# Patient Record
Sex: FEMALE | URBAN - METROPOLITAN AREA
[De-identification: names, ages, dates, MRNs, and addresses within clinical notes are randomized per-mention and may not be internally consistent; named-entity substitution may affect disease eponyms.]

---

## 2017-03-22 NOTE — PATIENT DISCUSSION
Surgery Counseling:  I have discussed the option of glasses versus cataract surgery versus following, It was explained that when vision no longer meets the patient's visual needs and a new prescription for glasses is not likely to improve the patient's visual symptoms, the option of cataract surgery is a reasonable next step. It was explained that there is no guarantee that removing the cataract will improve their visual symptoms; however, it is believed that the cataract is contributing to the patient's visual impairment and surgery may noticeably improve both the visual and functional status of the patient. After this discussion, the patient desires to proceed with cataract surgery with implantation of an intraocular lens to improve their vision for driving at night.

## 2017-03-22 NOTE — PATIENT DISCUSSION
CATARACT, OU - VISUALLY SIGNIFICANT. SCHEDULE PHACO WITH IOL OD FIRST THEN OS IF VISUAL SYMPTOMS PERSIST. GLASSES RX GIVEN TO FILL IF DESIRES IN THE EVENT PATIENT DOES NOT PROCEED WITH SURGERY. SURGERIES ONE MONTH APART SO THAT POST-OP/PRE-OP MAY BE DONE IN [facility] OFFICE.

## 2017-04-21 NOTE — PATIENT DISCUSSION
CATARACT, OU - VISUALLY SIGNIFICANT. SCHEDULE PHACO WITH IOL OS FIRST THEN OD IF VISUAL SYMPTOMS PERSIST.

## 2017-04-21 NOTE — PATIENT DISCUSSION
New Prescription: Besivance (besifloxacin): drops,suspension: 0.6% 1 drop three times a day as directed into left eye 04-

## 2017-04-21 NOTE — PATIENT DISCUSSION
New Prescription: Pred Forte (prednisolone acetate): drops,suspension: 1% 1 drop three times a day as directed into left eye 04-

## 2017-04-21 NOTE — PATIENT DISCUSSION
New Prescription: Prolensa (bromfenac): drops: 0.07% 1 drop every morning as directed into left eye 04-

## 2017-04-21 NOTE — PATIENT DISCUSSION
Continue: Opcon-A (naphazoline-pheniramine): drops: 0.01429-7.315% 1 drop every morning into both eyes

## 2017-04-21 NOTE — PATIENT DISCUSSION
Surgery Drop Counseling:  I have prescribed Besivance, Prolensa and Pred Forte for use as directed before and after cataract surgery.

## 2017-04-21 NOTE — PATIENT DISCUSSION
REFRACTIVE ERROR - PRESBYOPIA AND ASTIGMATISM:  PATIENT DESIRES TO HAVE THEIR REFRACTIVE ERROR SURGICALLY CORRECTED WITH A SYMFONY TORIC EXTENDED RANGE OF VISION IOL.

## 2017-04-26 NOTE — PATIENT DISCUSSION
Continue: Pred Forte (prednisolone acetate): drops,suspension: 1% 1 drop three times a day as directed into left eye 04-

## 2017-04-26 NOTE — PATIENT DISCUSSION
S/P PC IOL, OS: GOOD POST OP RESULT. STOP ANTIBIOTIC DROP IN ONE WEEK. CONTINUE OTHER DROPS AS DIRECTED UNTIL FURTHER INSTRUCTION. RETURN FOR FOLLOW-UP AS SCHEDULED.

## 2017-04-26 NOTE — PATIENT DISCUSSION
Continue: Opcon-A (naphazoline-pheniramine): drops: 0.63074-7.315% 1 drop every morning into both eyes

## 2017-04-26 NOTE — PATIENT DISCUSSION
Continue: Besivance (besifloxacin): drops,suspension: 0.6% 1 drop three times a day as directed into left eye 04-

## 2017-05-08 NOTE — PATIENT DISCUSSION
*Pre-Op 2nd Eye Counseling: The patient has noticed an improvement in their visual symptoms in the operative eye. The patient complains of decreased vision in the fellow eye when driving, watching Tv, and using a computer. It was explained to the patient that the decision to proceed with cataract surgery in the fellow eye is entirely a separate decision from the surgical eye. All of the same risks, benefits and alternatives ere reviewed with the patient again. The patient does feel the vision in the non-operative eye is limiting their daily activities and elects to proceed with surgery in the cataract eye.

## 2017-05-08 NOTE — PATIENT DISCUSSION
Continue: Besivance (besifloxacin): drops,suspension: 0.6% 1 drop three times a day as directed into right eye 04-

## 2017-05-08 NOTE — PATIENT DISCUSSION
*S/P PC IOL, OS : DOING WELL. CONTINUE TO TAPER DROPS AS DIRECTED. OK TO PROCEED WITH THE 2ND EYE CATARACT SURGERY AS SCHEDULED.

## 2017-05-08 NOTE — PATIENT DISCUSSION
REFRACTIVE ERROR - PRESBYOPIA:  PATIENT DESIRES TO HAVE THEIR REFRACTIVE ERROR SURGICALLY CORRECTED WITH A SYMFONY TORIC EXTENDED RANGE OF VISION IOL.

## 2017-05-08 NOTE — PATIENT DISCUSSION
Continue: Opcon-A (naphazoline-pheniramine): drops: 0.09896-0.315% 1 drop every morning into both eyes

## 2017-06-07 NOTE — PATIENT DISCUSSION
PCF OU; BECOMING VISUALLY SIGNIFICANT BUT NOT BOTHERSOME TO PATIENT AT THIS TIME. CONTINUE TO FOLLOW FOR NOW. OFFER SPEC RX UPDATE.

## 2017-06-07 NOTE — PATIENT DISCUSSION
Continue: Opcon-A (naphazoline-pheniramine): drops: 0.55530-1.315% 1 drop every morning into both eyes

## 2017-06-07 NOTE — PATIENT DISCUSSION
Post-Op Instructions OD: Pred Forte (Prednisolone) reduce to 1 time per day for 1 week, then discontinue. Prolensa (Bromfenac) 1 time per day for 1 week, then discontinue.

## 2017-06-07 NOTE — PATIENT DISCUSSION
Continue: Pred Forte (prednisolone acetate): drops,suspension: 1% 1 drop three times a day as directed into both eyes 04-

## 2018-03-01 ENCOUNTER — IMPORTED ENCOUNTER (OUTPATIENT)
Dept: URBAN - METROPOLITAN AREA CLINIC 1 | Facility: CLINIC | Age: 43
End: 2018-03-01

## 2018-03-01 PROBLEM — H16.002: Noted: 2018-03-01

## 2018-03-01 PROCEDURE — 92002 INTRM OPH EXAM NEW PATIENT: CPT

## 2018-03-01 NOTE — PATIENT DISCUSSION
1.  Corneal ulcer OS: Due to CTL overwear. Begin Ofloxacin Q2H OS x 4 days then decrease QID OS until seen (erx). Return immediately if ulcer larger or symptoms worsen. No contact lens use. 2.  Corneal Neovascularization OUReturn for an appointment in 1 week 10 with Dr. Nathaniel King.

## 2018-03-08 ENCOUNTER — IMPORTED ENCOUNTER (OUTPATIENT)
Dept: URBAN - METROPOLITAN AREA CLINIC 1 | Facility: CLINIC | Age: 43
End: 2018-03-08

## 2018-03-08 PROBLEM — H16.002: Noted: 2018-03-08

## 2018-03-08 PROBLEM — H16.012: Noted: 2018-03-08

## 2018-03-08 PROCEDURE — 99213 OFFICE O/P EST LOW 20 MIN: CPT

## 2018-03-08 NOTE — PATIENT DISCUSSION
1.  Corneal ulcer OS: Resolved. REC patient cont DC CLs for a few more day then start by wearing a fresh pair. Educated patient on wearing CLS as directed 2 weeks only. REC patient to not sleep in lens will consider dailies PRN. Cont Ofloxacin until through the weekend BID OS then patient can DC. 2. Return for an appointment in as needed with Dr. Kenisha Senior.

## 2018-08-06 ENCOUNTER — IMPORTED ENCOUNTER (OUTPATIENT)
Dept: URBAN - METROPOLITAN AREA CLINIC 1 | Facility: CLINIC | Age: 43
End: 2018-08-06

## 2018-08-06 PROBLEM — H00.11: Noted: 2018-08-06

## 2018-08-06 PROCEDURE — 92012 INTRM OPH EXAM EST PATIENT: CPT

## 2018-08-06 NOTE — PATIENT DISCUSSION
1.  Nasal RUL Chalazion -- Hot compresses TID x 5 minutes for 3 weeks. If without improvement discussed with patient possible Incision and Drainage procedure. Risks and benefits discussed with patient and patient states full understanding. Recommend the frequent use of OTC AT's BID-QID OU. Recommend the use of OTC Gel AT's Gtts QHS OU (sample given). Recommend d/c the use of CTL at this time for a week or two until Chalazion / symptoms resolve. 2.  Corneal Scar OS -- Subsequently from previous K Ulcer. 3. H/o Corneal Ulcer OS -- Resolved. Return for an appointment as needed or if no improvement / worsening in symptoms with Dr. Nathaniel King.

## 2019-09-03 ENCOUNTER — OFFICE VISIT (OUTPATIENT)
Dept: ORTHOPEDIC SURGERY | Age: 44
End: 2019-09-03

## 2019-09-03 VITALS
HEART RATE: 100 BPM | OXYGEN SATURATION: 99 % | DIASTOLIC BLOOD PRESSURE: 75 MMHG | BODY MASS INDEX: 26.4 KG/M2 | HEIGHT: 70 IN | SYSTOLIC BLOOD PRESSURE: 119 MMHG | WEIGHT: 184.4 LBS | RESPIRATION RATE: 17 BRPM | TEMPERATURE: 96.2 F

## 2019-09-03 DIAGNOSIS — M21.619 BUNION OF GREAT TOE: Primary | ICD-10-CM

## 2019-09-03 DIAGNOSIS — M21.611 BUNION, RIGHT: ICD-10-CM

## 2019-09-03 DIAGNOSIS — Z01.818 PRE-OP EXAM: ICD-10-CM

## 2019-09-03 RX ORDER — LORAZEPAM 2 MG/1
TABLET ORAL
COMMUNITY

## 2019-09-03 RX ORDER — LAMOTRIGINE 100 MG/1
200 TABLET ORAL 2 TIMES DAILY
COMMUNITY

## 2019-09-03 RX ORDER — BUPROPION HYDROCHLORIDE 150 MG/1
TABLET, EXTENDED RELEASE ORAL DAILY
COMMUNITY

## 2019-09-03 NOTE — PROGRESS NOTES
AMBULATORY PROGRESS NOTE      Patient: Mono Russo             MRN: [id number]     SSN: [id number] Body mass index is 26.46 kg/m². YOB: 1975     AGE: 45 y.o. SEX: female    PCP: No primary care provider on file. IMPRESSION/DIAGNOSIS AND TREATMENT PLAN     DIAGNOSES    1. Bunion of great toe    2. Bunion, right    3. Pre-op exam        Orders Placed This Encounter    CULTURE, URINE    [00017] Foot Min 3V    XR CHEST PA LAT    CBC WITH AUTOMATED DIFF    METABOLIC PANEL, COMPREHENSIVE    URINALYSIS W/ RFLX MICROSCOPIC    PROTHROMBIN TIME + INR    HCG URINE, QL    NICOTINE AND METABOLITE, QT SERUM, PLASMA, WHOLE BLOOD    EKG, 12 LEAD, INITIAL      Mono Russo understands her diagnoses and the proposed plan. I had a lengthy discussion with this patient, Ms. Mono Russo, is a 73-year-old female who has been having long-standing pain and discomfort to her right foot, for which she has a diagnosis of a bunion deformity. In examining her, she has some increased mobility at the first TMT joint articulation. Because of her continued pain and discomfort and despite shoe wear changes, the recommendation is for surgery. She does wear 2-inch stiletto heels. There is no guarantee made that she could fit in these stiletto heels at a specific date or time frame. She had her left bunion surgery done many years ago, but at that time, she had a sit-down job and did not have to wear stilettos. She did not have to wear high heeled shoes. 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 I had a lengthy discussion with her regarding smoking. She understands smoking is deleterious to bone healing.   In looking at her x-rays and with her examination, with her having increased mobility to her first metatarsal ray, I am recommending a Lapidus type procedure or Lapiplasty type procedure to gain control of the SKYLER angle and improve the SKYLER angle, and then, of course, do a modified Avina distal soft tissue procedure, possible Ismael procedure. She understands she will have at least two or three incisions on her foot. What dictates how soon she fits in a shoe would be the swelling that she has postoperatively. I anticipate her being nonweightbearing for at least the first two or three weeks to get the incision to heal nicely and then gradual weightbearing to her heel and then we will gradually increase her weightbearing thereafter, but she understands this is going to take at least 10-12 weeks to gain an arthrodesis across this TMT perceived joint arthrodesis. So, we will have her meet and talk to Matt Monique, my surgery scheduler, and we will get things scheduled for her at her convenience. Plan:    1) Pre-Op Labs: CBC w/ diff, CMP, PT INR, CXR, EKG, UA w/ reflex, Urine Culture, pregnancy test, nicotine level. 2) Contact Jillian for surgical scheduling. 3) Continue activity modification as directed. 4) Plan for bone stimulator after surgery. RTO - after contacting [address] [place] [address]     HPI AND EXAMINATION     Clay Dumont IS A 45 y.o. female who presents to my outpatient office complaining of right foot pain. Ms. Tank Ovalle reports that she has a bunion on her right great toe that is causing her pain and discomfort. She notes that her pain has been progressively worsening over the past few years. She states that she is at a point now where even flat shoes cause her discomfort. 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 She notes that she has occasional pain in her left great toe bunion, as well, but not as bad or as frequently as her right foot. She has had surgery on her left great toe bunion in the past. Possible surgical interventions have been discussed with the patient. She states that she would like to have surgery soon, so that she is able to be in a flat shoe by the first week of December.  She notes that she has a pair of stilettos that she is aiming to wear by December. The patient denies any kidney, lung, or heart diseases. She reports that she is a current smoker. I very strongly urged Rey Sorto to quit smoking to reduce cardiovascular risk and to promote bone healing. Rey Sorto understands the cardiovascular and orthopaedic risks of continued cigarette smoking. The patient smokes 3 cigarettes a day. The patient works in event management. Visit Vitals  /75 (BP 1 Location: Left arm, BP Patient Position: Sitting)   Pulse 100   Temp 96.2 °F (35.7 °C) (Oral)   Resp 17   Ht 5' 10\" (1.778 m)   Wt 184 lb 6.4 oz (83.6 kg)   SpO2 99%   BMI 26.46 kg/m²     Appearance: Alert, well appearing and pleasant patient who is in no distress, oriented to person, place/time, and who follows commands. Psychiatric: Affect and mood are appropriate. Cardiovascular/Peripheral Vascular: Normal Pulses to each hand and foot  Musculoskeletal:  LOCATION:  Right FOOT/ANKLE  Integumentary: No rashes, skin patches, wounds, or abrasions to the right or left legs       Warm and normal color. No regions of expressible drainage. Gait:  Slight Limp with gait          Alignment: moderate Bunion Alignment is present,with pronation, with adduction, with toe abutment. no Hammertoe(s) present           Tenderness: mild Medial Eminence of Bunion         Mild tenderness to the plantar great toe MTP   NONE to Medial Malleolar, 4/5 Met base midfoot, achilles, tib post, or   NONE to syndesmosis.        NT with distraction or compression of the 1st MTP     Motor/Strength/Tone Exam: Normal Dorsiflexion/Plantar Flexion of a great toe MTP     Sensory Exam: Intact Normal Sensation to ankle/foot      Stability Testing: SPRING back mobility test for 1st metatarsal is positive, mild   1st TMT instability not tested   No anterolateral or varus instability of the Ankle or Subtalar Joints   No peroneal tendon instability noted ROM: Normal ROM noted to ankle      Contractures: No Achilles or Gastrocnemius Contractures      Calf tenderness: Absent for calf or gastrocnemius muscle regions       Soft, supple, non tender, non taut lower extremity compartments  Alignment: Neutral Hindfoot  Wounds/Abrasions:   None present  Extremities:   No embolic phenomena to the toes or hands         No significant edema to the foot and or toes. Lower extremities are warm and appear well perfused    DVT: No evidence of DVT seen on examination at this time    No calf swelling, no tenderness to calf muscles  Lymphatic:  No Evidence of Lymphedema  Vascular: Medial Border of Tibia Region: Edema is not present        Pulses: Dorsalis Pedis &  Posterior Tibial Pulses : Palpable yes        Varicosities Lower Limbs :  None    Neuro: Negative bilateral Straight leg raise (seated position)    See Musculoskeletal section for pertinent individual extremity examination    No abnormal hand/wrist, foot/ankle, or facial/neck tremors. CHART REVIEW     Past Medical History:   Diagnosis Date    ADHD     Anxiety     Sleep disorder      Current Outpatient Medications   Medication Sig    buPROPion SR (WELLBUTRIN SR) 150 mg SR tablet Take  by mouth two (2) times a day.  Lisdexamfetamine (VYVANSE) 70 mg cap Take  by mouth daily.  lamoTRIgine (LAMICTAL) 100 mg tablet Take  by mouth daily.  lurasidone (LATUDA) 20 mg tab tablet Take  by mouth.  ibuprofen 100 mg tablet Take 100 mg by mouth every six (6) hours as needed for Pain.  LORazepam (ATIVAN) 2 mg tablet Take  by mouth every six (6) hours as needed for Anxiety.  aspirin-acetaminophen-caffeine (EXCEDRIN ES) 250-250-65 mg per tablet Take 1 Tab by mouth. No current facility-administered medications for this visit.       No Known Allergies  Past Surgical History:   Procedure Laterality Date    HX  SECTION      HX CHOLECYSTECTOMY       Social History     Occupational History    Not on file   Tobacco Use    Smoking status: Never Smoker    Smokeless tobacco: Never Used   Substance and Sexual Activity    Alcohol use: Never     Frequency: Never    Drug use: Never    Sexual activity: Not on file     Family History   Problem Relation Age of Onset    Hypertension Mother     Hypertension Father         REVIEW OF SYSTEMS : 9/3/2019  ALL BELOW ARE Negative except : SEE HPI     General: Negative for fever and chills. No unexpected change in weight. Denies fatigue. No change in appetite. Dermatologic: Negative for rash or itching, dry skin, hair changes, rash or skin lesion changes  HEENT: Negative for congestion, sore throat, neck pain and neck stiffness. No change in vision or hearing. Hasn't noted any enlarged lymph nodes in the neck. Cardiovascular:  Negative for chest pain and palpitations. Has not noted pedal edema. Peripheral Vascular: No calf pain, vascular vein tenderness to calf pain           No calf throbbing, posterior knee throbbing pain   Respiratory: Negative for cough, colds, sinus, hemoptysis, shortness of breath and wheezing. Gastrointestinal: Negative for nausea and vomiting, rectal bleeding, coffee ground emesis, abdominal pain, diarrhea and constipation. Genitourinary: Negative for dysuria, frequency urgency, or burning on micturition. No flank pain, no foul smelling urine, no difficulty with initiating urination. Hematological: Negative for bleeding or easy bruising. Musculoskeletal: Negative  for arthralgias, back pain or neck pain. Neurological: Negative for dizziness, seizures or syncopal episodes. Denies headaches. Endocrine: Denies excessive thirst.  No heat/cold intolerance. Psychiatric: Negative for depression or insomnia. DIAGNOSTIC IMAGING     FOOT X RAYS 3 VIEWS Right   9/3/2019    WEIGHT BEARING    X RAYS AT [facility] [address] [place] [address]  9/3/2019      Bones: No fractures or dislocations.  No focal osteolytic or osteoblastic process    Bone Spurs: No significant bone spurs   Alignment: Bunion Alignment: WNL    moderate Hallux Valgus Alignment  (15 degrees),    (8.5 degrees) increased IM ANGLE (1st/2nd)   Metatarsus Adductus is absent   Midfoot Alignment is WNL. Joint: Congruent  Soft Tissues: Soft Tissues No abnormal calcific densities to soft tissues     No ankle joint effusion in lateral projection. Mineralization: Suggests Normal Bone     There is a peaked, central portion of the right great toe first MTP at the metatarsal.  (The patient is completely nontender to distraction tests and grind tests in this region.)        I have personally reviewed the results of the above study. The interpretation of this study is my professional opinion    Jessica Dorman MD  9/3/2019  2:38 PM           .        Please see above section of this report. I have personally reviewed the results of the above study. The interpretation of this study is my professional opinion. Written by Vera Solomon, as dictated by Dr. Patrick Piña. I, Dr. Patrick Piña, confirm that all documentation is accurate.

## 2019-09-03 NOTE — PROGRESS NOTES
1. Have you been to the ER, urgent care clinic since your last visit? Hospitalized since your last visit? No    2. Have you seen or consulted any other health care providers outside of the [address] [place] [address] since your last visit? Include any pap smears or colon screening.  No

## 2019-09-03 NOTE — LETTER
[facility] Surgery Request Form for [facility] Fax to [phone]                                        Telephone: [phone] or [phone] To be completed by Physician Office: 
 
Date: 2019    Requested by: Javier Parsons Phone No: [phone]  Fax No:  [phone] Surgery Date: 19   Requested Time: Second Case Surgeon: Sun Holland MD  Assistant/2nd Surgeon: Katrin Dove PA-C 
 
CPT CODE Procedure 64033, 69312 Right Lapidus, modified Brigette Him 16819 Right Akin procedure ICD10 code(s): Bunion of great toe M21.619 Bunion, right M21.611 Patient Information: 
 
Name: Janet Carpenter SSN: [id number]  : 1975   Male/Female: female Home Phone No: [phone] (home) Primary Insurance: [employer] [place] Number:   [id number] Allergies: No Known Allergies Admission:  Outpatient Anesthesia Type General w/Nerve Block for Post-op Pain Control Comments/Special Equipment and/or : Lapiplasty Trace II, Acumed, Orthofix bone stim  LARGE C-Arm   

## 2019-09-03 NOTE — LETTER
Patient: Johnathan Nova PROCEDURE: Right Lapidus, Modified [place], and Ismael Procedure PRE OPERATIVE INSTRUCTIONS: 
Five (5) days prior to surgery STOP taking any hormonal medications, aspirin and/or anti-inflammatory medications. If you are taking blood thinner medication (such as Coumadin, Plavix, Heparin or others) you will need special instructions from the prescribing physician. LAB: Report to [facility] or [facility] 14 days before your surgery date for bloodwork and EKG. (Your orders for these tests are in [address] [place] [address] at the [facility].) 
 
o It doesn't matter if you have eaten before going to the Lab. o If required labs and EKG are not completed Surgery will be canceled. Surgery Date: 9/13/19  Time: 9:30am 
Report to [facility] on the First Floor Admitting at: 7:30am 
 
THE DAY OF SURGERY:  
      1. Do not eat, chew gum or drink anything after Midnight prior to the date of your surgery. 2. Take your regular medications with small sips of water unless otherwise instructed. (This means blood pressure and/or heart medicine) If you are insulin dependent, bring your insulin with you, unless otherwise instructed. 3. Bring a list of your medications and the dosage to the hospital including  vitamins. 4. Do not wear nail polish, make-up, jewelry, perfumes or Skin Creams. 5. Do not bring valuables or money to the hospital. 
      6. Must have a responsible adult to accompany you and stay during your surgery so they may drive you home following your surgery and stay with you 24 hours after surgery. Post op visit  appointment is scheduled with RICK Naik   
   on 9/18/19 @ 10:15am at the [facility] office. Arthur Edmond, Surgery Scheduler  [phone]

## 2019-09-03 NOTE — PROGRESS NOTES
Verbal order given by Dr. Lila Dominique to sign order for lab work for pre op examination entered by [employer].

## 2019-09-03 NOTE — PATIENT INSTRUCTIONS
Bunions: Care Instructions  Your Care Instructions    A bunion is a bump on the outside of the joint at the bottom of your big toe. It can cause pain and swelling in the toe. A bunion forms when bone or tissue around the joint becomes swollen from too much pressure. You also can have a bunionette, or tailor's bunion, which forms on the joint of the little toe. Sometimes, a bunion on the big toe turns the toe in toward the second toe. This is called displacement. It can lead to problems with the other toes. You can get a bunion from having an unusual walking style, having flatfeet, or wearing tight-fitting shoes. You can treat most bunions at home with a few simple steps. If you have a lot of pain, your doctor may inject medicine into the bunion to reduce swelling for a while. If you still have pain, you may need to have surgery. Follow-up care is a key part of your treatment and safety. Be sure to make and go to all appointments, and call your doctor if you are having problems. It's also a good idea to know your test results and keep a list of the medicines you take. How can you care for yourself at home? · Ask your doctor if you can take an over-the-counter pain medicine, such as acetaminophen (Tylenol), ibuprofen (Advil, Motrin), or naproxen (Aleve). Be safe with medicines. Read and follow all instructions on the label. · Wear shoes that have a wide and deep space for the toes. Also, wear shoes that have low or flat heels and good arch supports. Do not wear tight, narrow, or high-heeled shoes. · Try bunion pads, arch supports, toe spacers, or shoe inserts. They can help shift your weight when you walk to take pressure off your big toe. · Put moleskin or another type of cushion on or around the bunion to keep it from rubbing against your shoe. · Put ice or a cold pack on the area for 10 to 20 minutes at a time as needed. Put a thin cloth between the ice and your skin.   · Prop up your foot on a pillow when you ice your toe or anytime you sit or lie down. Try to keep it above the level of your heart. This will help reduce swelling. When should you call for help? Call your doctor now or seek immediate medical care if:    · You have severe pain.     · Your toe is cool or pale or changes color.     · You have tingling, weakness, or numbness in the toe.    Watch closely for changes in your health, and be sure to contact your doctor if:    · Pain and swelling get worse.     · You do not get better as expected. Where can you learn more? Go to http://scott-heather.info/. Enter H210 in the search box to learn more about \"Bunions: Care Instructions. \"  Current as of: September 20, 2018  Content Version: 12.1  © 2856-3893 [employer]. Care instructions adapted under license by [employer] (which disclaims liability or warranty for this information). If you have questions about a medical condition or this instruction, always ask your healthcare professional. [address] any warranty or liability for your use of this information.

## 2019-09-05 ENCOUNTER — HOSPITAL ENCOUNTER (OUTPATIENT)
Dept: GENERAL RADIOLOGY | Age: 44
Discharge: HOME | End: 2019-09-05
Payer: COMMERCIAL

## 2019-09-05 ENCOUNTER — HOSPITAL ENCOUNTER (OUTPATIENT)
Dept: LAB | Age: 44
Discharge: HOME | End: 2019-09-05
Payer: COMMERCIAL

## 2019-09-05 DIAGNOSIS — Z01.818 PRE-OP EXAM: ICD-10-CM

## 2019-09-05 LAB
ALBUMIN SERPL-MCNC: 4.6 G/DL (ref 3.4–5)
ALBUMIN/GLOB SERPL: 1.5 {RATIO} (ref 0.8–1.7)
ALP SERPL-CCNC: 95 U/L (ref 45–117)
ALT SERPL-CCNC: 34 U/L (ref 13–56)
ANION GAP SERPL CALC-SCNC: 8 MMOL/L (ref 3–18)
APPEARANCE UR: CLEAR
AST SERPL-CCNC: 25 U/L (ref 10–38)
ATRIAL RATE: 85 BPM
BASOPHILS # BLD: 0 K/UL (ref 0–0.1)
BASOPHILS NFR BLD: 1 % (ref 0–2)
BILIRUB SERPL-MCNC: 0.4 MG/DL (ref 0.2–1)
BILIRUB UR QL: NEGATIVE
BUN SERPL-MCNC: 13 MG/DL (ref 7–18)
BUN/CREAT SERPL: 14 (ref 12–20)
CALCIUM SERPL-MCNC: 9.6 MG/DL (ref 8.5–10.1)
CALCULATED P AXIS, ECG09: 61 DEGREES
CALCULATED R AXIS, ECG10: 60 DEGREES
CALCULATED T AXIS, ECG11: 71 DEGREES
CHLORIDE SERPL-SCNC: 106 MMOL/L (ref 100–111)
CO2 SERPL-SCNC: 29 MMOL/L (ref 21–32)
COLOR UR: YELLOW
CREAT SERPL-MCNC: 0.93 MG/DL (ref 0.6–1.3)
DIAGNOSIS, 93000: NORMAL
DIFFERENTIAL METHOD BLD: ABNORMAL
EOSINOPHIL # BLD: 0.2 K/UL (ref 0–0.4)
EOSINOPHIL NFR BLD: 4 % (ref 0–5)
ERYTHROCYTE [DISTWIDTH] IN BLOOD BY AUTOMATED COUNT: 12.2 % (ref 11.6–14.5)
GLOBULIN SER CALC-MCNC: 3 G/DL (ref 2–4)
GLUCOSE SERPL-MCNC: 89 MG/DL (ref 74–99)
GLUCOSE UR STRIP.AUTO-MCNC: NEGATIVE MG/DL
HCG UR QL: NEGATIVE
HCT VFR BLD AUTO: 45.7 % (ref 35–45)
HGB BLD-MCNC: 15.8 G/DL (ref 12–16)
HGB UR QL STRIP: NEGATIVE
INR PPP: 0.9 (ref 0.8–1.2)
KETONES UR QL STRIP.AUTO: NEGATIVE MG/DL
LEUKOCYTE ESTERASE UR QL STRIP.AUTO: NEGATIVE
LYMPHOCYTES # BLD: 0.7 K/UL (ref 0.9–3.6)
LYMPHOCYTES NFR BLD: 14 % (ref 21–52)
MCH RBC QN AUTO: 33.4 PG (ref 24–34)
MCHC RBC AUTO-ENTMCNC: 34.6 G/DL (ref 31–37)
MCV RBC AUTO: 96.6 FL (ref 74–97)
MONOCYTES # BLD: 0.4 K/UL (ref 0.05–1.2)
MONOCYTES NFR BLD: 9 % (ref 3–10)
NEUTS SEG # BLD: 3.7 K/UL (ref 1.8–8)
NEUTS SEG NFR BLD: 72 % (ref 40–73)
NITRITE UR QL STRIP.AUTO: NEGATIVE
P-R INTERVAL, ECG05: 146 MS
PH UR STRIP: 6 [PH] (ref 5–8)
PLATELET # BLD AUTO: 267 K/UL (ref 135–420)
PMV BLD AUTO: 9.4 FL (ref 9.2–11.8)
POTASSIUM SERPL-SCNC: 4.4 MMOL/L (ref 3.5–5.5)
PROT SERPL-MCNC: 7.6 G/DL (ref 6.4–8.2)
PROT UR STRIP-MCNC: NEGATIVE MG/DL
PROTHROMBIN TIME: 12.2 SEC (ref 11.5–15.2)
Q-T INTERVAL, ECG07: 378 MS
QRS DURATION, ECG06: 90 MS
QTC CALCULATION (BEZET), ECG08: 449 MS
RBC # BLD AUTO: 4.73 M/UL (ref 4.2–5.3)
SODIUM SERPL-SCNC: 143 MMOL/L (ref 136–145)
SP GR UR REFRACTOMETRY: 1.02 (ref 1–1.03)
UROBILINOGEN UR QL STRIP.AUTO: 0.2 EU/DL (ref 0.2–1)
VENTRICULAR RATE, ECG03: 85 BPM
WBC # BLD AUTO: 5.1 K/UL (ref 4.6–13.2)

## 2019-09-05 PROCEDURE — 71046 X-RAY EXAM CHEST 2 VIEWS: CPT

## 2019-09-05 PROCEDURE — 87086 URINE CULTURE/COLONY COUNT: CPT

## 2019-09-05 PROCEDURE — 81003 URINALYSIS AUTO W/O SCOPE: CPT

## 2019-09-05 PROCEDURE — 80053 COMPREHEN METABOLIC PANEL: CPT

## 2019-09-05 PROCEDURE — 80323 ALKALOIDS NOS: CPT

## 2019-09-05 PROCEDURE — 85610 PROTHROMBIN TIME: CPT

## 2019-09-05 PROCEDURE — 93005 ELECTROCARDIOGRAM TRACING: CPT

## 2019-09-05 PROCEDURE — 81025 URINE PREGNANCY TEST: CPT

## 2019-09-05 PROCEDURE — 85025 COMPLETE CBC W/AUTO DIFF WBC: CPT

## 2019-09-05 PROCEDURE — 36415 COLL VENOUS BLD VENIPUNCTURE: CPT

## 2019-09-07 LAB
BACTERIA SPEC CULT: NORMAL
COTININE SERPL-MCNC: 26.2 NG/ML
NICOTINE SERPL-MCNC: 3.2 NG/ML
SERVICE CMNT-IMP: NORMAL

## 2019-09-09 ENCOUNTER — OFFICE VISIT (OUTPATIENT)
Dept: ORTHOPEDIC SURGERY | Age: 44
End: 2019-09-09

## 2019-09-09 VITALS
BODY MASS INDEX: 26.74 KG/M2 | HEIGHT: 70 IN | WEIGHT: 186.8 LBS | OXYGEN SATURATION: 98 % | SYSTOLIC BLOOD PRESSURE: 133 MMHG | HEART RATE: 95 BPM | TEMPERATURE: 98.3 F | RESPIRATION RATE: 17 BRPM | DIASTOLIC BLOOD PRESSURE: 92 MMHG

## 2019-09-09 DIAGNOSIS — M19.072 OSTEOARTHRITIS OF LEFT FOOT, UNSPECIFIED OSTEOARTHRITIS TYPE: Primary | ICD-10-CM

## 2019-09-09 DIAGNOSIS — Z01.818 PRE-OPERATIVE EXAMINATION: ICD-10-CM

## 2019-09-09 DIAGNOSIS — M20.42 HAMMER TOE OF LEFT FOOT: ICD-10-CM

## 2019-09-09 RX ORDER — PROMETHAZINE HYDROCHLORIDE 25 MG/1
25 TABLET ORAL
Qty: 30 TAB | Refills: 0 | Status: SHIPPED | OUTPATIENT
Start: 2019-09-09 | End: 2019-11-20 | Stop reason: SDUPTHER

## 2019-09-09 RX ORDER — HYDROCODONE BITARTRATE AND ACETAMINOPHEN 7.5; 325 MG/1; MG/1
1-2 TABLET ORAL
Qty: 42 TAB | Refills: 0 | Status: SHIPPED | OUTPATIENT
Start: 2019-09-09 | End: 2019-09-16

## 2019-09-09 RX ORDER — POLYETHYLENE GLYCOL 3350 17 G/17G
17 POWDER, FOR SOLUTION ORAL DAILY
Qty: 10 PACKET | Refills: 1 | Status: SHIPPED | OUTPATIENT
Start: 2019-09-09

## 2019-09-09 NOTE — PROGRESS NOTES
FOOT AND ANKLE HISTORY AND PHYSICAL      Patient: Cindy Marcos                   MRN: [id number]         SSN: [id number]  YOB: 1975                AGE: 45 y.o. SEX: female    Patient scheduled for:  Right Lapidus procedure, modified Avina distal soft tissue procedure, possible Ismael procedure  Date of surgery: 9/12/19   Special Equipment: [address]  Location of Surgery: [facility]   Surgeon: Royce Zambrano MD  ANESTHESIA TYPE:  General, Popliteal block          PRESCRIPTIONS AND/OR ORDERS PROVIDED DURING H&P:    Orders Placed This Encounter    AMB SUPPLY ORDER    Generic Supply Order    HYDROcodone-acetaminophen (NORCO) 7.5-325 mg per tablet    promethazine (PHENERGAN) 25 mg tablet    polyethylene glycol (MIRALAX) 17 gram packet    rivaroxaban (XARELTO) 10 mg tablet                          HISTORY:     The patient was seen in the office today for a preoperative history and physical for an upcoming above listed surgery. The patient is a pleasant 45 y.o. female who has a history of bunion on her right great toe that is causing her pain and discomfort. She notes that her pain has been progressively worsening over the past few years. She states that she is at a point now where even flat shoes cause her discomfort. 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 She notes that she has occasional pain in her left great toe bunion, as well, but not as bad or as frequently as her right foot. She has had surgery on her left great toe bunion in the past. Possible surgical interventions have been discussed with the patient. She states that she would like to have surgery soon, so that she is able to be in a flat shoe by the first week of December. She notes that she has a pair of stilettos that she is aiming to wear by December. The patient denies any kidney, lung, or heart diseases.  She reports that she is a current smoker. Dr. Kemi Swenson had a lengthy discussion with this patient regarding her smoking. She understands smoking is deleterious to bone healing. Because of her continued pain and discomfort and despite shoe wear changes, the recommendation is for surgery. She does wear 2-inch stiletto heels. There is no guarantee made that she could fit in these stiletto heels at a specific date or time frame. She had her left bunion surgery done many years ago, but at that time, she had a sit-down job and did not have to wear stilettos. She did not have to wear high heeled shoes. 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 Due to the current findings, affected activity of daily living and continued pain and discomfort, surgical intervention is indicated. The alternatives, risks, and complications, including but not limited to infection, blood loss, need for blood transfusion, neurovascular damage, ewelina-incisional numbness, subcutaneous hematoma, bone fracture, anesthetic complications, DVT, PE, death, RSD, postoperative stiffness and pain, possible surgical scar, delayed healing and nonhealing, reflexive sympathetic dystrophy, damage to blood vessels and nerves, need for more surgery, MI, and stroke have been discussed. The patient understands and wishes to proceed with surgery. She understands she will have at least two or three incisions on her foot. What dictates how soon she fits in a shoe would be the swelling that she has postoperatively. We anticipate her being nonweightbearing for at least the first two or three weeks to get the incision to heal nicely and then gradual weightbearing to her heel and then we will gradually increase her weightbearing thereafter, but she understands this is going to take at least 10-12 weeks to gain an arthrodesis across this TMT perceived joint arthrodesis.      PAST MEDICAL HISTORY:     Past Medical History:   Diagnosis Date    ADHD     Anxiety     Body piercing     Navel    IBS (irritable bowel syndrome)     IUD (intrauterine device) in place     Sleep disorder        CURRENT MEDICATIONS:     Current Outpatient Medications   Medication Sig Dispense Refill    HYDROcodone-acetaminophen (NORCO) 7.5-325 mg per tablet Take 1-2 Tabs by mouth every six (6) hours as needed for Pain for up to 7 days. Max Daily Amount: 8 Tabs. FOR PAIN **AFTER SURGERY**DO NOT TAKE BEFORE SURGERY  Indications: pain 42 Tab 0    promethazine (PHENERGAN) 25 mg tablet Take 1 Tab by mouth every six (6) hours as needed for Nausea. 30 Tab 0    polyethylene glycol (MIRALAX) 17 gram packet Take 1 Packet by mouth daily. 10 Packet 1    rivaroxaban (XARELTO) 10 mg tablet TAKE ONE TABLET BY MOUTH PER DAY FOR 14 DAYS FOLLOWING SURGERY  Indications: Treatment to Prevent Recurrence of a Clot in a Deep Vein 14 Tab 0    buPROPion SR (WELLBUTRIN SR) 150 mg SR tablet Take  by mouth two (2) times a day.  Lisdexamfetamine (VYVANSE) 70 mg cap Take  by mouth daily.  lamoTRIgine (LAMICTAL) 100 mg tablet Take  by mouth daily.  lurasidone (LATUDA) 20 mg tab tablet Take  by mouth.  LORazepam (ATIVAN) 2 mg tablet Take  by mouth every six (6) hours as needed for Anxiety.          ALLERGIES:     No Known Allergies      SURGICAL HISTORY:     Past Surgical History:   Procedure Laterality Date    HX  SECTION      HX CHOLECYSTECTOMY      HX CYST REMOVAL         SOCIAL HISTORY:     Social History     Socioeconomic History    Marital status: UNKNOWN     Spouse name: Not on file    Number of children: Not on file    Years of education: Not on file    Highest education level: Not on file   Tobacco Use    Smoking status: Never Smoker    Smokeless tobacco: Never Used    Tobacco comment: social   Substance and Sexual Activity    Alcohol use: Never     Frequency: Never     Comment: social    Drug use: Never       FAMILY HISTORY:     Family History   Problem Relation Age of Onset    Hypertension Mother     Hypertension Father        REVIEW OF SYSTEMS:     Negative for fevers, chills, chest pain, shortness of breath, weight loss, recent illness     General: Negative for fever and chills. No unexpected change in weight. Denies fatigue. No change in appetite. Skin: Negative for rash or itching. HEENT: Negative for congestion, sore throat, neck pain and neck stiffness. No change in vision or hearing. Hasn't noted any enlarged lymph nodes in the neck. Cardiovascular:  Negative for chest pain and palpitations. Has not noted pedal edema. Respiratory: Negative for cough, colds, sinus, hemoptysis, shortness of breath and wheezing. Gastrointestinal: Negative for nausea and vomiting, rectal bleeding, coffee ground emesis, abdominal pain, diarrhea and constipation. Genitourinary: Negative for dysuria, frequency urgency, or burning on micturition. No flank pain, no foul smelling urine, no difficulty with initiating urination. Hematological: Negative for bleeding or easy bruising. Musculoskeletal: Negative  for arthralgias, back pain or neck pain. Neurological: Negative for dizziness, seizures or syncopal episodes. Denies headaches. Endocrine: Denies excessive thirst.  No heat/cold intolerance. Psychiatric: Negative for depression or insomnia. PHYSICAL EXAMINATION:     VITALS:   Visit Vitals  BP (!) 133/92   Pulse 95   Temp 98.3 °F (36.8 °C) (Oral)   Resp 17   Ht 5' 10\" (1.778 m)   Wt 186 lb 12.8 oz (84.7 kg)   SpO2 98%   BMI 26.80 kg/m²       Pain Assessment  9/9/2019   Location of Pain Foot   Pain Location Comment -   Location Modifiers Right   Severity of Pain 8   Quality of Pain Aching   Duration of Pain Persistent   Frequency of Pain Constant   Aggravating Factors Standing;Walking   Aggravating Factors Comment -   Relieving Factors Rest   Result of Injury No        Pain Location: Foot      GEN:  Well developed, well nourished 45 y.o. female in no acute distress. PSYCH: Alert an oriented to person, place and time. Mood, memory, affect, behavior and judgment normal  HEENT: Normocephalic and atraumatic. Eyes: Conjunctivae and EOM are normal.Pupils are equal, round, and reactive to light. External ear normal appearance, external nose normal appearing. Mouth/Throat: Oropharynx is clear and moist, able to handle oral secretions w/out difficulty, airway patent. NECK: Supple. Normal ROM, No lymphadenopathy. Trachea is midline. No bruising, swelling or deformity  RESP: Clear to auscultation bilaterally. No wheezes, rales, rhonchi. Normal effort and breath sounds. No respiratory distress  CARDIO: Normal rate, regular rhythm and normal heart sounds. No MGR. ABDOMEN: Soft, non-tender, non-distended, normoactive bowel sounds in all four quadrants. There is no tenderness. There is no rebound and no guarding. BACK: No CVA or spinal tenderness  BREAST:  Deferred  PELVIC:    Deferred   RECTAL:  Deferred   :           Deferred  EXTREMITIES: EXAMINATION OF:   Musculoskeletal:  LOCATION:  Right FOOT/ANKLE  Integumentary: No rashes, skin patches, wounds, or abrasions to the right or left legs                             Warm and normal color. No regions of expressible drainage. Gait:  Slight Limp with gait          Alignment: moderate Bunion Alignment is present,with pronation, with adduction, with toe abutment. no Hammertoe(s) present           Tenderness: mild Medial Eminence of Bunion                               Mild tenderness to the plantar great toe MTP              NONE to Medial Malleolar, 4/5 Met base midfoot, achilles, tib post, or              NONE to syndesmosis.                   NT with distraction or compression of the 1st MTP     Motor/Strength/Tone Exam: Normal Dorsiflexion/Plantar Flexion of a great toe MTP     Sensory Exam: Intact Normal Sensation to ankle/foot      Stability Testing: SPRING back mobility test for 1st metatarsal is positive, mild              1st TMT instability not tested              No anterolateral or varus instability of the Ankle or Subtalar Joints              No peroneal tendon instability noted      ROM: Normal ROM noted to ankle      Contractures: No Achilles or Gastrocnemius Contractures      Calf tenderness: Absent for calf or gastrocnemius muscle regions       Soft, supple, non tender, non taut lower extremity compartments  Alignment: Neutral Hindfoot  Wounds/Abrasions:   None present  Extremities:   No embolic phenomena to the toes or hands                          No significant edema to the foot and or toes.                           Lower extremities are warm and appear well perfused                          DVT: No evidence of DVT seen on examination at this time                          No calf swelling, no tenderness to calf muscles  Lymphatic:  No Evidence of Lymphedema  Vascular: Medial Border of Tibia Region: Edema is not present                   Pulses: Dorsalis Pedis &  Posterior Tibial Pulses : Palpable yes                   Varicosities Lower Limbs :  None    Neuro: Negative bilateral Straight leg raise (seated position)               See Musculoskeletal section for pertinent individual extremity examination               No abnormal hand/wrist, foot/ankle, or facial/neck tremors.       RADIOGRAPHS & DIAGNOSTIC STUDIES:     No notes on file    LABS:     @  CBC:   Lab Results   Component Value Date/Time    WBC 5.1 09/05/2019 01:38 PM    RBC 4.73 09/05/2019 01:38 PM    HGB 15.8 09/05/2019 01:38 PM    HCT 45.7 (H) 09/05/2019 01:38 PM    PLATELET 749 63/94/9739 01:38 PM   , CMP:   Lab Results   Component Value Date/Time    Glucose 89 09/05/2019 01:38 PM    Sodium 143 09/05/2019 01:38 PM    Potassium 4.4 09/05/2019 01:38 PM    Chloride 106 09/05/2019 01:38 PM    CO2 29 09/05/2019 01:38 PM    BUN 13 09/05/2019 01:38 PM    Creatinine 0.93 09/05/2019 01:38 PM    Calcium 9.6 09/05/2019 01:38 PM    Anion gap 8 09/05/2019 01:38 PM    BUN/Creatinine ratio 14 09/05/2019 01:38 PM    Alk. phosphatase 95 09/05/2019 01:38 PM    Protein, total 7.6 09/05/2019 01:38 PM    Albumin 4.6 09/05/2019 01:38 PM    Globulin 3.0 09/05/2019 01:38 PM    A-G Ratio 1.5 09/05/2019 01:38 PM    and Coagulation:   Lab Results   Component Value Date/Time    Prothrombin time 12.2 09/05/2019 01:38 PM    INR 0.9 09/05/2019 01:38 PM       Preoperative labs were reviewed and are substantially within normal limits   Chest X-ray revealed no acute cardiopulmonary process   EKG: normal EKG, normal sinus rhythm. ASSESSMENT:       Encounter Diagnoses   Name Primary?  Osteoarthritis of left foot, unspecified osteoarthritis type Yes    Hammer toe of left foot     Pre-operative examination        PLAN:     Again, the alternatives, risks, and complications, as well as expected outcome were discussed. The patient understands and agrees to proceed with the above listed surgery. Patient has been given Hibiclens wash with instructions and/or prescriptions or orders listed above.     Loc Turner PA-C  9/9/2019  2:36 PM

## 2019-09-09 NOTE — H&P
FOOT AND ANKLE HISTORY AND PHYSICAL      Patient: Helena Copeland                   MRN: [id number]         SSN: [id number]  YOB: 1975                AGE: 45 y.o. SEX: female    Patient scheduled for:  Right Lapidus procedure, modified Avina distal soft tissue procedure, possible Ismael procedure  Date of surgery: 9/12/19   Special Equipment: [address]  [facility]   Surgeon: Loli Martinez. MD Juan Maneul  ANESTHESIA TYPE:  General, Popliteal block          PRESCRIPTIONS AND/OR ORDERS PROVIDED DURING H&P:    Orders Placed This Encounter    AMB SUPPLY ORDER    Generic Supply Order    HYDROcodone-acetaminophen (NORCO) 7.5-325 mg per tablet    promethazine (PHENERGAN) 25 mg tablet    polyethylene glycol (MIRALAX) 17 gram packet    rivaroxaban (XARELTO) 10 mg tablet                          HISTORY:     The patient was seen in the office today for a preoperative history and physical for an upcoming above listed surgery. The patient is a pleasant 45 y.o. female who has a history of bunion on her right great toe that is causing her pain and discomfort. She notes that her pain has been progressively worsening over the past few years. She states that she is at a point now where even flat shoes cause her discomfort. 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 She notes that she has occasional pain in her left great toe bunion, as well, but not as bad or as frequently as her right foot. She has had surgery on her left great toe bunion in the past. Possible surgical interventions have been discussed with the patient. She states that she would like to have surgery soon, so that she is able to be in a flat shoe by the first week of December. She notes that she has a pair of stilettos that she is aiming to wear by December. The patient denies any kidney, lung, or heart diseases.  She reports that she is a current smoker. Dr. Gaurang Orozco had a lengthy discussion with this patient regarding her smoking. She understands smoking is deleterious to bone healing. Because of her continued pain and discomfort and despite shoe wear changes, the recommendation is for surgery. She does wear 2-inch stiletto heels. There is no guarantee made that she could fit in these stiletto heels at a specific date or time frame. She had her left bunion surgery done many years ago, but at that time, she had a sit-down job and did not have to wear stilettos. She did not have to wear high heeled shoes. 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 Due to the current findings, affected activity of daily living and continued pain and discomfort, surgical intervention is indicated. The alternatives, risks, and complications, including but not limited to infection, blood loss, need for blood transfusion, neurovascular damage, ewelina-incisional numbness, subcutaneous hematoma, bone fracture, anesthetic complications, DVT, PE, death, RSD, postoperative stiffness and pain, possible surgical scar, delayed healing and nonhealing, reflexive sympathetic dystrophy, damage to blood vessels and nerves, need for more surgery, MI, and stroke have been discussed. The patient understands and wishes to proceed with surgery. She understands she will have at least two or three incisions on her foot. What dictates how soon she fits in a shoe would be the swelling that she has postoperatively. We anticipate her being nonweightbearing for at least the first two or three weeks to get the incision to heal nicely and then gradual weightbearing to her heel and then we will gradually increase her weightbearing thereafter, but she understands this is going to take at least 10-12 weeks to gain an arthrodesis across this TMT perceived joint arthrodesis.      PAST MEDICAL HISTORY:     Past Medical History:   Diagnosis Date    ADHD     Anxiety     Body piercing     Navel    IBS (irritable bowel syndrome)     IUD (intrauterine device) in place     Sleep disorder        CURRENT MEDICATIONS:     Current Outpatient Medications   Medication Sig Dispense Refill    HYDROcodone-acetaminophen (NORCO) 7.5-325 mg per tablet Take 1-2 Tabs by mouth every six (6) hours as needed for Pain for up to 7 days. Max Daily Amount: 8 Tabs. FOR PAIN **AFTER SURGERY**DO NOT TAKE BEFORE SURGERY  Indications: pain 42 Tab 0    promethazine (PHENERGAN) 25 mg tablet Take 1 Tab by mouth every six (6) hours as needed for Nausea. 30 Tab 0    polyethylene glycol (MIRALAX) 17 gram packet Take 1 Packet by mouth daily. 10 Packet 1    rivaroxaban (XARELTO) 10 mg tablet TAKE ONE TABLET BY MOUTH PER DAY FOR 14 DAYS FOLLOWING SURGERY  Indications: Treatment to Prevent Recurrence of a Clot in a Deep Vein 14 Tab 0    buPROPion SR (WELLBUTRIN SR) 150 mg SR tablet Take  by mouth two (2) times a day.  Lisdexamfetamine (VYVANSE) 70 mg cap Take  by mouth daily.  lamoTRIgine (LAMICTAL) 100 mg tablet Take  by mouth daily.  lurasidone (LATUDA) 20 mg tab tablet Take  by mouth.  LORazepam (ATIVAN) 2 mg tablet Take  by mouth every six (6) hours as needed for Anxiety.          ALLERGIES:     No Known Allergies      SURGICAL HISTORY:     Past Surgical History:   Procedure Laterality Date    HX  SECTION      HX CHOLECYSTECTOMY      HX CYST REMOVAL         SOCIAL HISTORY:     Social History     Socioeconomic History    Marital status: UNKNOWN     Spouse name: Not on file    Number of children: Not on file    Years of education: Not on file    Highest education level: Not on file   Tobacco Use    Smoking status: Never Smoker    Smokeless tobacco: Never Used    Tobacco comment: social   Substance and Sexual Activity    Alcohol use: Never     Frequency: Never     Comment: social    Drug use: Never       FAMILY HISTORY:     Family History   Problem Relation Age of Onset    Hypertension Mother     Hypertension Father        REVIEW OF SYSTEMS:     Negative for fevers, chills, chest pain, shortness of breath, weight loss, recent illness     General: Negative for fever and chills. No unexpected change in weight. Denies fatigue. No change in appetite. Skin: Negative for rash or itching. HEENT: Negative for congestion, sore throat, neck pain and neck stiffness. No change in vision or hearing. Hasn't noted any enlarged lymph nodes in the neck. Cardiovascular:  Negative for chest pain and palpitations. Has not noted pedal edema. Respiratory: Negative for cough, colds, sinus, hemoptysis, shortness of breath and wheezing. Gastrointestinal: Negative for nausea and vomiting, rectal bleeding, coffee ground emesis, abdominal pain, diarrhea and constipation. Genitourinary: Negative for dysuria, frequency urgency, or burning on micturition. No flank pain, no foul smelling urine, no difficulty with initiating urination. Hematological: Negative for bleeding or easy bruising. Musculoskeletal: Negative  for arthralgias, back pain or neck pain. Neurological: Negative for dizziness, seizures or syncopal episodes. Denies headaches. Endocrine: Denies excessive thirst.  No heat/cold intolerance. Psychiatric: Negative for depression or insomnia. PHYSICAL EXAMINATION:     VITALS:   Visit Vitals  BP (!) 133/92   Pulse 95   Temp 98.3 °F (36.8 °C) (Oral)   Resp 17   Ht 5' 10\" (1.778 m)   Wt 186 lb 12.8 oz (84.7 kg)   SpO2 98%   BMI 26.80 kg/m²       Pain Assessment  9/9/2019   Location of Pain Foot   Pain Location Comment -   Location Modifiers Right   Severity of Pain 8   Quality of Pain Aching   Duration of Pain Persistent   Frequency of Pain Constant   Aggravating Factors Standing;Walking   Aggravating Factors Comment -   Relieving Factors Rest   Result of Injury No        Pain Location: Foot      GEN:  Well developed, well nourished 45 y.o. female in no acute distress. PSYCH: Alert an oriented to person, place and time. Mood, memory, affect, behavior and judgment normal  HEENT: Normocephalic and atraumatic. Eyes: Conjunctivae and EOM are normal.Pupils are equal, round, and reactive to light. External ear normal appearance, external nose normal appearing. Mouth/Throat: Oropharynx is clear and moist, able to handle oral secretions w/out difficulty, airway patent. NECK: Supple. Normal ROM, No lymphadenopathy. Trachea is midline. No bruising, swelling or deformity  RESP: Clear to auscultation bilaterally. No wheezes, rales, rhonchi. Normal effort and breath sounds. No respiratory distress  CARDIO: Normal rate, regular rhythm and normal heart sounds. No MGR. ABDOMEN: Soft, non-tender, non-distended, normoactive bowel sounds in all four quadrants. There is no tenderness. There is no rebound and no guarding. BACK: No CVA or spinal tenderness  BREAST:  Deferred  PELVIC:    Deferred   RECTAL:  Deferred   :           Deferred  EXTREMITIES: EXAMINATION OF:   Musculoskeletal:  LOCATION:  Right FOOT/ANKLE  Integumentary: No rashes, skin patches, wounds, or abrasions to the right or left legs                             Warm and normal color. No regions of expressible drainage. Gait:  Slight Limp with gait          Alignment: moderate Bunion Alignment is present,with pronation, with adduction, with toe abutment. no Hammertoe(s) present           Tenderness: mild Medial Eminence of Bunion                               Mild tenderness to the plantar great toe MTP              NONE to Medial Malleolar, 4/5 Met base midfoot, achilles, tib post, or              NONE to syndesmosis.                   NT with distraction or compression of the 1st MTP     Motor/Strength/Tone Exam: Normal Dorsiflexion/Plantar Flexion of a great toe MTP     Sensory Exam: Intact Normal Sensation to ankle/foot      Stability Testing: SPRING back mobility test for 1st metatarsal is positive, mild              1st TMT instability not tested              No anterolateral or varus instability of the Ankle or Subtalar Joints              No peroneal tendon instability noted      ROM: Normal ROM noted to ankle      Contractures: No Achilles or Gastrocnemius Contractures      Calf tenderness: Absent for calf or gastrocnemius muscle regions       Soft, supple, non tender, non taut lower extremity compartments  Alignment: Neutral Hindfoot  Wounds/Abrasions:   None present  Extremities:   No embolic phenomena to the toes or hands                          No significant edema to the foot and or toes.                           Lower extremities are warm and appear well perfused                          DVT: No evidence of DVT seen on examination at this time                          No calf swelling, no tenderness to calf muscles  Lymphatic:  No Evidence of Lymphedema  Vascular: Medial Border of Tibia Region: Edema is not present                   Pulses: Dorsalis Pedis &  Posterior Tibial Pulses : Palpable yes                   Varicosities Lower Limbs :  None    Neuro: Negative bilateral Straight leg raise (seated position)               See Musculoskeletal section for pertinent individual extremity examination               No abnormal hand/wrist, foot/ankle, or facial/neck tremors.       RADIOGRAPHS & DIAGNOSTIC STUDIES:     No notes on file    LABS:     @  CBC:   Lab Results   Component Value Date/Time    WBC 5.1 09/05/2019 01:38 PM    RBC 4.73 09/05/2019 01:38 PM    HGB 15.8 09/05/2019 01:38 PM    HCT 45.7 (H) 09/05/2019 01:38 PM    PLATELET 717 84/73/2294 01:38 PM   , CMP:   Lab Results   Component Value Date/Time    Glucose 89 09/05/2019 01:38 PM    Sodium 143 09/05/2019 01:38 PM    Potassium 4.4 09/05/2019 01:38 PM    Chloride 106 09/05/2019 01:38 PM    CO2 29 09/05/2019 01:38 PM    BUN 13 09/05/2019 01:38 PM    Creatinine 0.93 09/05/2019 01:38 PM    Calcium 9.6 09/05/2019 01:38 PM    Anion gap 8 09/05/2019 01:38 PM    BUN/Creatinine ratio 14 09/05/2019 01:38 PM    Alk. phosphatase 95 09/05/2019 01:38 PM    Protein, total 7.6 09/05/2019 01:38 PM    Albumin 4.6 09/05/2019 01:38 PM    Globulin 3.0 09/05/2019 01:38 PM    A-G Ratio 1.5 09/05/2019 01:38 PM    and Coagulation:   Lab Results   Component Value Date/Time    Prothrombin time 12.2 09/05/2019 01:38 PM    INR 0.9 09/05/2019 01:38 PM       Preoperative labs were reviewed and are substantially within normal limits   Chest X-ray revealed no acute cardiopulmonary process   EKG: normal EKG, normal sinus rhythm. ASSESSMENT:       Encounter Diagnoses   Name Primary?  Osteoarthritis of left foot, unspecified osteoarthritis type Yes    Hammer toe of left foot     Pre-operative examination        PLAN:     Again, the alternatives, risks, and complications, as well as expected outcome were discussed. The patient understands and agrees to proceed with the above listed surgery. Patient has been given Hibiclens wash with instructions and/or prescriptions or orders listed above.     Arnulfo Meehan PA-C  9/9/2019  2:36 PM

## 2019-09-09 NOTE — PROGRESS NOTES
1. Have you been to the ER, urgent care clinic since your last visit? Hospitalized since your last visit? No    2. Have you seen or consulted any other health care providers outside of the [address] [place] [address] since your last visit? Include any pap smears or colon screening.  No

## 2019-09-09 NOTE — H&P (VIEW-ONLY)
FOOT AND ANKLE HISTORY AND PHYSICAL      Patient: Cindy Marcos                   MRN: [id number]         SSN: [id number]  YOB: 1975                AGE: 45 y.o. SEX: female    Patient scheduled for:  Right Lapidus procedure, modified Avina distal soft tissue procedure, possible Ismael procedure  Date of surgery: 9/12/19   Special Equipment: [address]  [facility]   Surgeon: Royce Zambrano MD  ANESTHESIA TYPE:  General, Popliteal block          PRESCRIPTIONS AND/OR ORDERS PROVIDED DURING H&P:    Orders Placed This Encounter    AMB SUPPLY ORDER    Generic Supply Order    HYDROcodone-acetaminophen (NORCO) 7.5-325 mg per tablet    promethazine (PHENERGAN) 25 mg tablet    polyethylene glycol (MIRALAX) 17 gram packet    rivaroxaban (XARELTO) 10 mg tablet                          HISTORY:     The patient was seen in the office today for a preoperative history and physical for an upcoming above listed surgery. The patient is a pleasant 45 y.o. female who has a history of bunion on her right great toe that is causing her pain and discomfort. She notes that her pain has been progressively worsening over the past few years. She states that she is at a point now where even flat shoes cause her discomfort. 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 She notes that she has occasional pain in her left great toe bunion, as well, but not as bad or as frequently as her right foot. She has had surgery on her left great toe bunion in the past. Possible surgical interventions have been discussed with the patient. She states that she would like to have surgery soon, so that she is able to be in a flat shoe by the first week of December. She notes that she has a pair of stilettos that she is aiming to wear by December. The patient denies any kidney, lung, or heart diseases.  She reports that she is a current smoker. Dr. Tramaine Hastings had a lengthy discussion with this patient regarding her smoking. She understands smoking is deleterious to bone healing. Because of her continued pain and discomfort and despite shoe wear changes, the recommendation is for surgery. She does wear 2-inch stiletto heels. There is no guarantee made that she could fit in these stiletto heels at a specific date or time frame. She had her left bunion surgery done many years ago, but at that time, she had a sit-down job and did not have to wear stilettos. She did not have to wear high heeled shoes. 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 Due to the current findings, affected activity of daily living and continued pain and discomfort, surgical intervention is indicated. The alternatives, risks, and complications, including but not limited to infection, blood loss, need for blood transfusion, neurovascular damage, ewelina-incisional numbness, subcutaneous hematoma, bone fracture, anesthetic complications, DVT, PE, death, RSD, postoperative stiffness and pain, possible surgical scar, delayed healing and nonhealing, reflexive sympathetic dystrophy, damage to blood vessels and nerves, need for more surgery, MI, and stroke have been discussed. The patient understands and wishes to proceed with surgery. She understands she will have at least two or three incisions on her foot. What dictates how soon she fits in a shoe would be the swelling that she has postoperatively. We anticipate her being nonweightbearing for at least the first two or three weeks to get the incision to heal nicely and then gradual weightbearing to her heel and then we will gradually increase her weightbearing thereafter, but she understands this is going to take at least 10-12 weeks to gain an arthrodesis across this TMT perceived joint arthrodesis.      PAST MEDICAL HISTORY:     Past Medical History:   Diagnosis Date    ADHD     Anxiety     Body piercing     Navel    IBS (irritable bowel syndrome)     IUD (intrauterine device) in place     Sleep disorder        CURRENT MEDICATIONS:     Current Outpatient Medications   Medication Sig Dispense Refill    HYDROcodone-acetaminophen (NORCO) 7.5-325 mg per tablet Take 1-2 Tabs by mouth every six (6) hours as needed for Pain for up to 7 days. Max Daily Amount: 8 Tabs. FOR PAIN **AFTER SURGERY**DO NOT TAKE BEFORE SURGERY  Indications: pain 42 Tab 0    promethazine (PHENERGAN) 25 mg tablet Take 1 Tab by mouth every six (6) hours as needed for Nausea. 30 Tab 0    polyethylene glycol (MIRALAX) 17 gram packet Take 1 Packet by mouth daily. 10 Packet 1    rivaroxaban (XARELTO) 10 mg tablet TAKE ONE TABLET BY MOUTH PER DAY FOR 14 DAYS FOLLOWING SURGERY  Indications: Treatment to Prevent Recurrence of a Clot in a Deep Vein 14 Tab 0    buPROPion SR (WELLBUTRIN SR) 150 mg SR tablet Take  by mouth two (2) times a day.  Lisdexamfetamine (VYVANSE) 70 mg cap Take  by mouth daily.  lamoTRIgine (LAMICTAL) 100 mg tablet Take  by mouth daily.  lurasidone (LATUDA) 20 mg tab tablet Take  by mouth.  LORazepam (ATIVAN) 2 mg tablet Take  by mouth every six (6) hours as needed for Anxiety.          ALLERGIES:     No Known Allergies      SURGICAL HISTORY:     Past Surgical History:   Procedure Laterality Date    HX  SECTION      HX CHOLECYSTECTOMY      HX CYST REMOVAL         SOCIAL HISTORY:     Social History     Socioeconomic History    Marital status: UNKNOWN     Spouse name: Not on file    Number of children: Not on file    Years of education: Not on file    Highest education level: Not on file   Tobacco Use    Smoking status: Never Smoker    Smokeless tobacco: Never Used    Tobacco comment: social   Substance and Sexual Activity    Alcohol use: Never     Frequency: Never     Comment: social    Drug use: Never       FAMILY HISTORY:     Family History   Problem Relation Age of Onset    Hypertension Mother     Hypertension Father        REVIEW OF SYSTEMS:     Negative for fevers, chills, chest pain, shortness of breath, weight loss, recent illness     General: Negative for fever and chills. No unexpected change in weight. Denies fatigue. No change in appetite. Skin: Negative for rash or itching. HEENT: Negative for congestion, sore throat, neck pain and neck stiffness. No change in vision or hearing. Hasn't noted any enlarged lymph nodes in the neck. Cardiovascular:  Negative for chest pain and palpitations. Has not noted pedal edema. Respiratory: Negative for cough, colds, sinus, hemoptysis, shortness of breath and wheezing. Gastrointestinal: Negative for nausea and vomiting, rectal bleeding, coffee ground emesis, abdominal pain, diarrhea and constipation. Genitourinary: Negative for dysuria, frequency urgency, or burning on micturition. No flank pain, no foul smelling urine, no difficulty with initiating urination. Hematological: Negative for bleeding or easy bruising. Musculoskeletal: Negative  for arthralgias, back pain or neck pain. Neurological: Negative for dizziness, seizures or syncopal episodes. Denies headaches. Endocrine: Denies excessive thirst.  No heat/cold intolerance. Psychiatric: Negative for depression or insomnia. PHYSICAL EXAMINATION:     VITALS:   Visit Vitals  BP (!) 133/92   Pulse 95   Temp 98.3 °F (36.8 °C) (Oral)   Resp 17   Ht 5' 10\" (1.778 m)   Wt 186 lb 12.8 oz (84.7 kg)   SpO2 98%   BMI 26.80 kg/m²       Pain Assessment  9/9/2019   Location of Pain Foot   Pain Location Comment -   Location Modifiers Right   Severity of Pain 8   Quality of Pain Aching   Duration of Pain Persistent   Frequency of Pain Constant   Aggravating Factors Standing;Walking   Aggravating Factors Comment -   Relieving Factors Rest   Result of Injury No        Pain Location: Foot      GEN:  Well developed, well nourished 45 y.o. female in no acute distress. PSYCH: Alert an oriented to person, place and time. Mood, memory, affect, behavior and judgment normal  HEENT: Normocephalic and atraumatic. Eyes: Conjunctivae and EOM are normal.Pupils are equal, round, and reactive to light. External ear normal appearance, external nose normal appearing. Mouth/Throat: Oropharynx is clear and moist, able to handle oral secretions w/out difficulty, airway patent. NECK: Supple. Normal ROM, No lymphadenopathy. Trachea is midline. No bruising, swelling or deformity  RESP: Clear to auscultation bilaterally. No wheezes, rales, rhonchi. Normal effort and breath sounds. No respiratory distress  CARDIO: Normal rate, regular rhythm and normal heart sounds. No MGR. ABDOMEN: Soft, non-tender, non-distended, normoactive bowel sounds in all four quadrants. There is no tenderness. There is no rebound and no guarding. BACK: No CVA or spinal tenderness  BREAST:  Deferred  PELVIC:    Deferred   RECTAL:  Deferred   :           Deferred  EXTREMITIES: EXAMINATION OF:   Musculoskeletal:  LOCATION:  Right FOOT/ANKLE  Integumentary: No rashes, skin patches, wounds, or abrasions to the right or left legs                             Warm and normal color. No regions of expressible drainage. Gait:  Slight Limp with gait          Alignment: moderate Bunion Alignment is present,with pronation, with adduction, with toe abutment. no Hammertoe(s) present           Tenderness: mild Medial Eminence of Bunion                               Mild tenderness to the plantar great toe MTP              NONE to Medial Malleolar, 4/5 Met base midfoot, achilles, tib post, or              NONE to syndesmosis.                   NT with distraction or compression of the 1st MTP     Motor/Strength/Tone Exam: Normal Dorsiflexion/Plantar Flexion of a great toe MTP     Sensory Exam: Intact Normal Sensation to ankle/foot      Stability Testing: SPRING back mobility test for 1st metatarsal is positive, mild              1st TMT instability not tested              No anterolateral or varus instability of the Ankle or Subtalar Joints              No peroneal tendon instability noted      ROM: Normal ROM noted to ankle      Contractures: No Achilles or Gastrocnemius Contractures      Calf tenderness: Absent for calf or gastrocnemius muscle regions       Soft, supple, non tender, non taut lower extremity compartments  Alignment: Neutral Hindfoot  Wounds/Abrasions:   None present  Extremities:   No embolic phenomena to the toes or hands                          No significant edema to the foot and or toes.                           Lower extremities are warm and appear well perfused                          DVT: No evidence of DVT seen on examination at this time                          No calf swelling, no tenderness to calf muscles  Lymphatic:  No Evidence of Lymphedema  Vascular: Medial Border of Tibia Region: Edema is not present                   Pulses: Dorsalis Pedis &  Posterior Tibial Pulses : Palpable yes                   Varicosities Lower Limbs :  None    Neuro: Negative bilateral Straight leg raise (seated position)               See Musculoskeletal section for pertinent individual extremity examination               No abnormal hand/wrist, foot/ankle, or facial/neck tremors.       RADIOGRAPHS & DIAGNOSTIC STUDIES:     No notes on file    LABS:     @  CBC:   Lab Results   Component Value Date/Time    WBC 5.1 09/05/2019 01:38 PM    RBC 4.73 09/05/2019 01:38 PM    HGB 15.8 09/05/2019 01:38 PM    HCT 45.7 (H) 09/05/2019 01:38 PM    PLATELET 818 75/01/7970 01:38 PM   , CMP:   Lab Results   Component Value Date/Time    Glucose 89 09/05/2019 01:38 PM    Sodium 143 09/05/2019 01:38 PM    Potassium 4.4 09/05/2019 01:38 PM    Chloride 106 09/05/2019 01:38 PM    CO2 29 09/05/2019 01:38 PM    BUN 13 09/05/2019 01:38 PM    Creatinine 0.93 09/05/2019 01:38 PM    Calcium 9.6 09/05/2019 01:38 PM    Anion gap 8 09/05/2019 01:38 PM    BUN/Creatinine ratio 14 09/05/2019 01:38 PM    Alk. phosphatase 95 09/05/2019 01:38 PM    Protein, total 7.6 09/05/2019 01:38 PM    Albumin 4.6 09/05/2019 01:38 PM    Globulin 3.0 09/05/2019 01:38 PM    A-G Ratio 1.5 09/05/2019 01:38 PM    and Coagulation:   Lab Results   Component Value Date/Time    Prothrombin time 12.2 09/05/2019 01:38 PM    INR 0.9 09/05/2019 01:38 PM       Preoperative labs were reviewed and are substantially within normal limits   Chest X-ray revealed no acute cardiopulmonary process   EKG: normal EKG, normal sinus rhythm. ASSESSMENT:       Encounter Diagnoses   Name Primary?  Osteoarthritis of left foot, unspecified osteoarthritis type Yes    Hammer toe of left foot     Pre-operative examination        PLAN:     Again, the alternatives, risks, and complications, as well as expected outcome were discussed. The patient understands and agrees to proceed with the above listed surgery. Patient has been given Hibiclens wash with instructions and/or prescriptions or orders listed above.     Omega Meredith PA-C  9/9/2019  2:36 PM

## 2019-09-09 NOTE — PATIENT INSTRUCTIONS
Dr. Jodee Cornell Pre-operative Instructions:      Patient: Alessandra Barba   :  1975     I understand I am to stop taking oral birth control pills, hormonal replacement therapy and all Aspirin, Aspirin containing medications, Non-Steroidal Anti-Inflammatory medications (such as Advil, Aleve, Motrin, Ibuprofen) and or Blood thinner medication such as Coumadin, Plavix, Heparin or others 5 days prior to surgery. I understand I am to STOP taking these Medications 5 days prior to surgery:  I am to get instructions from my prescribing physician. 1. __As listed above_______________________  2. _____________________________________  3. _____________________________________  4. _____________________________________    I understand that if I am taking daily medications for high blood pressure, I can take them the morning of surgery with a small sip of water. I will consult my prescribing physician or call [facility] with specific questions. I also understand that:     I am to report important observations or changes that may occur prior to surgery. If I have any changes in my physical condition, such as a rash, a fever, sore throat, abscess, ulcers, nausea, vomiting, or diarrhea. I am to call the office and I am to consult my primary care physician to assess and treat the problem.  I am not to eat or drink anything after midnight the night before my surgery.  I am not to drink alcoholic beverages 24 hours prior to surgery.  I am not to do any illegal drugs prior to surgery.  I am not to smoke at least 24 hours prior to surgery.  I am able and will shower or bathe before surgery. I will use the Hibiclens solution on my surgical site only. The hibiclens directions are one packet a day starting two days before surgery.  I will remove any nail polish, make-up or jewelry prior to arriving for my surgery.       If I wear glasses, contact lenses or dentures they must be removed prior to going to the operating room.  All body piercing and artifical eye-lashes must be removed prior to surgery     I will not wear any aerosol sprays, perfumes or skin creams.  I am to make arrangements for a family member or friend to accompany me to surgery and take me home after my surgery as I will not be allowed to leave the hospital alone. A cab or bus will not be acceptable. Please make arrange for someone to stay with you for 24 hours after surgery.  Patient has expressed understanding of the diagnosis, treatment and planned surgery        Surgery: What to Expect at [address] [place] [address]  This care sheet gives you a general idea about how long it will take for you to recover from your surgery. But each person recovers at a different pace. How can you care for yourself at home? Activity  · Allow your body to heal. Don't move quickly or lift anything heavy until you are feeling better. · Rest when you feel tired. · Your doctor may give you specific instructions on when you can do your normal activities again, such as driving and going back to work. · Be active. Walking is a good choice. Diet  · You can eat your normal diet when you feel well. If your stomach is upset, try bland, low-fat foods like plain rice, broiled chicken, toast, and yogurt. · If your bowel movements are not regular right after surgery, try to avoid constipation and straining. Drink plenty of water. Your doctor may suggest fiber, a stool softener, or a mild laxative. Medicines  · Your doctor will tell you if and when you can restart your medicines. He or she will also give you instructions about taking any new medicines. · If you take blood thinners, such as warfarin (Coumadin), clopidogrel (Plavix), or aspirin, be sure to talk to your doctor. He or she will tell you if and when to start taking those medicines again. Make sure that you understand exactly what your doctor wants you to do. · Be safe with medicines.  Read and follow all instructions on the label. ¨ If the doctor gave you a prescription medicine for pain, take it as prescribed. ¨ If you are not taking a prescription pain medicine, ask your doctor if you can take an over-the-counter medicine. Incision care  · You will have a dressing over the cut (incision). A dressing helps the incision heal and protects it. Your doctor will tell you how to take care of this. · If you have strips of tape on the cut the doctor made, leave the tape on for a week or until it falls off. · If you had stitches, your doctor will tell you when to come back to have them removed. · If you have skin adhesive on the cut, leave it on until it falls off. Skin adhesive is also called liquid stitches. · Change the bandage every day. · Wash the area daily with warm, soapy water, and pat it dry. Don't use hydrogen peroxide or alcohol. They can slow healing. · You may cover the area with a gauze bandage if it oozes fluid or rubs against clothing. · You may shower 24 to 48 hours after surgery. Pat the incision dry. Don't swim or take a bath for the first 2 weeks, or until your doctor tells you it is okay. Follow-up care is a key part of your treatment and safety. Be sure to make and go to all appointments, and call your doctor if you are having problems. It's also a good idea to know your test results and keep a list of the medicines you take. When should you call for help? Call 911 anytime you think you may need emergency care. For example, call if:  · You passed out (lost consciousness). · You have severe trouble breathing. · You have sudden chest pain and shortness of breath, or you cough up blood. Call your doctor now or seek immediate medical care if:  · You have pain that does not get better after you take pain medicine. · You have loose stitches, or your incision comes open. · You are bleeding through your dressing.  A small amount of blood is normal.  · You have signs of infection, such as:  ¨ Increased pain, swelling, warmth, or redness. ¨ Red streaks leading from the incision. ¨ Pus draining from the incision. ¨ A fever. · You have symptoms of a blood clot in your arm or leg (called a deep vein thrombosis). These may include:  ¨ Pain in your calf, back of the knee, thigh, or groin. ¨ Redness and swelling in the arm, leg, or groin. Watch closely for any changes in your health, and be sure to contact your doctor if:  · You do not have a bowel movement after taking a laxative. Where can you learn more? Go to http://scott-heather.info/  Enter W829 in the search box to learn more about \"Surgery: What to Expect at Home. \"  © 4992-7374 [employer]. Care instructions adapted under license by [employer] (which disclaims liability or warranty for this information). This care instruction is for use with your licensed healthcare professional. If you have questions about a medical condition or this instruction, always ask your healthcare professional. [place] [address] any warranty or liability for your use of this information. Content Version: 25.7.275186; Current as of: November 20, 2015          Acute Pain After Surgery: Care Instructions  Your Care Instructions      It's common to have some pain after surgery. Pain doesn't mean that something is wrong or that the surgery didn't go well. But when the pain is severe, it's important to work with your doctor to manage it. It's also important to be aware of a few facts about pain and pain medicine. · You are the only person who knows what your pain feels like. So be sure to tell your doctor when you are in pain or when the pain changes. Then he or she will know how to adjust your medicines. · Pain is often easier to control right after it starts. So it may be better to take regular doses of pain medicine and not wait until the pain gets bad. · Medicine can help control pain.  But this doesn't mean you'll have no pain. Medicine works to keep the pain at a level you can live with. With time, you will feel better. Follow-up care is a key part of your treatment and safety. Be sure to make and go to all appointments, and call your doctor if you are having problems. It's also a good idea to know your test results and keep a list of the medicines you take. How can you care for yourself at home? · Be safe with medicines. Read and follow all instructions on the label. ¨ If the doctor gave you a prescription medicine for pain, take it as prescribed. ¨ If you are not taking a prescription pain medicine, ask your doctor if you can take an over-the-counter medicine. · If you take an over-the-counter pain medicine, such as acetaminophen (Tylenol), ibuprofen (Advil, Motrin), or naproxen (Aleve), read and follow all instructions on the label. · Do not take two or more pain medicines at the same time unless the doctor told you to. · Do not drink alcohol while you are taking pain medicines. · Try to walk each day if your doctor recommends it. Start by walking a little more than you did the day before. Bit by bit, increase the amount you walk. Walking increases blood flow. It also helps prevent pneumonia and constipation. · To prevent constipation from opioid pain medicines:  ¨ Talk to your doctor about a laxative. ¨ Include fruits, vegetables, beans, and whole grains in your diet each day. These foods are high in fiber. ¨ Drink plenty of fluids, enough so that your urine is light yellow or clear like water. Drink water, fruit juice, or other drinks that do not contain caffeine or alcohol. If you have kidney, heart, or liver disease and have to limit fluids, talk with your doctor before you increase the amount of fluids you drink. ¨ Take a fiber supplement, such as Citrucel or Metamucil, every day if needed. Read and follow all instructions on the label.  If you take pain medicine for more than a few days, talk to your doctor before you take fiber. When should you call for help? Call your doctor now or seek immediate medical care if:  ? · Your pain gets worse. ? · Your pain is not controlled by medicine. ? Watch closely for changes in your health, and be sure to contact your doctor if you have any problems. Where can you learn more? Go to http://scott-heather.info/. Enter (48) 332-613 in the search box to learn more about \"Acute Pain After Surgery: Care Instructions. \"  Current as of: March 20, 2017  Content Version: 11.4  © 6442-3943 [employer]. Care instructions adapted under license by [employer] (which disclaims liability or warranty for this information). If you have questions about a medical condition or this instruction, always ask your healthcare professional. [address] any warranty or liability for your use of this information.

## 2019-09-10 RX ORDER — VALACYCLOVIR HYDROCHLORIDE 500 MG/1
500 TABLET, FILM COATED ORAL DAILY
COMMUNITY

## 2019-09-12 ENCOUNTER — ANESTHESIA EVENT (OUTPATIENT)
Dept: SURGERY | Age: 44
End: 2019-09-12
Payer: COMMERCIAL

## 2019-09-13 ENCOUNTER — HOSPITAL ENCOUNTER (OUTPATIENT)
Age: 44
Setting detail: OUTPATIENT SURGERY
Discharge: HOME | End: 2019-09-13
Attending: ORTHOPAEDIC SURGERY | Admitting: ORTHOPAEDIC SURGERY
Payer: COMMERCIAL

## 2019-09-13 ENCOUNTER — APPOINTMENT (OUTPATIENT)
Dept: GENERAL RADIOLOGY | Age: 44
End: 2019-09-13
Attending: ORTHOPAEDIC SURGERY
Payer: COMMERCIAL

## 2019-09-13 ENCOUNTER — ANESTHESIA (OUTPATIENT)
Dept: SURGERY | Age: 44
End: 2019-09-13
Payer: COMMERCIAL

## 2019-09-13 VITALS
BODY MASS INDEX: 26.31 KG/M2 | HEART RATE: 101 BPM | WEIGHT: 183.8 LBS | RESPIRATION RATE: 14 BRPM | SYSTOLIC BLOOD PRESSURE: 118 MMHG | OXYGEN SATURATION: 93 % | DIASTOLIC BLOOD PRESSURE: 73 MMHG | HEIGHT: 70 IN | TEMPERATURE: 98.2 F

## 2019-09-13 LAB — HCG UR QL: NEGATIVE

## 2019-09-13 PROCEDURE — 77030020782 HC GWN BAIR PAWS FLX 3M -B: Performed by: ORTHOPAEDIC SURGERY

## 2019-09-13 PROCEDURE — 76060000037 HC ANESTHESIA 3 TO 3.5 HR: Performed by: ORTHOPAEDIC SURGERY

## 2019-09-13 PROCEDURE — 74011250636 HC RX REV CODE- 250/636

## 2019-09-13 PROCEDURE — C1769 GUIDE WIRE: HCPCS | Performed by: ORTHOPAEDIC SURGERY

## 2019-09-13 PROCEDURE — 74011000250 HC RX REV CODE- 250: Performed by: ANESTHESIOLOGY

## 2019-09-13 PROCEDURE — 77030019908 HC STETH ESOPH SIMS -A: Performed by: ANESTHESIOLOGY

## 2019-09-13 PROCEDURE — 77030002916 HC SUT ETHLN J&J -A: Performed by: ORTHOPAEDIC SURGERY

## 2019-09-13 PROCEDURE — 64450 NJX AA&/STRD OTHER PN/BRANCH: CPT | Performed by: ANESTHESIOLOGY

## 2019-09-13 PROCEDURE — C1713 ANCHOR/SCREW BN/BN,TIS/BN: HCPCS | Performed by: ORTHOPAEDIC SURGERY

## 2019-09-13 PROCEDURE — 76010000133 HC OR TIME 3 TO 3.5 HR: Performed by: ORTHOPAEDIC SURGERY

## 2019-09-13 PROCEDURE — 76210000063 HC OR PH I REC FIRST 0.5 HR: Performed by: ORTHOPAEDIC SURGERY

## 2019-09-13 PROCEDURE — 77030006773 HC BLD SAW OSC BRSM -A: Performed by: ORTHOPAEDIC SURGERY

## 2019-09-13 PROCEDURE — 74011250637 HC RX REV CODE- 250/637: Performed by: NURSE ANESTHETIST, CERTIFIED REGISTERED

## 2019-09-13 PROCEDURE — 77030019605: Performed by: ORTHOPAEDIC SURGERY

## 2019-09-13 PROCEDURE — 74011250636 HC RX REV CODE- 250/636: Performed by: ANESTHESIOLOGY

## 2019-09-13 PROCEDURE — 77030000032 HC CUF TRNQT ZIMM -B: Performed by: ORTHOPAEDIC SURGERY

## 2019-09-13 PROCEDURE — 76942 ECHO GUIDE FOR BIOPSY: CPT | Performed by: ANESTHESIOLOGY

## 2019-09-13 PROCEDURE — 77030037938 HC BLD OSC SAW TREA -B: Performed by: ORTHOPAEDIC SURGERY

## 2019-09-13 PROCEDURE — C1887 CATHETER, GUIDING: HCPCS | Performed by: ORTHOPAEDIC SURGERY

## 2019-09-13 PROCEDURE — 77030040361 HC SLV COMPR DVT MDII -B: Performed by: ORTHOPAEDIC SURGERY

## 2019-09-13 PROCEDURE — 74011250636 HC RX REV CODE- 250/636: Performed by: NURSE ANESTHETIST, CERTIFIED REGISTERED

## 2019-09-13 PROCEDURE — 76210000021 HC REC RM PH II 0.5 TO 1 HR: Performed by: ORTHOPAEDIC SURGERY

## 2019-09-13 PROCEDURE — 74011000250 HC RX REV CODE- 250

## 2019-09-13 PROCEDURE — 77030018836 HC SOL IRR NACL ICUM -A: Performed by: ORTHOPAEDIC SURGERY

## 2019-09-13 PROCEDURE — 77030008683 HC TU ET CUF COVD -A: Performed by: ANESTHESIOLOGY

## 2019-09-13 PROCEDURE — 77030003666 HC NDL SPINAL BD -A: Performed by: ORTHOPAEDIC SURGERY

## 2019-09-13 PROCEDURE — 74011250636 HC RX REV CODE- 250/636: Performed by: PHYSICIAN ASSISTANT

## 2019-09-13 PROCEDURE — 73620 X-RAY EXAM OF FOOT: CPT

## 2019-09-13 PROCEDURE — 77030002933 HC SUT MCRYL J&J -A: Performed by: ORTHOPAEDIC SURGERY

## 2019-09-13 PROCEDURE — 81025 URINE PREGNANCY TEST: CPT

## 2019-09-13 PROCEDURE — 77030014685 HC STIM BN GRWTH PHYSIO EXTERNAL ORTH -I1: Performed by: ORTHOPAEDIC SURGERY

## 2019-09-13 PROCEDURE — 77030031139 HC SUT VCRL2 J&J -A: Performed by: ORTHOPAEDIC SURGERY

## 2019-09-13 PROCEDURE — 77030027670 HC BIT DRL EXAC -C: Performed by: ORTHOPAEDIC SURGERY

## 2019-09-13 PROCEDURE — 77030008684 HC TU ET CUF COVD -B: Performed by: ANESTHESIOLOGY

## 2019-09-13 DEVICE — ANATOMIC BIPLANAR IMPLANTS
Type: IMPLANTABLE DEVICE | Site: FOOT | Status: FUNCTIONAL
Brand: LAPIPLASTY SYSTEM 2

## 2019-09-13 DEVICE — IMPLANTABLE DEVICE: Type: IMPLANTABLE DEVICE | Site: FOOT | Status: FUNCTIONAL

## 2019-09-13 DEVICE — LONG SCREW PACK
Type: IMPLANTABLE DEVICE | Site: FOOT | Status: FUNCTIONAL
Brand: LAPIPLASTY SYSTEM 2

## 2019-09-13 RX ORDER — SODIUM CHLORIDE, SODIUM LACTATE, POTASSIUM CHLORIDE, CALCIUM CHLORIDE 600; 310; 30; 20 MG/100ML; MG/100ML; MG/100ML; MG/100ML
INJECTION, SOLUTION INTRAVENOUS
Status: DISCONTINUED | OUTPATIENT
Start: 2019-09-13 | End: 2019-09-13 | Stop reason: HOSPADM

## 2019-09-13 RX ORDER — SODIUM CHLORIDE, SODIUM LACTATE, POTASSIUM CHLORIDE, CALCIUM CHLORIDE 600; 310; 30; 20 MG/100ML; MG/100ML; MG/100ML; MG/100ML
100 INJECTION, SOLUTION INTRAVENOUS CONTINUOUS
Status: DISCONTINUED | OUTPATIENT
Start: 2019-09-13 | End: 2019-09-13 | Stop reason: HOSPADM

## 2019-09-13 RX ORDER — ONDANSETRON 2 MG/ML
INJECTION INTRAMUSCULAR; INTRAVENOUS AS NEEDED
Status: DISCONTINUED | OUTPATIENT
Start: 2019-09-13 | End: 2019-09-13 | Stop reason: HOSPADM

## 2019-09-13 RX ORDER — NALOXONE HYDROCHLORIDE 0.4 MG/ML
0.04 INJECTION, SOLUTION INTRAMUSCULAR; INTRAVENOUS; SUBCUTANEOUS AS NEEDED
Status: DISCONTINUED | OUTPATIENT
Start: 2019-09-13 | End: 2019-09-13 | Stop reason: HOSPADM

## 2019-09-13 RX ORDER — ONDANSETRON 2 MG/ML
4 INJECTION INTRAMUSCULAR; INTRAVENOUS ONCE
Status: DISCONTINUED | OUTPATIENT
Start: 2019-09-13 | End: 2019-09-13 | Stop reason: HOSPADM

## 2019-09-13 RX ORDER — HYDROMORPHONE HYDROCHLORIDE 2 MG/ML
0.5 INJECTION, SOLUTION INTRAMUSCULAR; INTRAVENOUS; SUBCUTANEOUS
Status: DISCONTINUED | OUTPATIENT
Start: 2019-09-13 | End: 2019-09-13 | Stop reason: HOSPADM

## 2019-09-13 RX ORDER — KETOROLAC TROMETHAMINE 30 MG/ML
INJECTION, SOLUTION INTRAMUSCULAR; INTRAVENOUS AS NEEDED
Status: DISCONTINUED | OUTPATIENT
Start: 2019-09-13 | End: 2019-09-13 | Stop reason: HOSPADM

## 2019-09-13 RX ORDER — LIDOCAINE HYDROCHLORIDE 10 MG/ML
3 INJECTION, SOLUTION EPIDURAL; INFILTRATION; INTRACAUDAL; PERINEURAL ONCE
Status: COMPLETED | OUTPATIENT
Start: 2019-09-13 | End: 2019-09-13

## 2019-09-13 RX ORDER — FAMOTIDINE 20 MG/1
20 TABLET, FILM COATED ORAL ONCE
Status: COMPLETED | OUTPATIENT
Start: 2019-09-13 | End: 2019-09-13

## 2019-09-13 RX ORDER — ALBUTEROL SULFATE 0.83 MG/ML
2.5 SOLUTION RESPIRATORY (INHALATION) AS NEEDED
Status: DISCONTINUED | OUTPATIENT
Start: 2019-09-13 | End: 2019-09-13 | Stop reason: HOSPADM

## 2019-09-13 RX ORDER — NEOSTIGMINE METHYLSULFATE 1 MG/ML
INJECTION INTRAVENOUS AS NEEDED
Status: DISCONTINUED | OUTPATIENT
Start: 2019-09-13 | End: 2019-09-13 | Stop reason: HOSPADM

## 2019-09-13 RX ORDER — SODIUM CHLORIDE 0.9 % (FLUSH) 0.9 %
5-40 SYRINGE (ML) INJECTION EVERY 8 HOURS
Status: DISCONTINUED | OUTPATIENT
Start: 2019-09-13 | End: 2019-09-13 | Stop reason: HOSPADM

## 2019-09-13 RX ORDER — SODIUM CHLORIDE 0.9 % (FLUSH) 0.9 %
5-40 SYRINGE (ML) INJECTION AS NEEDED
Status: DISCONTINUED | OUTPATIENT
Start: 2019-09-13 | End: 2019-09-13 | Stop reason: HOSPADM

## 2019-09-13 RX ORDER — PROPOFOL 10 MG/ML
INJECTION, EMULSION INTRAVENOUS AS NEEDED
Status: DISCONTINUED | OUTPATIENT
Start: 2019-09-13 | End: 2019-09-13 | Stop reason: HOSPADM

## 2019-09-13 RX ORDER — FENTANYL CITRATE 50 UG/ML
100 INJECTION, SOLUTION INTRAMUSCULAR; INTRAVENOUS ONCE
Status: COMPLETED | OUTPATIENT
Start: 2019-09-13 | End: 2019-09-13

## 2019-09-13 RX ORDER — SODIUM CHLORIDE, SODIUM LACTATE, POTASSIUM CHLORIDE, CALCIUM CHLORIDE 600; 310; 30; 20 MG/100ML; MG/100ML; MG/100ML; MG/100ML
50 INJECTION, SOLUTION INTRAVENOUS CONTINUOUS
Status: DISCONTINUED | OUTPATIENT
Start: 2019-09-13 | End: 2019-09-13 | Stop reason: HOSPADM

## 2019-09-13 RX ORDER — FENTANYL CITRATE 50 UG/ML
INJECTION, SOLUTION INTRAMUSCULAR; INTRAVENOUS AS NEEDED
Status: DISCONTINUED | OUTPATIENT
Start: 2019-09-13 | End: 2019-09-13 | Stop reason: HOSPADM

## 2019-09-13 RX ORDER — CEPHALEXIN 250 MG/1
500 CAPSULE ORAL 4 TIMES DAILY
Qty: 20 CAP | Refills: 0 | Status: SHIPPED | OUTPATIENT
Start: 2019-09-13 | End: 2019-09-18

## 2019-09-13 RX ORDER — ROCURONIUM BROMIDE 10 MG/ML
INJECTION, SOLUTION INTRAVENOUS AS NEEDED
Status: DISCONTINUED | OUTPATIENT
Start: 2019-09-13 | End: 2019-09-13 | Stop reason: HOSPADM

## 2019-09-13 RX ORDER — DIPHENHYDRAMINE HYDROCHLORIDE 50 MG/ML
12.5 INJECTION, SOLUTION INTRAMUSCULAR; INTRAVENOUS
Status: DISCONTINUED | OUTPATIENT
Start: 2019-09-13 | End: 2019-09-13 | Stop reason: HOSPADM

## 2019-09-13 RX ORDER — MIDAZOLAM HYDROCHLORIDE 1 MG/ML
INJECTION, SOLUTION INTRAMUSCULAR; INTRAVENOUS AS NEEDED
Status: DISCONTINUED | OUTPATIENT
Start: 2019-09-13 | End: 2019-09-13 | Stop reason: HOSPADM

## 2019-09-13 RX ORDER — INSULIN LISPRO 100 [IU]/ML
INJECTION, SOLUTION INTRAVENOUS; SUBCUTANEOUS ONCE
Status: DISCONTINUED | OUTPATIENT
Start: 2019-09-13 | End: 2019-09-13 | Stop reason: HOSPADM

## 2019-09-13 RX ORDER — SUCCINYLCHOLINE CHLORIDE 20 MG/ML
INJECTION INTRAMUSCULAR; INTRAVENOUS AS NEEDED
Status: DISCONTINUED | OUTPATIENT
Start: 2019-09-13 | End: 2019-09-13 | Stop reason: HOSPADM

## 2019-09-13 RX ORDER — SODIUM CHLORIDE, SODIUM LACTATE, POTASSIUM CHLORIDE, CALCIUM CHLORIDE 600; 310; 30; 20 MG/100ML; MG/100ML; MG/100ML; MG/100ML
75 INJECTION, SOLUTION INTRAVENOUS CONTINUOUS
Status: DISCONTINUED | OUTPATIENT
Start: 2019-09-13 | End: 2019-09-13 | Stop reason: HOSPADM

## 2019-09-13 RX ORDER — MIDAZOLAM HYDROCHLORIDE 1 MG/ML
2 INJECTION, SOLUTION INTRAMUSCULAR; INTRAVENOUS ONCE
Status: COMPLETED | OUTPATIENT
Start: 2019-09-13 | End: 2019-09-13

## 2019-09-13 RX ORDER — LIDOCAINE HYDROCHLORIDE 20 MG/ML
INJECTION, SOLUTION EPIDURAL; INFILTRATION; INTRACAUDAL; PERINEURAL AS NEEDED
Status: DISCONTINUED | OUTPATIENT
Start: 2019-09-13 | End: 2019-09-13 | Stop reason: HOSPADM

## 2019-09-13 RX ORDER — CEFAZOLIN SODIUM 2 G/50ML
2 SOLUTION INTRAVENOUS ONCE
Status: COMPLETED | OUTPATIENT
Start: 2019-09-13 | End: 2019-09-13

## 2019-09-13 RX ORDER — GLYCOPYRROLATE 0.2 MG/ML
INJECTION INTRAMUSCULAR; INTRAVENOUS AS NEEDED
Status: DISCONTINUED | OUTPATIENT
Start: 2019-09-13 | End: 2019-09-13 | Stop reason: HOSPADM

## 2019-09-13 RX ORDER — ROPIVACAINE HYDROCHLORIDE 5 MG/ML
30 INJECTION, SOLUTION EPIDURAL; INFILTRATION; PERINEURAL
Status: COMPLETED | OUTPATIENT
Start: 2019-09-13 | End: 2019-09-13

## 2019-09-13 RX ORDER — DEXAMETHASONE SODIUM PHOSPHATE 4 MG/ML
INJECTION, SOLUTION INTRA-ARTICULAR; INTRALESIONAL; INTRAMUSCULAR; INTRAVENOUS; SOFT TISSUE AS NEEDED
Status: DISCONTINUED | OUTPATIENT
Start: 2019-09-13 | End: 2019-09-13 | Stop reason: HOSPADM

## 2019-09-13 RX ORDER — LIDOCAINE HYDROCHLORIDE 10 MG/ML
0.1 INJECTION, SOLUTION EPIDURAL; INFILTRATION; INTRACAUDAL; PERINEURAL AS NEEDED
Status: DISCONTINUED | OUTPATIENT
Start: 2019-09-13 | End: 2019-09-13 | Stop reason: HOSPADM

## 2019-09-13 RX ADMIN — LIDOCAINE HYDROCHLORIDE 100 MG: 20 INJECTION, SOLUTION EPIDURAL; INFILTRATION; INTRACAUDAL; PERINEURAL at 11:12

## 2019-09-13 RX ADMIN — GLYCOPYRROLATE 0.6 MG: 0.2 INJECTION INTRAMUSCULAR; INTRAVENOUS at 13:56

## 2019-09-13 RX ADMIN — ONDANSETRON 4 MG: 2 INJECTION INTRAMUSCULAR; INTRAVENOUS at 11:44

## 2019-09-13 RX ADMIN — SODIUM CHLORIDE, SODIUM LACTATE, POTASSIUM CHLORIDE, CALCIUM CHLORIDE: 600; 310; 30; 20 INJECTION, SOLUTION INTRAVENOUS at 12:00

## 2019-09-13 RX ADMIN — SODIUM CHLORIDE, SODIUM LACTATE, POTASSIUM CHLORIDE, CALCIUM CHLORIDE: 600; 310; 30; 20 INJECTION, SOLUTION INTRAVENOUS at 11:50

## 2019-09-13 RX ADMIN — MIDAZOLAM HYDROCHLORIDE 2 MG: 2 INJECTION, SOLUTION INTRAMUSCULAR; INTRAVENOUS at 09:10

## 2019-09-13 RX ADMIN — DEXAMETHASONE SODIUM PHOSPHATE 4 MG: 4 INJECTION, SOLUTION INTRA-ARTICULAR; INTRALESIONAL; INTRAMUSCULAR; INTRAVENOUS; SOFT TISSUE at 11:44

## 2019-09-13 RX ADMIN — SODIUM CHLORIDE, SODIUM LACTATE, POTASSIUM CHLORIDE, CALCIUM CHLORIDE: 600; 310; 30; 20 INJECTION, SOLUTION INTRAVENOUS at 11:03

## 2019-09-13 RX ADMIN — FAMOTIDINE 20 MG: 20 TABLET ORAL at 08:31

## 2019-09-13 RX ADMIN — PROPOFOL 150 MG: 10 INJECTION, EMULSION INTRAVENOUS at 11:12

## 2019-09-13 RX ADMIN — NEOSTIGMINE METHYLSULFATE 5 MG: 1 INJECTION INTRAVENOUS at 13:56

## 2019-09-13 RX ADMIN — FENTANYL CITRATE 100 MCG: 50 INJECTION, SOLUTION INTRAMUSCULAR; INTRAVENOUS at 11:58

## 2019-09-13 RX ADMIN — LIDOCAINE HYDROCHLORIDE 3 ML: 10 INJECTION, SOLUTION EPIDURAL; INFILTRATION; INTRACAUDAL; PERINEURAL at 09:11

## 2019-09-13 RX ADMIN — KETOROLAC TROMETHAMINE 15 MG: 30 INJECTION, SOLUTION INTRAMUSCULAR; INTRAVENOUS at 13:57

## 2019-09-13 RX ADMIN — SODIUM CHLORIDE, SODIUM LACTATE, POTASSIUM CHLORIDE, AND CALCIUM CHLORIDE 75 ML/HR: 600; 310; 30; 20 INJECTION, SOLUTION INTRAVENOUS at 08:30

## 2019-09-13 RX ADMIN — ROCURONIUM BROMIDE 5 MG: 10 INJECTION, SOLUTION INTRAVENOUS at 11:12

## 2019-09-13 RX ADMIN — DEXAMETHASONE SODIUM PHOSPHATE 4 MG: 4 INJECTION, SOLUTION INTRA-ARTICULAR; INTRALESIONAL; INTRAMUSCULAR; INTRAVENOUS; SOFT TISSUE at 11:22

## 2019-09-13 RX ADMIN — FENTANYL CITRATE 100 MCG: 50 INJECTION INTRAMUSCULAR; INTRAVENOUS at 09:10

## 2019-09-13 RX ADMIN — FENTANYL CITRATE 50 MCG: 50 INJECTION, SOLUTION INTRAMUSCULAR; INTRAVENOUS at 11:52

## 2019-09-13 RX ADMIN — FENTANYL CITRATE 100 MCG: 50 INJECTION, SOLUTION INTRAMUSCULAR; INTRAVENOUS at 11:03

## 2019-09-13 RX ADMIN — CEFAZOLIN 2 G: 10 INJECTION, POWDER, FOR SOLUTION INTRAVENOUS at 11:17

## 2019-09-13 RX ADMIN — ROPIVACAINE HYDROCHLORIDE 150 MG: 5 INJECTION, SOLUTION EPIDURAL; INFILTRATION; PERINEURAL at 09:12

## 2019-09-13 RX ADMIN — FENTANYL CITRATE 100 MCG: 50 INJECTION, SOLUTION INTRAMUSCULAR; INTRAVENOUS at 13:41

## 2019-09-13 RX ADMIN — SUCCINYLCHOLINE CHLORIDE 140 MG: 20 INJECTION INTRAMUSCULAR; INTRAVENOUS at 11:12

## 2019-09-13 RX ADMIN — FENTANYL CITRATE 50 MCG: 50 INJECTION, SOLUTION INTRAMUSCULAR; INTRAVENOUS at 13:53

## 2019-09-13 RX ADMIN — MIDAZOLAM HYDROCHLORIDE 2 MG: 1 INJECTION, SOLUTION INTRAMUSCULAR; INTRAVENOUS at 11:03

## 2019-09-13 RX ADMIN — ROCURONIUM BROMIDE 25 MG: 10 INJECTION, SOLUTION INTRAVENOUS at 11:35

## 2019-09-13 NOTE — ANESTHESIA PROCEDURE NOTES
Peripheral Block    Start time: 9/13/2019 9:09 AM  End time: 9/13/2019 9:25 AM  Performed by: Lucita Murcia MD  Authorized by: Lucita Murcia MD       Pre-procedure: Indications: at surgeon's request and post-op pain management    Preanesthetic Checklist: patient identified, risks and benefits discussed, site marked, timeout performed, anesthesia consent given and patient being monitored    Timeout Time: 09:09          Block Type:   Block Type:  Popliteal  Laterality:  Right    Assessment:    Injection Assessment:     Single Shot Nerve Block Procedure Note    Patient: Dexter Dutton MRN: [id number]  [id number]: [id number]   YOB: 1975  Age: 45 y.o. Sex: female      Cardiovascular Function/Vital Signs  There were no vitals taken for this visit. Blocks Sciatic  Referring physician:   : Trevon Rincon MD    Indication: Post-operative analgesia per surgeon's request  Location: Preoperative Holding area. Sedation: Midazolam 2 mg, fentanyl 100 mcg  Time out performed, correct patient, side, site, and procedure verified. Patient placed in supine position. Monitors/oxygen applied; right above knee marked and prepped with chloraprep. Target nerves identified by ultrasound and nerve stimulator, 20 ml's 0.5% Ropivicaine injected in divided doses with negative aspiration through 21 gauge 10 cm Stimuplex insulated needle. Nerve stimulator set up 1.5 mA, 0.1 mS, 2 HZ. Toe/calf twitch of sciatic nerve. Depth of needle at time of injection 4-5 cm. Block Notes:   Incremental injection    Blood aspirated:  NO    Persistent Pain with injection:  NO    Resistance to injection:  NO    Events:  None - Easy and well-tolerated:  YES       Difficult:  NO   Ultrasound guidance used for needle placement  Nerves and surrounding structures ID'd  Perineural injection   No IV injection  Patient tolerated procedure well, vital signs stable throughout, with no apparent complications.   9/13/2019  9:48 AM  Trevon Rincon MD

## 2019-09-13 NOTE — INTERVAL H&P NOTE
H&P Update:  Eloy Lr was seen and examined. History and physical has been reviewed. The patient has been examined. There have been no significant clinical changes since the completion of the originally dated History and Physical.  Patient identified by surgeon; surgical site was confirmed by patient and surgeon.

## 2019-09-13 NOTE — BRIEF OP NOTE
BRIEF OPERATIVE NOTE    Date of Procedure: 9/13/2019   Preoperative Diagnosis: M21.619 BUNION OF GREAT TOE  M21.611 BUNION, RIGHT  Postoperative Diagnosis:  AS ABOVE, INCREASED LAXITY 1ST TMT RIGHT  Procedure(s):  RIGHT LAPIDUS, MODIFIED PEREZ/AKIN PROCEDURE/C-ARM/LAPIPLASTY TRACE II/ORTHOFIX 30 MM HEADLESS COMPRESSION SCREW/ORTHOFIX BONE STIMULATOR/NERVE BLOCK  Surgeon(s) and Role:     * Star Zambrano MD - Primary         Surgical Assistant:  Davin CABRERA    Surgical Staff:  Circ-1: Marielle Sarabia RN  Physician Assistant: (Unknown)  Scrub Tech-1: Esa   Surg Asst-1: Steve Franco PA:  ASSISTANT   Davin Reyes PA:  ASSISTANT    Shelley Cotto MPA, MA, PA-C was medically necessary for the procedure. She assisted in patient positioning, retraction, placement of hardware, wound closure, placement of DME, and transfer of the patient to the PACU. No case tracking events are documented in the log. Anesthesia: General and Regional  Estimated Blood Loss: 40 ML  IV FLUIDS:   1700 ml IVF  Tourniquet Time:  103 minutes at  325  Mm HG   Specimens: * No specimens in log *   Findings:  BUNION DEFORMITY/INCREASED SPRING BACK 1ST TMT   Complications: NONE  Implants:   Implant Name Type Inv.  Item Serial No.  Lot No. LRB No. Used Action   SCR BNE LNG PK 2.7MM STRL -- LAPIPLASTY SYSTEM 2 - [id number]  SCR BNE LNG PK 2.7MM STRL -- LAPIPLASTY SYSTEM 2  [id number] Right 2 Implanted   IMPL BIPLANAR IRMA STRL -- LAPIPLASTY SYSTEM 2 - [id number]  IMPL BIPLANAR IRMA STRL -- LAPIPLASTY SYSTEM 2  [id number] Right 1 Implanted   SCR BNE ALVA HDLESS 3X36MM -- EPIC CANNULATED SCREW SYSTEM - [id number]  SCR BNE ALVA HDLESS 3X36MM -- EPIC CANNULATED SCREW SYSTEM  [employer] N/A Right 1 Implanted

## 2019-09-13 NOTE — PROGRESS NOTES
Discharge instructions signed by patient on signature pad; computer locked up; unsure if signature was saved. Patient voices understanding of discharge instructions and questions answered.

## 2019-09-13 NOTE — DISCHARGE INSTRUCTIONS
Learning About the Safe Use of Antibiotics  Introduction    Antibiotics are drugs used to kill bacteria. Bacteria can cause infections. These include strep throat, ear infections, and pneumonia. These medicines can't cure everything. They don't kill viruses or help with allergies. They don't help illnesses such as the common cold, the flu, or a runny nose. And they can cause side effects. There are many types of antibiotics. Your doctor will decide which one will work best for your infection. Examples include:  · Amoxicillin. · Cephalexin (Keflex). · Ciprofloxacin (Cipro). What are the possible side effects? Side effects can include:  · Nausea. · Diarrhea. · Skin rash. · Yeast infection. · A severe allergic reaction. It may cause itching, swelling, and breathing problems. This is rare. You may have other side effects or reactions not listed here. Check the information that comes with your medicine. Should you take antibiotics just in case? Don't take antibiotics when you don't need them. If you do that, they may not work when you do need them. Each time you take antibiotics, you are more likely to have some bacteria that survive and aren't killed by the medicine. Bacteria that don't die can change and become even harder to kill. These are called antibiotic-resistant bacteria. They can cause longer and more serious infections. To treat them, you may need different, stronger antibiotics that have more side effects and may cost more. So always ask your doctor if antibiotics are the best treatment. Explain that you do not want antibiotics unless you need them. Help protect the community  Using antibiotics when they're not needed leads to the development of antibiotic-resistant bacteria. These tougher bacteria can spread to family members, children, and coworkers. People in your community will have a risk of getting an infection that is harder to cure and that costs more to treat.   How can you take antibiotics safely? Be safe with medicine. Take your antibiotics as directed. Do not stop taking them just because you feel better. You need to take the full course of medicine. This will help make sure your infection is cured. It will also help prevent the growth of antibiotic-resistant bacteria. Always take the exact amount that the label says to take. If the label says to take the medicine at a certain time, follow those directions. You might feel better after you take an antibiotic for a few days. But it is important to keep taking it for as long as prescribed. That will help you get rid of those bacteria that are a bit stronger and that survive the first few days of treatment. Where can you learn more? Go to http://scott-heather.info/. Enter F695 in the search box to learn more about \"Learning About the Safe Use of Antibiotics. \"  Current as of: July 30, 2018  Content Version: 12.1  © 4362-8392 [employer]. Care instructions adapted under license by [employer] (which disclaims liability or warranty for this information). If you have questions about a medical condition or this instruction, always ask your healthcare professional. [place] [address] any warranty or liability for your use of this information. DISCHARGE SUMMARY from Nurse    PATIENT INSTRUCTIONS:    After general anesthesia or intravenous sedation, for 24 hours or while taking prescription Narcotics:  · Limit your activities  · Do not drive and operate hazardous machinery  · Do not make important personal or business decisions  · Do  not drink alcoholic beverages  · If you have not urinated within 8 hours after discharge, please contact your surgeon on call.     Report the following to your surgeon:  · Excessive pain, swelling, redness or odor of or around the surgical area  · Temperature over 100.5  · Nausea and vomiting lasting longer than 4 hours or if unable to take medications  · Any signs of decreased circulation or nerve impairment to extremity: change in color, persistent  numbness, tingling, coldness or increase pain  · Any questions    What to do at Home:  Recommended activity: Activity as tolerated and no driving for today. *  Please give a list of your current medications to your Primary Care Provider. *  Please update this list whenever your medications are discontinued, doses are      changed, or new medications (including over-the-counter products) are added. *  Please carry medication information at all times in case of emergency situations. These are general instructions for a healthy lifestyle:    No smoking/ No tobacco products/ Avoid exposure to second hand smoke  Surgeon General's Warning:  Quitting smoking now greatly reduces serious risk to your health. Obesity, smoking, and sedentary lifestyle greatly increases your risk for illness    A healthy diet, regular physical exercise & weight monitoring are important for maintaining a healthy lifestyle    You may be retaining fluid if you have a history of heart failure or if you experience any of the following symptoms:  Weight gain of 3 pounds or more overnight or 5 pounds in a week, increased swelling in our hands or feet or shortness of breath while lying flat in bed. Please call your doctor as soon as you notice any of these symptoms; do not wait until your next office visit. The discharge information has been reviewed with the patient and friend. The patient and friend verbalized understanding. Discharge medications reviewed with the patient and friend and appropriate educational materials and side effects teaching were provided.   ___________________________________________________________________________________________________________________________________     Bunionectomy: What to Expect at [address] [place] [address] had bunion surgery to remove a lump of bone (bunion) from the joint where your big toe joins your foot, and to straighten your big toe. You will have pain and swelling that slowly improves in the 6 weeks after surgery. You may have some minor pain and swelling that lasts as long as 6 months to a year. After surgery, you will need to wear a cast or a special type of shoe to protect your toe and to keep it in the right position for at least 3 to 6 weeks. After some types of surgeries, a cast or special shoe is used for a few months. Your doctor will remove your stitches or sutures about 2 weeks after the surgery. If you have removable pins holding your toe in place, they are usually removed in about 4 to 6 weeks. This care sheet gives you a general idea about how long it will take for you to recover. But each person recovers at a different pace. Follow the steps below to get better as quickly as possible. How can you care for yourself at home? Activity    · Rest when you feel tired. Getting enough sleep will help you recover.     · Ask your doctor when you can drive again.     · You may shower, unless your doctor tells you not to. Keep the bandage dry. If the bandage has been removed, you can wash the area with warm water and soap. Pat the area dry.     · You will probably need to take several weeks off from work. How much time you need to take off depends on the type of work you do and the extent of your surgery.     · You may need to avoid heavy lifting for 3 to 8 weeks or longer, depending on the type of surgery you had.     · You may need to do regular rehabilitation (rehab) exercises to strengthen your foot and improve movement. Start out slowly, and follow your doctor's instructions. Diet    · You can eat your normal diet. If your stomach is upset, try bland, low-fat foods like plain rice, broiled chicken, toast, and yogurt.     · You may notice that your bowel movements are not regular right after your surgery. This is common.  Try to avoid constipation and straining with bowel movements. You may want to take a fiber supplement every day. If you have not had a bowel movement after a couple of days, ask your doctor about taking a mild laxative. Medicines    · Your doctor will tell you if and when you can restart your medicines. He or she will also give you instructions about taking any new medicines.     · If you take blood thinners, such as warfarin (Coumadin), clopidogrel (Plavix), or aspirin, be sure to talk to your doctor. He or she will tell you if and when to start taking those medicines again. Make sure that you understand exactly what your doctor wants you to do.     · Be safe with medicines. Take pain medicines exactly as directed. ? If the doctor gave you a prescription medicine for pain, take it as prescribed. ? If you are not taking a prescription pain medicine, ask your doctor if you can take an over-the-counter medicine.     · If your doctor prescribed antibiotics, take them as directed. Do not stop taking them just because you feel better. You need to take the full course of antibiotics.     · If you think your pain medicine is making you sick to your stomach:  ? Take your medicine after meals (unless your doctor has told you not to). ? Ask your doctor for a different pain medicine. Incision care    · You will leave the hospital with bandages holding your toe in the correct position. Your doctor will probably remove the bandages after several days. Or your doctor may have you remove your bandages at home. Do not touch the surgery area. Keep it dry.     · Do not soak your foot until your doctor says it is okay. Ice and elevation    · For pain and swelling, put ice or a cold pack on your foot for 10 to 20 minutes each hour. Put a thin cloth between the ice and your skin.     · Prop up your foot and leg on a pillow when you ice it or anytime you sit or lie down during the next 3 days. Try to keep it above the level of your heart.  This will help reduce swelling. Follow-up care is a key part of your treatment and safety. Be sure to make and go to all appointments, and call your doctor if you are having problems. It's also a good idea to know your test results and keep a list of the medicines you take. When should you call for help? Call 911 anytime you think you may need emergency care. For example, call if:    · You passed out (lost consciousness).     · You have sudden chest pain and shortness of breath, or you cough up blood.     · You have severe trouble breathing.    Call your doctor now or seek immediate medical care if:    · Your foot or toe is cool or pale or changes color.     · You have numbness, tingling, or less feeling in your foot or toes.     · You have pain that does not get better after you take pain medicine.     · You have loose stitches, or your incision comes open.     · Bright red blood has soaked through the bandage over your incision.     · You have signs of infection, such as:  ? Increased pain, swelling, warmth, or redness. ? Red streaks leading from the incision. ? Pus draining from the incision. ? Swollen lymph nodes in your neck, armpits, or groin. ? A fever.    Watch closely for any changes in your health, and be sure to contact your doctor if:    · You do not have a bowel movement after taking a laxative. Where can you learn more? Go to http://scott-heather.info/. Enter 0699 472 39 89 in the search box to learn more about \"Bunionectomy: What to Expect at Home. \"  Current as of: September 20, 2018  Content Version: 12.1  © 7357-6004 [employer]. Care instructions adapted under license by [employer] (which disclaims liability or warranty for this information). If you have questions about a medical condition or this instruction, always ask your healthcare professional. [address] any warranty or liability for your use of this information.          ORTHOPAEDIC DISCHARGE PLAN     1. DISCHARGE DISPOSITION:  Home  2. FOLLOW UP RETURN TO OFFICE: 5 days    Call [phone] to confirm your appointment to see  [facility]. Monserrat Mckinley MD.   Follow up with Primary Care Provider in 7 to 10 days. 3. WEIGHT BEARING STATUS/ROM:    NO WEIGHT BEARING TO   the Right LOWER EXTREMITY  4. ELEVATE the Right lower extremity on 1 pillow. Place the pillow horizontal so that no pressure is on the back of your heel    Please leave your current dressings and in place. Keep your dressings clean and dry to the: Right lower extremity      Please call [phone] ([address]) if any: fever, shakes, chills, intractable pain, or for any questions you have regarding your care/medical condition   1. If you experience any calf pain or swelling, or are having any shortness of  breath, chest pain, or extremity swelling, or bleeding thru any surgical dressings,  or Bleeding at any body location while you are taking on any blood thinners. ie (mouth,nose, skin sites:)   2. Please go to closest ER  [facility] for assessment to rule out a leg clot and to  assess any bleeding. 3. After general anesthesia or intravenous sedation, for 24 hours or while taking  prescription Narcotics:      [x]  Limit your activities     [x]   Do not drive and operate hazardous machinery    [x]   Do not make important personal or business decisions    [x]   Do  not drink alcoholic beverages    [x]  If you have not urinated within 8 hours after discharge, please contact    your surgeon on call.      Report the following to your surgeon: If any below is true         [x]   Excessive pain, swelling, redness or odor of or around the surgical area       [x]   Temperature over 100.5       [x]  Nausea and vomiting lasting longer than 4 hours or if unable to take medications       [x]    Any signs of decreased circulation or nerve impairment to extremity:    Change in color, persistent  numbness, tingling, coldness or increase pain [x]  No smoking/ No tobacco products/ Avoid exposure to second hand smoke    5. DIET:  Regular Diet  OTC (Nutritional supplements/multivitamins/calcium w/ Vitamin  D   6. ACTIVITY:   // No lifting, twisting, squatting, deep bending. 7. INCISION CARE/DRESSINGS: Keep wound clean and dry ,ACE Wraps    Keep all pets away from  any wound present in order to prevent infection. 8. PAIN CONTROL: Prescriptions written: Norco 7.5 mg: RX WRITTEN ALREADY     Start taking your pain medications when you get home  9. VTE prophylaxis : Hold off on Xeralto for now . 10. ANTIBIOTICS: Keflex 500 m PO QID; 5 days, 0 refills.         Maverick Camacho MD  2019  2:03 PM

## 2019-09-13 NOTE — ANESTHESIA PREPROCEDURE EVALUATION
Relevant Problems   No relevant active problems       Anesthetic History   No history of anesthetic complications            Review of Systems / Medical History  Patient summary reviewed and pertinent labs reviewed    Pulmonary          Smoker         Neuro/Psych              Cardiovascular                       GI/Hepatic/Renal                Endo/Other             Other Findings              Physical Exam    Airway  Mallampati: II  TM Distance: 4 - 6 cm  Neck ROM: normal range of motion   Mouth opening: Normal     Cardiovascular    Rhythm: regular  Rate: normal         Dental    Dentition: Poor dentition     Pulmonary  Breath sounds clear to auscultation               Abdominal  GI exam deferred       Other Findings            Anesthetic Plan    ASA: 2  Anesthesia type: general      Post-op pain plan if not by surgeon: peripheral nerve block single    Induction: Intravenous  Anesthetic plan and risks discussed with: Patient

## 2019-09-13 NOTE — ANESTHESIA POSTPROCEDURE EVALUATION
Procedure(s):  RIGHT LAPIDUS, MODIFIED PEREZ/AKIN PROCEDURE/C-ARM/LAPIPLASTY TRACE II/ACUMED/ORTHOFIX BONE STIMULATOR/NERVE BLOCK.    general    Anesthesia Post Evaluation      Multimodal analgesia: multimodal analgesia used between 6 hours prior to anesthesia start to PACU discharge  Patient location during evaluation: bedside  Patient participation: complete - patient participated  Level of consciousness: awake  Pain management: adequate  Airway patency: patent  Anesthetic complications: no  Cardiovascular status: stable  Respiratory status: acceptable  Hydration status: acceptable  Post anesthesia nausea and vomiting:  controlled      Vitals Value Taken Time   /64 9/13/2019  2:57 PM   Temp 37.2 °C (98.9 °F) 9/13/2019  2:07 PM   Pulse 101 9/13/2019  3:00 PM   Resp 20 9/13/2019  3:00 PM   SpO2 92 % 9/13/2019  3:00 PM   Vitals shown include unvalidated device data.

## 2019-09-13 NOTE — ANESTHESIA PROCEDURE NOTES
Peripheral Block    Start time: 9/13/2019 9:25 AM  End time: 9/13/2019 9:31 AM  Performed by: Alycia Rivero MD  Authorized by: Alycia Rivero MD       Pre-procedure: Indications: at surgeon's request and post-op pain management    Preanesthetic Checklist: patient identified, risks and benefits discussed, site marked, timeout performed, anesthesia consent given and patient being monitored    Timeout Time: 09:09          Block Type:   Block Type:  Saphenous  Laterality:  Right    Assessment:    Injection Assessment:     Single Shot Nerve Block Procedure Note    Patient: Johnathan Nova MRN: [id number]  SSN: [id number]   YOB: 1975  Age: 45 y.o. Sex: female      Cardiovascular Function/Vital Signs  There were no vitals taken for this visit. Blocks Saphenous  Referring physician:   : Abran Carl MD    Indication: Post-operative analgesia per surgeon's request  Location: Preoperative Holding area. Sedation: Midazolam 0mg, fentanyl 0mcg  Time out performed, correct patient, side, site, and procedure verified. Patient placed in supine position. Monitors/oxygen applied; right mid thigh  marked and prepped with chloraprep. Target nerves identified by ultrasound, 10 ml's 0.5% Ropivicaine injected in divided doses with negative aspiration through 21 gauge 10 cm Stimuplex insulated needle. Depth of needle at time of injection 5 cm. Block Notes:   Incremental injection    Blood aspirated:  NO    Persistent Pain with injection:  NO    Resistance to injection:  NO    Events:  None - Easy and well-tolerated:  YES       Difficult:  NO   Ultrasound guidance used for needle placement  Nerves and surrounding structures ID'd  Perineural injection   No IV injection  Patient tolerated procedure well, vital signs stable throughout, with no apparent complications.   9/13/2019  9:51 AM  Abran Carl MD

## 2019-09-14 NOTE — OP NOTES
[address] [place] [address]   OPERATIVE REPORT    Name:  Abigail Gaona  MR#:   [id number]  :  1975  ACCOUNT #:  [de-identified]  DATE OF SERVICE:  2019    PREOPERATIVE DIAGNOSIS:  RIGHT  bunion of right lower extremity with increased laxity of right tarsometatarsal joint. POSTOPERATIVE DIAGNOSIS:  RIGHT buion of right lower extremity with increased laxity of right tarsometatarsal joint. PROCEDURES PERFORMED:  1. Right Lapidus first tarsometatarsal joint arthrodesis. 2.  Modified Avina distal soft tissue. SURGEON:  Christian Thomas MD    ASSISTANT:  THONY Duenas [place], MPA, PA-C, was medically necessary for the procedure. She assisted in patient positioning, surgical incision, retraction, placement of hardware, wound closure, incision closure, placement of DME, placement of well-padded posterior splint, and transfer of the patient to the PACU. FLUIDS:  1700 mL Crystalloid. ANESTHESIA:  General with regional block anesthesia. COMPLICATIONS:  None. TOURNIQUET TIME:  89 min at  300 mm hg    SPECIMENS REMOVED:  None. IMPLANTS:  TREACE SYSTEM 2 PLATES AND SCREWS l    ESTIMATED BLOOD LOSS:  40 mL. FINDINGS:  Right first tarsometatarsal joint   bunion deformity,  . CLINICAL HISTORY:  The patient is a 43-year-old female who has a bunion deformity. I  had a lengthy discussion with her regarding her bunion deformity. I explained to her that there is no way that I can promise her that she can fit in a specific shoe. that she experienced any specific complication postoperatively. She understands this  . The risks of surgery include but are not limited to bleeding, infection,  DVT, PE, death, RSD, paresthesias, numbness, subcutaneous hematoma, postsurgical scar, postoperative delayed healing, nonhealing, and possible wound complications. She voiced understanding of the risks of surgery and an informed decision was made to proceed.  No promises were made to her that she could fit into a specific shoe style/type. OPERATIVE NOTE:                Osvaldo Quiñonez is  taken to the operating room today on 9/13/2019 . Regional block anesthesia was already performed to the right lower extremity prior to her entering the operating room. Osvaldo Quiñonez  entered the operating room and was supine. General anesthesia was conducted. The entire right lower extremity from the tip of toes going all the way up to the knee was fully prepped with chlorhexidine gluconate scrub, chlorhexidine gluconate prep stick (3).    A formal timeout was then conducted, identifying correct patient, correct limb, correct location for surgery, confirmation the patient did receive antibiotics, and that I can see my written signature . All members of the surgical team present agreed to sign timeout, as such I elected to proceed.     I exsanguinated the right lower extremity using a tourniquet and insufflated the tourniquet to  300 mm HG. Then, I removed the Esmarch. I used a marking pen to delineate my part anatomy. The medial portion of the great toe was demarcated. The first interdigital web space demarcation was demarcated as well as a dorsal longitudinal demarcation was centered over the first tarsometatarsal region. I first made a 4-5 cm incision over the first TMT region. Incision made through skin and subcutaneous tissue directly on the first TMT region. The first tarsometatarsal joint was identified and dissected out and the joint capsule was released. I then followed the [address] technique. I first again identified the first TMT region. I made sure that it was nice and mobile, placed a derotation pin at the proximal portion of the first metatarsal.  I then made an incision in the first webspace. Incision made through skin and subcutaneous tissues directly down to the recurrent tight structures laterally.   I released the capsule laterally, a small portion of the capsule, as well as the superior suspensory ligament, above the lateral sesamoid. This allowed me to hold the toe in about 10 degrees of varus tension free without MTP joint subluxation. I then placed the fulcrum at the proximal lateral portion of the 1st metatarsal and then made a small incision in the second metatarsal such that I could place the (LAPIPLASTY Positioner) positioning instrumentation that would allow me to decrease the SKYLER angle. I then secured this in place, made sure that my first metatarsal was properly derotated (checking toe/metatarsal and sesamoid position. I was very happy with the LAPIPLASTY positioner and the confirmed improved sesamoid alignment distally as we all as correction of the SKYLER. I made sure this was secured in place. I then checked under fluoroscopy, to make sure I was happy with the overall alignment. I then placed the LAPIPLASTY cutting guide in place (after placing the joint seeker), securing the cutting guide in place, making sure that this is positioned directly down to bone using the 3 pins. I checked under biplanar C arm to make sure that I was able to remove/resect the appropriate amount of bone. I then performed my LAPIPLASTY cuts, removed the cutting guide, removed the bone, at this proposed first tarsometatarsal joint arthrodesis using a rongeur and a pituitary rongeur. I then prepared this after a little bit of irrigation. I then prepared the first tarsometatarsal region for arthrodesis. I made sure that again the metatarsal was properly derotated. I then used a LAPIPLASTY Treace 2 compression guide, compressed quite nicely. The bone was in excellent opposition. I then secured and definitively fixed the 1st TMT with the following hardware:  26   mm compression screw across the first TMT region using standard technique, guidewire, measuring, drilling and placing the appropriate length 3.6 x 26 mm  compression screw.   Excellent compression was obtained and maintained. I then used a Vignesh Han #2 dorsal and medial plates, secured them in place provisionally, then screwed them in place with appropriate length screws. Excellent stability was obtained and maintained of this construct.     Bi planar Fluoroscopic films identified excellent compression and positioning of the first tarsometatarsal joint proposed arthrodesis.     I then made a medial incision, incision made through skin and subcutaneous tissue directly down to the very thickened capsule. I removed 5 mm of redundant capsule, making a parallel incision. An inverted L capsulotomy was  Conducted and I did have to actually shave the bone medially. I then tightened my capsule, after thorough irrigation, using 2-0 and 3-0 Vicryl. I was happy with the alignment. I did not have to perform an Akin osteotomy, as the toe alignment looks good and I did not want any varus malalignment. Excellent alignment was obtained and maintained. After thorough irrigation, the tourniquet down, I was very pleased with the overall alignment. All incisions were closed with 3-0 Vicryl and then 3-0 nylon. Prior to closure, all needle counts, tape counts and instruments were noted and documented in chart. Sterile dressing was placed, consisting of Xeroform, 4 x 4's, sterile 4-inch Kerlix and then a well-padded posterior splint was applied. Standard splinting material:  Stockinette 4 x 4's, a cast padding and a posterior splint.     Selective electrocautery was used for hemostasis, prior to closure. Prior to closure, correct needle count, tape counts, and instruments were noted and documented in the chart.  Clay Dumont is sent to recovery room PACU in stable condition.                         Patient: Clay Dumont                                              MRN:@                                                                          SSN: [id number]   YOB: 1975      AGE: 45 y.o.                SEX:  female         Radha CABRERA function was my first assistant in surgery.          She assisted me in this surgery by performing the followin. Assist in [address] @ on the OR table. 2. Providing a constant clear visualization of my operative field by providing timely                      suction, operative light optimization of the surgical field  3. Providing appropriate electrocautery, and assisting in incision closure, placement of               surgical dressings/splinting.  Radha CABRERA's role facilitated my conducting                        the surgery more efficiently.        Lefty Abreu MD  .          CARLI/MARY [id number]  D:  2019 23:04  T:  2019 5:51  JOB #:  [id number]

## 2019-09-18 ENCOUNTER — OFFICE VISIT (OUTPATIENT)
Dept: ORTHOPEDIC SURGERY | Age: 44
End: 2019-09-18

## 2019-09-18 VITALS
DIASTOLIC BLOOD PRESSURE: 87 MMHG | HEIGHT: 70 IN | OXYGEN SATURATION: 98 % | HEART RATE: 108 BPM | RESPIRATION RATE: 16 BRPM | WEIGHT: 182 LBS | SYSTOLIC BLOOD PRESSURE: 116 MMHG | BODY MASS INDEX: 26.05 KG/M2 | TEMPERATURE: 97.8 F

## 2019-09-18 DIAGNOSIS — M19.072 OSTEOARTHRITIS OF LEFT FOOT, UNSPECIFIED OSTEOARTHRITIS TYPE: ICD-10-CM

## 2019-09-18 DIAGNOSIS — M21.611 BUNION, RIGHT: ICD-10-CM

## 2019-09-18 DIAGNOSIS — G89.18 POST-OPERATIVE PAIN: Primary | ICD-10-CM

## 2019-09-18 DIAGNOSIS — Z98.890 POST-OPERATIVE STATE: ICD-10-CM

## 2019-09-18 RX ORDER — HYDROCODONE BITARTRATE AND ACETAMINOPHEN 5; 325 MG/1; MG/1
1 TABLET ORAL
Qty: 42 TAB | Refills: 0 | Status: SHIPPED | OUTPATIENT
Start: 2019-09-18 | End: 2019-10-02 | Stop reason: SDUPTHER

## 2019-09-18 RX ORDER — ASPIRIN 325 MG
325 TABLET ORAL DAILY
Qty: 30 TAB | Refills: 0 | Status: SHIPPED | OUTPATIENT
Start: 2019-09-18 | End: 2021-04-09

## 2019-09-18 NOTE — PROGRESS NOTES
Patient: Claudene Saber                MRN: [id number]       SSN: [id number]  YOB: 1975        AGE: 45 y.o. SEX: female    PCP:Unknown, Provider      Chief Complaint:   Chief Complaint   Patient presents with    Ankle Pain     right       HPI:     The patient is a 45 y.o. female who presents today for follow up 5 days s/p:     Right Lapidus, Modified Florence/akin Procedure/c-arm/lapiplasty Trace Ii/acumed/orthofix Bone Stimulator/nerve Block - Right. DOS: 9/13/19    Patient has been NWB to the right lower extremity. Patient reports doing well other than post op pain and constipation. Patient denies any fever, chills, chest pain, shortness of breath or calf pain. She states she slipped of her crutches and had to apply weight on her right foot. She had some increased pain that resolve. Otherwise, there are no new illness or injuries to report since last seen in the office. Patient is on no medication for DVT prophylaxis. She has been using the bone stimulator. She is working from home and would like a less strong pain medication. PHYSICAL EXAM:     Visit Vitals  /87 (BP 1 Location: Left arm, BP Patient Position: Sitting)   Pulse (!) 108   Temp 97.8 °F (36.6 °C) (Oral)   Resp 16   Ht 5' 10\" (1.778 m)   Wt 182 lb (82.6 kg)   SpO2 98%   BMI 26.11 kg/m²         Pain Scale: 6/10    Pain Assessment  9/18/2019   Location of Pain Foot   Pain Location Comment -   Location Modifiers Right   Severity of Pain 6   Quality of Pain Throbbing; Karan Bodily; Aching;Burning   Duration of Pain Persistent   Frequency of Pain Constant   Aggravating Factors Other (Comment)   Aggravating Factors Comment when up and leg is hanging   Limiting Behavior Some   Relieving Factors Rest;Elevation   Result of Injury -        Pain Location: Foot      GEN:  Alert, well developed, well nourished, well appearing 45 y.o. female seated comfortably in no acute distress. Speech normal in context and clarity.     Psychiatric: Affect, mood and conduct are appropriate. Alert, oriented x 3 alert, oriented x 3, memory grossly intact, no dementia  Patient arrives to office via: with assistive device: crutches   Patient accompanied in the examination room by: spouse    M/S EXAMINATION OF: left foot  DRESSINGS: CDI other than mild soilage on dressing   DRAINAGE: none  INCISION: Incision looks good, skin well approximated, no dehiscence, nylon sutures in place without disruption. SKIN: mild edema , no erythema, mild  ecchymosis, no warmth    TENDERNESS:  mild tenderness to palpation   NEUROVASCULAR:  grossly intact. Positive distal pulses and capillary refill. DVT ASSESSMENT:  The calf is not tender to palpation. No evidence of DVT seen on physical exam.  ROM: not tested                  RADIOGRAPHS & DIAGNOSTIC STUDIES       X-rays, 3 views of the left foot reveals post op changes s/p Right Lapidus, Modified Florence/akin. Overall alignment is acceptable. Hardware is in good position with no indication of movement or failure. Soft tissue swelling mildly present. Degenerative changes are noted. Calcified vessels are not present. IMPRESSION:     Encounter Diagnoses     ICD-10-CM ICD-9-CM   1. Post-operative pain G89.18 338.18   2. Osteoarthritis of left foot, unspecified osteoarthritis type M19.072 715.97   3. Bunion, right M21.611 727.1   4. Post-operative state Z98.890 V45.89       PLAN:         Orders Placed This Encounter    [06609] Foot Min 3V    HYDROcodone-acetaminophen (NORCO) 5-325 mg per tablet    aspirin (ASPIRIN) 325 mg tablet                    · Continue activity modification    · Mix Miralax in 8 oz of prune juice for constipation    · Weight bearing status:  no weight bearing to the surgical extremity    · No lifting, twisting, squatting, deep bending, driving or strenuous activity. · Please follow up in 2 weeks     · Start taking Aspirin as directed      · Prescription for Brad Mack has been provided.  Please take medication as directed    · Follow RICE protocol (protecting skin)    · Maintain proper Nutrition    · Take a multivitamin, calcium + Vitamin D supplement daily (if tolerated)    · If you are a current smoker, quit or limit smoking    · Keep the current dressings on and in place. You need to keep this incision clean and dry. If you have a splint or cast on please keep this clean and dry as well. · You should expect swelling and bruising in the area over the next several days. You may elevate the surgical extremity on 1 pillow. Place the pillow horizontal so that no pressure is on the back of your heel. You may apply an icepack on top of the dressing to help minimize the swelling. PLEASE SEEK IMMEDIATE ASSESSMENT BY ER PHYSICIAN IF ANY OF THE FOLLOWING EXIST:      Excessive pain, swelling, redness or odor of or around the surgical area    Temperature over 100.5    Nausea and vomiting lasting longer than 4 hours or if unable to take medications    Any signs of decreased circulation or nerve impairment to extremity: change in color, persistent numbness, tingling, coldness or increase pain    If any calf pain, calf tightness, shortness of breath, chest pain    Any difficulty breathing at rest or with ambulation, any chest tightness/soreness   Severe intractable pain, persistent swelling or drainage, development of a wound, incisional redness, finger/toe swelling or color changes, or CALF PAIN    · Perform ankle pumps with non-surgical/non-injured extremity to help reduce the risk of blood clots    · Keep all pets away from  any wound present in order to prevent infection. Jones Rivera presents today for post operative follow up and pain that is secondary to post operative state. Since surgery, the patient's pain has not changed. Patient describes the pain as aching, pulsating, throbbing. Since surgery, the patient is not able to perform normal daily activities.   Patient reports the following adverse side effects from treatment: constipation. Today - Pain Scale: 6/10    Prior records: N/A - post op  Personal or family history of psychiatric, addiction, or substance abuse: no    Aberrant behaviors: None.  reviewed: n/a. Post Op       Patient has been discussed with Dr. Keila Zaman during this visit and he agrees with the assessment and plan    Patient expresses understanding of the plan. Patient education provided on post surgical care. REVIEW OF SYSTEMS:     Review of Systems: Chest pain: no  Shortness of breath: no  Fever: no  Night sweats: no  Weight loss: no  Constitutional signs: no    Review of all other systems is negative    Otherwise as noted in HPI      PAST MEDICAL HISTORY:     Past Medical History:   Diagnosis Date    ADHD     Anxiety     Body piercing     Navel    IBS (irritable bowel syndrome)     IUD (intrauterine device) in place     Sleep disorder        MEDICATIONS:     Current Outpatient Medications   Medication Sig    HYDROcodone-acetaminophen (NORCO) 5-325 mg per tablet Take 1 Tab by mouth every four (4) hours as needed for Pain for up to 7 days. Max Daily Amount: 6 Tabs. FOR **POST OPERATIVE PAIN    aspirin (ASPIRIN) 325 mg tablet Take 1 Tab by mouth daily.  cephALEXin (KEFLEX) 250 mg capsule Take 2 Caps by mouth four (4) times daily for 5 days.  valACYclovir (VALTREX) 500 mg tablet Take 500 mg by mouth daily.  promethazine (PHENERGAN) 25 mg tablet Take 1 Tab by mouth every six (6) hours as needed for Nausea.  polyethylene glycol (MIRALAX) 17 gram packet Take 1 Packet by mouth daily.  buPROPion SR (WELLBUTRIN SR) 150 mg SR tablet Take  by mouth daily.  Lisdexamfetamine (VYVANSE) 70 mg cap Take  by mouth daily.  lamoTRIgine (LAMICTAL) 100 mg tablet Take 200 mg by mouth two (2) times a day.  lurasidone (LATUDA) 20 mg tab tablet Take 40 mg by mouth daily (with dinner).     LORazepam (ATIVAN) 2 mg tablet Take  by mouth every six (6) hours as needed for Anxiety. No current facility-administered medications for this visit. ALLERGIES:     No Known Allergies      PAST SURGICAL HISTORY:     Past Surgical History:   Procedure Laterality Date    HX  SECTION      HX CHOLECYSTECTOMY      HX CYST REMOVAL         SOCIAL HISTORY:     Social History     Socioeconomic History    Marital status: UNKNOWN     Spouse name: Not on file    Number of children: Not on file    Years of education: Not on file    Highest education level: Not on file   Occupational History    Not on file   Social Needs    Financial resource strain: Not on file    Food insecurity:     Worry: Not on file     Inability: Not on file    Transportation needs:     Medical: Not on file     Non-medical: Not on file   Tobacco Use    Smoking status: Current Some Day Smoker    Smokeless tobacco: Never Used    Tobacco comment: 3 cigs per day   Substance and Sexual Activity    Alcohol use:  Yes     Alcohol/week: 2.0 standard drinks     Types: 2 Glasses of wine per week     Frequency: Never     Comment: weekends    Drug use: Never    Sexual activity: Not on file   Lifestyle    Physical activity:     Days per week: Not on file     Minutes per session: Not on file    Stress: Not on file   Relationships    Social connections:     Talks on phone: Not on file     Gets together: Not on file     Attends [employer] service: Not on file     Active member of club or organization: Not on file     Attends meetings of clubs or organizations: Not on file     Relationship status: Not on file    Intimate partner violence:     Fear of current or ex partner: Not on file     Emotionally abused: Not on file     Physically abused: Not on file     Forced sexual activity: Not on file   Other Topics Concern    Not on file   Social History Narrative    Not on file       FAMILY HISTORY:     Family History   Problem Relation Age of Onset    Hypertension Mother     Hypertension Father            Lenore Tomlinson PA-C  9/18/2019

## 2019-09-18 NOTE — PATIENT INSTRUCTIONS
· Continue activity modification    · Mix Miralax in 8 oz of prune juice for constipation    · Weight bearing status:  no weight bearing to the surgical extremity    · No lifting, twisting, squatting, deep bending, driving or strenuous activity. · Please follow up in 2 weeks     · Start taking Aspirin as directed      · Prescription for Terri Ortiz has been provided. Please take medication as directed    · Follow RICE protocol (protecting skin)    · Maintain proper Nutrition    · Take a multivitamin, calcium + Vitamin D supplement daily (if tolerated)    · If you are a current smoker, quit or limit smoking    · Keep the current dressings on and in place. You need to keep this incision clean and dry. If you have a splint or cast on please keep this clean and dry as well. · You should expect swelling and bruising in the area over the next several days. You may elevate the surgical extremity on 1 pillow. Place the pillow horizontal so that no pressure is on the back of your heel. You may apply an icepack on top of the dressing to help minimize the swelling.      PLEASE SEEK IMMEDIATE ASSESSMENT BY ER PHYSICIAN IF ANY OF THE FOLLOWING EXIST:      Excessive pain, swelling, redness or odor of or around the surgical area    Temperature over 100.5    Nausea and vomiting lasting longer than 4 hours or if unable to take medications    Any signs of decreased circulation or nerve impairment to extremity: change in color, persistent numbness, tingling, coldness or increase pain    If any calf pain, calf tightness, shortness of breath, chest pain    Any difficulty breathing at rest or with ambulation, any chest tightness/soreness   Severe intractable pain, persistent swelling or drainage, development of a wound, incisional redness, finger/toe swelling or color changes, or CALF PAIN    · Perform ankle pumps with non-surgical/non-injured extremity to help reduce the risk of blood clots    · Keep all pets away from  any wound present in order to prevent infection. Acute Pain After Surgery: Care Instructions  Your Care Instructions    It's common to have some pain after surgery. Pain doesn't mean that something is wrong or that the surgery didn't go well. But when the pain is severe, it's important to work with your doctor to manage it. It's also important to be aware of a few facts about pain and pain medicine. · You are the only person who knows what your pain feels like. So be sure to tell your doctor when you are in pain or when the pain changes. Then he or she will know how to adjust your medicines. · Pain is often easier to control right after it starts. So it may be better to take regular doses of pain medicine and not wait until the pain gets bad. · Medicine can help control pain. But this doesn't mean you'll have no pain. Medicine works to keep the pain at a level you can live with. With time, you will feel better. Follow-up care is a key part of your treatment and safety. Be sure to make and go to all appointments, and call your doctor if you are having problems. It's also a good idea to know your test results and keep a list of the medicines you take. How can you care for yourself at home? · Be safe with medicines. Read and follow all instructions on the label. ? If the doctor gave you a prescription medicine for pain, take it as prescribed. ? If you are not taking a prescription pain medicine, ask your doctor if you can take an over-the-counter medicine. · If you take an over-the-counter pain medicine, such as acetaminophen (Tylenol), ibuprofen (Advil, Motrin), or naproxen (Aleve), read and follow all instructions on the label. · Do not take two or more pain medicines at the same time unless the doctor told you to. · Do not drink alcohol while you are taking pain medicines. · Try to walk each day if your doctor recommends it. Start by walking a little more than you did the day before.  Bit by bit, increase the amount you walk. Walking increases blood flow. It also helps prevent pneumonia and constipation. · To prevent constipation from opioid pain medicines:  ? Talk to your doctor about a laxative. ? Include fruits, vegetables, beans, and whole grains in your diet each day. These foods are high in fiber. ? Drink plenty of fluids, enough so that your urine is light yellow or clear like water. Drink water, fruit juice, or other drinks that do not contain caffeine or alcohol. If you have kidney, heart, or liver disease and have to limit fluids, talk with your doctor before you increase the amount of fluids you drink. ? Take a fiber supplement, such as Citrucel or Metamucil, every day if needed. Read and follow all instructions on the label. If you take pain medicine for more than a few days, talk to your doctor before you take fiber. When should you call for help? Call your doctor now or seek immediate medical care if:    · Your pain gets worse. · Your pain is not controlled by medicine. Watch closely for changes in your health, and be sure to contact your doctor if you have any problems. Where can you learn more? Go to http://scott-heather.info/. Enter ([phone] 310-684 in the search box to learn more about \"Acute Pain After Surgery: Care Instructions. \"  Current as of: September 23, 2018  Content Version: 11.9  © 6464-5051 [employer]. Care instructions adapted under license by [employer] (which disclaims liability or warranty for this information). If you have questions about a medical condition or this instruction, always ask your healthcare professional. [place] [address] any warranty or liability for your use of this information.

## 2019-10-02 ENCOUNTER — OFFICE VISIT (OUTPATIENT)
Dept: ORTHOPEDIC SURGERY | Age: 44
End: 2019-10-02

## 2019-10-02 VITALS
WEIGHT: 182 LBS | DIASTOLIC BLOOD PRESSURE: 85 MMHG | BODY MASS INDEX: 26.05 KG/M2 | HEIGHT: 70 IN | HEART RATE: 107 BPM | TEMPERATURE: 98.4 F | RESPIRATION RATE: 16 BRPM | OXYGEN SATURATION: 100 % | SYSTOLIC BLOOD PRESSURE: 127 MMHG

## 2019-10-02 DIAGNOSIS — M21.611 BUNION, RIGHT: ICD-10-CM

## 2019-10-02 DIAGNOSIS — Z98.890 POST-OPERATIVE STATE: ICD-10-CM

## 2019-10-02 DIAGNOSIS — M19.072 OSTEOARTHRITIS OF LEFT FOOT, UNSPECIFIED OSTEOARTHRITIS TYPE: Primary | ICD-10-CM

## 2019-10-02 RX ORDER — HYDROCODONE BITARTRATE AND ACETAMINOPHEN 5; 325 MG/1; MG/1
1 TABLET ORAL
Qty: 42 TAB | Refills: 0 | Status: SHIPPED | OUTPATIENT
Start: 2019-10-02 | End: 2019-10-09 | Stop reason: SDUPTHER

## 2019-10-02 NOTE — PROGRESS NOTES
Patient: Stevan Piña                MRN: [id number]       SSN: [id number]  YOB: 1975              AGE: 45 y.o. SEX: female    PCP:Unknown, Provider      Chief Complaint:   Chief Complaint   Patient presents with    Foot Pain     right foot pain       HPI:     The patient is a 45 y.o. female who presents today for follow up 19 days s/p:     Right [place]us, Modified [place]/akin Procedure/c-arm/lapiplasty Trace Ii/acumed/orthofix Bone Stimulator/nerve Block - Right. DOS: 9/13/19    Patient has been NWB to the right lower extremity. Patient reports doing well other than post op pain that is better controlled with Norco 5/325. She states that her constipation resolved with the Miralax mixed in prune juice. Patient denies any fever, chills, chest pain, shortness of breath or calf pain. There are no new illness or injuries to report since last seen in the office. Patient is on ASA medication for DVT prophylaxis. She has been using the bone stimulator. She continues working from home. PHYSICAL EXAM:     Visit Vitals  /85   Pulse (!) 107   Temp 98.4 °F (36.9 °C) (Oral)   Resp 16   Ht 5' 10\" (1.778 m)   Wt 182 lb (82.6 kg)   SpO2 100%   BMI 26.11 kg/m²         Pain Scale: /10    Pain Assessment  10/2/2019   Location of Pain Foot   Pain Location Comment -   Location Modifiers Right   Severity of Pain 3   Quality of Pain Throbbing   Duration of Pain A few hours   Frequency of Pain Intermittent   Aggravating Factors Standing;Walking   Aggravating Factors Comment -   Limiting Behavior -   Relieving Factors Elevation   Result of Injury No        Pain Location:       GEN:  Alert, well developed, well nourished, well appearing 45 y.o. female seated comfortably in no acute distress. Speech normal in context and clarity. Psychiatric: Affect, mood and conduct are appropriate.  Alert, oriented x 3 alert, oriented x 3, memory grossly intact, no dementia  Patient arrives to office via: with assistive device: crutches   Patient accompanied in the examination room by: spouse    M/S EXAMINATION OF: left foot  DRESSINGS: CDI other than mild soilage on dressing   DRAINAGE: none  INCISION: Incision looks good, skin well approximated, no dehiscence, nylon sutures in place without disruption. SKIN: mild edema , no erythema, mild  ecchymosis, no warmth    TENDERNESS:  mild tenderness to palpation   NEUROVASCULAR:  grossly intact. Positive distal pulses and capillary refill. DVT ASSESSMENT:  The calf is not tender to palpation. No evidence of DVT seen on physical exam.  ROM: not tested                  RADIOGRAPHS & DIAGNOSTIC STUDIES       None    IMPRESSION:     Encounter Diagnoses     ICD-10-CM ICD-9-CM   1. Osteoarthritis of left foot, unspecified osteoarthritis type M19.072 715.97   2. Bunion, right M21.611 727.1   3. Post-operative state Z98.890 V45.89       PLAN:         Orders Placed This Encounter    HYDROcodone-acetaminophen (NORCO) 5-325 mg per tablet                    · Continue activity modification    · Mix Miralax in 8 oz of prune juice for constipation    · Weight bearing status:  no weight bearing to the surgical extremity    · No lifting, twisting, squatting, deep bending, driving or strenuous activity. · Please follow up in 1 week for suture removal    · Start taking Aspirin as directed      · Follow RICE protocol (protecting skin)    · Maintain proper Nutrition    · Take a multivitamin, calcium + Vitamin D supplement daily (if tolerated)    · If you are a current smoker, quit or limit smoking    · Keep the current dressings on and in place. You need to keep this incision clean and dry. If you have a splint or cast on please keep this clean and dry as well. · You should expect swelling and bruising in the area over the next several days. You may elevate the surgical extremity on 1 pillow. Place the pillow horizontal so that no pressure is on the back of your heel.  You may apply an icepack on top of the dressing to help minimize the swelling. PLEASE SEEK IMMEDIATE ASSESSMENT BY ER PHYSICIAN IF ANY OF THE FOLLOWING EXIST:      Excessive pain, swelling, redness or odor of or around the surgical area    Temperature over 100.5    Nausea and vomiting lasting longer than 4 hours or if unable to take medications    Any signs of decreased circulation or nerve impairment to extremity: change in color, persistent numbness, tingling, coldness or increase pain    If any calf pain, calf tightness, shortness of breath, chest pain    Any difficulty breathing at rest or with ambulation, any chest tightness/soreness   Severe intractable pain, persistent swelling or drainage, development of a wound, incisional redness, finger/toe swelling or color changes, or CALF PAIN    · Perform ankle pumps with non-surgical/non-injured extremity to help reduce the risk of blood clots    · Keep all pets away from  any wound present in order to prevent infection. Jessica Rubin presents today for post operative follow up and pain that is secondary to post operative state. Since surgery, the patient's pain has not changed. Patient describes the pain as aching, pulsating, throbbing. Since surgery, the patient is not able to perform normal daily activities. Patient reports the following adverse side effects from treatment: constipation. Today - Pain Scale: /10    Prior records: N/A - post op  Personal or family history of psychiatric, addiction, or substance abuse: no    Aberrant behaviors: None.  reviewed: n/a. Post Op       Patient has been discussed with Dr. Wilfrido Varner during this visit and he agrees with the assessment and plan    Patient expresses understanding of the plan. Patient education provided on post surgical care.       REVIEW OF SYSTEMS:     Review of Systems: Chest pain: no  Shortness of breath: no  Fever: no  Night sweats: no  Weight loss: no  Constitutional signs: no    Review of all other systems is negative    Otherwise as noted in HPI      PAST MEDICAL HISTORY:     Past Medical History:   Diagnosis Date    ADHD     Anxiety     Body piercing     Navel    IBS (irritable bowel syndrome)     IUD (intrauterine device) in place     Sleep disorder        MEDICATIONS:     Current Outpatient Medications   Medication Sig    HYDROcodone-acetaminophen (NORCO) 5-325 mg per tablet Take 1 Tab by mouth every four (4) hours as needed for Pain for up to 7 days. Max Daily Amount: 6 Tabs. FOR **POST OPERATIVE PAIN    aspirin (ASPIRIN) 325 mg tablet Take 1 Tab by mouth daily.  valACYclovir (VALTREX) 500 mg tablet Take 500 mg by mouth daily.  promethazine (PHENERGAN) 25 mg tablet Take 1 Tab by mouth every six (6) hours as needed for Nausea.  polyethylene glycol (MIRALAX) 17 gram packet Take 1 Packet by mouth daily.  buPROPion SR (WELLBUTRIN SR) 150 mg SR tablet Take  by mouth daily.  Lisdexamfetamine (VYVANSE) 70 mg cap Take  by mouth daily.  lamoTRIgine (LAMICTAL) 100 mg tablet Take 200 mg by mouth two (2) times a day.  lurasidone (LATUDA) 20 mg tab tablet Take 40 mg by mouth daily (with dinner).  LORazepam (ATIVAN) 2 mg tablet Take  by mouth every six (6) hours as needed for Anxiety. No current facility-administered medications for this visit.         ALLERGIES:     No Known Allergies      PAST SURGICAL HISTORY:     Past Surgical History:   Procedure Laterality Date    HX  SECTION      HX CHOLECYSTECTOMY      HX CYST REMOVAL         SOCIAL HISTORY:     Social History     Socioeconomic History    Marital status: UNKNOWN     Spouse name: Not on file    Number of children: Not on file    Years of education: Not on file    Highest education level: Not on file   Occupational History    Not on file   Social Needs    Financial resource strain: Not on file    Food insecurity:     Worry: Not on file     Inability: Not on file    Transportation needs:     Medical: Not on file     Non-medical: Not on file   Tobacco Use    Smoking status: Current Some Day Smoker    Smokeless tobacco: Never Used    Tobacco comment: 3 cigs per day   Substance and Sexual Activity    Alcohol use:  Yes     Alcohol/week: 2.0 standard drinks     Types: 2 Glasses of wine per week     Frequency: Never     Comment: weekends    Drug use: Never    Sexual activity: Not on file   Lifestyle    Physical activity:     Days per week: Not on file     Minutes per session: Not on file    Stress: Not on file   Relationships    Social connections:     Talks on phone: Not on file     Gets together: Not on file     Attends Mandaeism service: Not on file     Active member of club or organization: Not on file     Attends meetings of clubs or organizations: Not on file     Relationship status: Not on file    Intimate partner violence:     Fear of current or ex partner: Not on file     Emotionally abused: Not on file     Physically abused: Not on file     Forced sexual activity: Not on file   Other Topics Concern    Not on file   Social History Narrative    Not on file       FAMILY HISTORY:     Family History   Problem Relation Age of Onset    Hypertension Mother     Hypertension Father            Tory West PA-C  10/2/2019

## 2019-10-02 NOTE — PATIENT INSTRUCTIONS
· Continue activity modification    · Mix Miralax in 8 oz of prune juice for constipation    · Weight bearing status:  no weight bearing to the surgical extremity    · No lifting, twisting, squatting, deep bending, driving or strenuous activity. · Please follow up in 1 week    · Start taking Aspirin as directed      · Prescription for Miguelina Masters has been provided. Please take medication as directed    · Follow RICE protocol (protecting skin)    · Maintain proper Nutrition    · Take a multivitamin, calcium + Vitamin D supplement daily (if tolerated)    · If you are a current smoker, quit or limit smoking    · Keep the current dressings on and in place. You need to keep this incision clean and dry. If you have a splint or cast on please keep this clean and dry as well. · You should expect swelling and bruising in the area over the next several days. You may elevate the surgical extremity on 1 pillow. Place the pillow horizontal so that no pressure is on the back of your heel. You may apply an icepack on top of the dressing to help minimize the swelling.      PLEASE SEEK IMMEDIATE ASSESSMENT BY ER PHYSICIAN IF ANY OF THE FOLLOWING EXIST:      Excessive pain, swelling, redness or odor of or around the surgical area    Temperature over 100.5    Nausea and vomiting lasting longer than 4 hours or if unable to take medications    Any signs of decreased circulation or nerve impairment to extremity: change in color, persistent numbness, tingling, coldness or increase pain    If any calf pain, calf tightness, shortness of breath, chest pain    Any difficulty breathing at rest or with ambulation, any chest tightness/soreness   Severe intractable pain, persistent swelling or drainage, development of a wound, incisional redness, finger/toe swelling or color changes, or CALF PAIN    · Perform ankle pumps with non-surgical/non-injured extremity to help reduce the risk of blood clots    · Keep all pets away from  any wound present in order to prevent infection. Acute Pain After Surgery: Care Instructions  Your Care Instructions    It's common to have some pain after surgery. Pain doesn't mean that something is wrong or that the surgery didn't go well. But when the pain is severe, it's important to work with your doctor to manage it. It's also important to be aware of a few facts about pain and pain medicine. · You are the only person who knows what your pain feels like. So be sure to tell your doctor when you are in pain or when the pain changes. Then he or she will know how to adjust your medicines. · Pain is often easier to control right after it starts. So it may be better to take regular doses of pain medicine and not wait until the pain gets bad. · Medicine can help control pain. But this doesn't mean you'll have no pain. Medicine works to keep the pain at a level you can live with. With time, you will feel better. Follow-up care is a key part of your treatment and safety. Be sure to make and go to all appointments, and call your doctor if you are having problems. It's also a good idea to know your test results and keep a list of the medicines you take. How can you care for yourself at home? · Be safe with medicines. Read and follow all instructions on the label. ? If the doctor gave you a prescription medicine for pain, take it as prescribed. ? If you are not taking a prescription pain medicine, ask your doctor if you can take an over-the-counter medicine. · If you take an over-the-counter pain medicine, such as acetaminophen (Tylenol), ibuprofen (Advil, Motrin), or naproxen (Aleve), read and follow all instructions on the label. · Do not take two or more pain medicines at the same time unless the doctor told you to. · Do not drink alcohol while you are taking pain medicines. · Try to walk each day if your doctor recommends it. Start by walking a little more than you did the day before.  Bit by bit, increase the amount you walk. Walking increases blood flow. It also helps prevent pneumonia and constipation. · To prevent constipation from opioid pain medicines:  ? Talk to your doctor about a laxative. ? Include fruits, vegetables, beans, and whole grains in your diet each day. These foods are high in fiber. ? Drink plenty of fluids, enough so that your urine is light yellow or clear like water. Drink water, fruit juice, or other drinks that do not contain caffeine or alcohol. If you have kidney, heart, or liver disease and have to limit fluids, talk with your doctor before you increase the amount of fluids you drink. ? Take a fiber supplement, such as Citrucel or Metamucil, every day if needed. Read and follow all instructions on the label. If you take pain medicine for more than a few days, talk to your doctor before you take fiber. When should you call for help? Call your doctor now or seek immediate medical care if:    · Your pain gets worse. · Your pain is not controlled by medicine. Watch closely for changes in your health, and be sure to contact your doctor if you have any problems. Where can you learn more? Go to http://scott-heather.info/. Enter ([phone] [phone] in the search box to learn more about \"Acute Pain After Surgery: Care Instructions. \"  Current as of: September 23, 2018  Content Version: 11.9  © 2648-4003 [employer]. Care instructions adapted under license by [employer] (which disclaims liability or warranty for this information). If you have questions about a medical condition or this instruction, always ask your healthcare professional. [place] [address] any warranty or liability for your use of this information.

## 2019-10-02 NOTE — PROGRESS NOTES
1. Have you been to the ER, urgent care clinic since your last visit? Hospitalized since your last visit? No    2. Have you seen or consulted any other health care providers outside of the [address] [place] [address] since your last visit? Include any pap smears or colon screening.  No

## 2019-10-09 ENCOUNTER — OFFICE VISIT (OUTPATIENT)
Dept: ORTHOPEDIC SURGERY | Age: 44
End: 2019-10-09

## 2019-10-09 VITALS
HEART RATE: 106 BPM | BODY MASS INDEX: 26.05 KG/M2 | DIASTOLIC BLOOD PRESSURE: 80 MMHG | RESPIRATION RATE: 14 BRPM | SYSTOLIC BLOOD PRESSURE: 125 MMHG | HEIGHT: 70 IN | OXYGEN SATURATION: 91 % | TEMPERATURE: 99 F | WEIGHT: 182 LBS

## 2019-10-09 DIAGNOSIS — M21.611 BUNION, RIGHT: Primary | ICD-10-CM

## 2019-10-09 DIAGNOSIS — M19.071 OSTEOARTHRITIS OF RIGHT FOOT, UNSPECIFIED OSTEOARTHRITIS TYPE: ICD-10-CM

## 2019-10-09 DIAGNOSIS — Z98.890 POST-OPERATIVE STATE: ICD-10-CM

## 2019-10-09 RX ORDER — HYDROCODONE BITARTRATE AND ACETAMINOPHEN 5; 325 MG/1; MG/1
1 TABLET ORAL
Qty: 42 TAB | Refills: 0 | Status: SHIPPED | OUTPATIENT
Start: 2019-10-09 | End: 2019-10-30 | Stop reason: SDUPTHER

## 2019-10-09 NOTE — PATIENT INSTRUCTIONS
· Continue activity modification    · Weight bearing status:  no weight bearing to the surgical extremity    · No lifting, twisting, squatting, deep bending, driving or strenuous activity. · Please follow up in 2 weeks    · Start taking Aspirin as directed      · Prescription for Julia Lizama has been provided. Please take medication as directed    · Follow RICE protocol (protecting skin)    · Maintain proper Nutrition    · Take a multivitamin, calcium + Vitamin D supplement daily (if tolerated)    · If you are a current smoker, quit or limit smoking    · Keep the current dressings on and in place. You need to keep this incision clean and dry. If you have a splint or cast on please keep this clean and dry as well. · You should expect swelling and bruising in the area over the next several days. You may elevate the surgical extremity on 1 pillow. Place the pillow horizontal so that no pressure is on the back of your heel. You may apply an icepack on top of the dressing to help minimize the swelling. PLEASE SEEK IMMEDIATE ASSESSMENT BY ER PHYSICIAN IF ANY OF THE FOLLOWING EXIST:      Excessive pain, swelling, redness or odor of or around the surgical area    Temperature over 100.5    Nausea and vomiting lasting longer than 4 hours or if unable to take medications    Any signs of decreased circulation or nerve impairment to extremity: change in color, persistent numbness, tingling, coldness or increase pain    If any calf pain, calf tightness, shortness of breath, chest pain    Any difficulty breathing at rest or with ambulation, any chest tightness/soreness   Severe intractable pain, persistent swelling or drainage, development of a wound, incisional redness, finger/toe swelling or color changes, or CALF PAIN    · Perform ankle pumps with non-surgical/non-injured extremity to help reduce the risk of blood clots    · Keep all pets away from  any wound present in order to prevent infection.

## 2019-10-09 NOTE — PROGRESS NOTES
1. Have you been to the ER, urgent care clinic since your last visit? Hospitalized since your last visit? No    2. Have you seen or consulted any other health care providers outside of the [address] [place] [address] since your last visit? Include any pap smears or colon screening.  No

## 2019-10-09 NOTE — PROGRESS NOTES
Patient: Eliz Gordillo                MRN: [id number]       SSN: [id number]  YOB: 1975              AGE: 45 y.o. SEX: female    PCP:Unknown, Provider      Chief Complaint:   Chief Complaint   Patient presents with    Foot Pain     right foot pain       HPI:     The patient is a 45 y.o. female who presents today for follow up 26 days s/p:     Right Lapidus, Modified Florence/akin Procedure/c-arm/lapiplasty Trace Ii/acumed/orthofix Bone Stimulator/nerve Block - Right. DOS: 9/13/19    Patient has been NWB to the right lower extremity. Patient reports doing well other than post op pain that is better controlled with Norco 5/325. She states that her constipation resolved with the Miralax mixed in prune juice. Patient denies any fever, chills, chest pain, shortness of breath or calf pain. There are no new illness or injuries to report since last seen in the office. Patient is on ASA for DVT prophylaxis. She has been using the bone stimulator. She continues working from home. PHYSICAL EXAM:     Visit Vitals  /80   Pulse (!) 106   Temp 99 °F (37.2 °C) (Oral)   Resp 14   Ht 5' 10\" (1.778 m)   Wt 182 lb (82.6 kg)   SpO2 91%   BMI 26.11 kg/m²         Pain Scale: /10    Pain Assessment  10/9/2019   Location of Pain Foot   Pain Location Comment -   Location Modifiers Right   Severity of Pain 1   Quality of Pain Aching   Duration of Pain A few hours   Frequency of Pain Intermittent   Aggravating Factors Walking;Standing   Aggravating Factors Comment -   Limiting Behavior -   Relieving Factors Elevation   Result of Injury No        Pain Location:       GEN:  Alert, well developed, well nourished, well appearing 45 y.o. female seated comfortably in no acute distress. Speech normal in context and clarity. Psychiatric: Affect, mood and conduct are appropriate.  Alert, oriented x 3 alert, oriented x 3, memory grossly intact, no dementia  Patient arrives to office via: with assistive device: crutches   Patient accompanied in the examination room by: spouse    M/S EXAMINATION OF: left foot  DRESSINGS: CDI other than mild soilage on dressing   DRAINAGE: none  INCISION: Incision looks good, skin well approximated, no dehiscence, nylon sutures in place without disruption. SKIN: mild edema , no erythema, mild  ecchymosis, no warmth    TENDERNESS:  mild tenderness to palpation   NEUROVASCULAR:  grossly intact. Positive distal pulses and capillary refill. DVT ASSESSMENT:  The calf is not tender to palpation. No evidence of DVT seen on physical exam.  ROM: not tested                  RADIOGRAPHS & DIAGNOSTIC STUDIES       None    IMPRESSION:     Encounter Diagnoses     ICD-10-CM ICD-9-CM   1. Bunion, right M21.611 727.1   2. Post-operative state Z98.890 V45.89   3. Osteoarthritis of right foot, unspecified osteoarthritis type M19.071 715.97       PLAN:         Orders Placed This Encounter    Generic Supply Order    HYDROcodone-acetaminophen (NORCO) 5-325 mg per tablet                · Sutures were removed in the office today. Patient placed in short CAM boot    · Continue activity modification    · Weight bearing status:  no weight bearing to the surgical extremity. Weight on heel only    · No lifting, twisting, squatting, deep bending, driving or strenuous activity. · Please follow up in 2 weeks - X-rays of the right foot at [address]    · Start taking Aspirin as directed      · Prescription for Nolibano Cotton has been provided. Please take medication as directed    · Follow RICE protocol (protecting skin)    · Maintain proper Nutrition    · Take a multivitamin, calcium + Vitamin D supplement daily (if tolerated)    · If you are a current smoker, quit or limit smoking    · Keep the current dressings on and in place. You need to keep this incision clean and dry. If you have a splint or cast on please keep this clean and dry as well.     · You should expect swelling and bruising in the area over the next several days. You may elevate the surgical extremity on 1 pillow. Place the pillow horizontal so that no pressure is on the back of your heel. You may apply an icepack on top of the dressing to help minimize the swelling. PLEASE SEEK IMMEDIATE ASSESSMENT BY ER PHYSICIAN IF ANY OF THE FOLLOWING EXIST:      Excessive pain, swelling, redness or odor of or around the surgical area    Temperature over 100.5    Nausea and vomiting lasting longer than 4 hours or if unable to take medications    Any signs of decreased circulation or nerve impairment to extremity: change in color, persistent numbness, tingling, coldness or increase pain    If any calf pain, calf tightness, shortness of breath, chest pain    Any difficulty breathing at rest or with ambulation, any chest tightness/soreness   Severe intractable pain, persistent swelling or drainage, development of a wound, incisional redness, finger/toe swelling or color changes, or CALF PAIN    · Perform ankle pumps with non-surgical/non-injured extremity to help reduce the risk of blood clots    · Keep all pets away from  any wound present in order to prevent infection. Thanh Lobo presents today for post operative follow up and pain that is secondary to post operative state. Since surgery, the patient's pain has not changed. Patient describes the pain as aching, pulsating, throbbing. Since surgery, the patient is not able to perform normal daily activities. Patient reports the following adverse side effects from treatment: constipation. Today - Pain Scale: /10    Prior records: N/A - post op  Personal or family history of psychiatric, addiction, or substance abuse: no    Aberrant behaviors: None.  reviewed: n/a. Post Op       Patient has been discussed with Dr. Betina Gardiner during this visit and he agrees with the assessment and plan    Patient expresses understanding of the plan.  Patient education provided on post surgical care.      REVIEW OF SYSTEMS:     Review of Systems: Chest pain: no  Shortness of breath: no  Fever: no  Night sweats: no  Weight loss: no  Constitutional signs: no    Review of all other systems is negative    Otherwise as noted in HPI      PAST MEDICAL HISTORY:     Past Medical History:   Diagnosis Date    ADHD     Anxiety     Body piercing     Navel    IBS (irritable bowel syndrome)     IUD (intrauterine device) in place     Sleep disorder        MEDICATIONS:     Current Outpatient Medications   Medication Sig    HYDROcodone-acetaminophen (NORCO) 5-325 mg per tablet Take 1 Tab by mouth every four (4) hours as needed for Pain for up to 7 days. Max Daily Amount: 6 Tabs. FOR **POST OPERATIVE PAIN    aspirin (ASPIRIN) 325 mg tablet Take 1 Tab by mouth daily.  valACYclovir (VALTREX) 500 mg tablet Take 500 mg by mouth daily.  promethazine (PHENERGAN) 25 mg tablet Take 1 Tab by mouth every six (6) hours as needed for Nausea.  polyethylene glycol (MIRALAX) 17 gram packet Take 1 Packet by mouth daily.  buPROPion SR (WELLBUTRIN SR) 150 mg SR tablet Take  by mouth daily.  Lisdexamfetamine (VYVANSE) 70 mg cap Take  by mouth daily.  lamoTRIgine (LAMICTAL) 100 mg tablet Take 200 mg by mouth two (2) times a day.  lurasidone (LATUDA) 20 mg tab tablet Take 40 mg by mouth daily (with dinner).  LORazepam (ATIVAN) 2 mg tablet Take  by mouth every six (6) hours as needed for Anxiety. No current facility-administered medications for this visit.         ALLERGIES:     No Known Allergies      PAST SURGICAL HISTORY:     Past Surgical History:   Procedure Laterality Date    HX  SECTION      HX CHOLECYSTECTOMY      HX CYST REMOVAL         SOCIAL HISTORY:     Social History     Socioeconomic History    Marital status: UNKNOWN     Spouse name: Not on file    Number of children: Not on file    Years of education: Not on file    Highest education level: Not on file   Occupational History    Not on file   Social Needs    Financial resource strain: Not on file    Food insecurity:     Worry: Not on file     Inability: Not on file    Transportation needs:     Medical: Not on file     Non-medical: Not on file   Tobacco Use    Smoking status: Current Some Day Smoker    Smokeless tobacco: Never Used    Tobacco comment: 3 cigs per day   Substance and Sexual Activity    Alcohol use:  Yes     Alcohol/week: 2.0 standard drinks     Types: 2 Glasses of wine per week     Frequency: Never     Comment: weekends    Drug use: Never    Sexual activity: Not on file   Lifestyle    Physical activity:     Days per week: Not on file     Minutes per session: Not on file    Stress: Not on file   Relationships    Social connections:     Talks on phone: Not on file     Gets together: Not on file     Attends [employer] service: Not on file     Active member of club or organization: Not on file     Attends meetings of clubs or organizations: Not on file     Relationship status: Not on file    Intimate partner violence:     Fear of current or ex partner: Not on file     Emotionally abused: Not on file     Physically abused: Not on file     Forced sexual activity: Not on file   Other Topics Concern    Not on file   Social History Narrative    Not on file       FAMILY HISTORY:     Family History   Problem Relation Age of Onset    Hypertension Mother     Hypertension Father            Juancho Blanco PA-C  10/9/2019

## 2019-10-23 ENCOUNTER — TELEPHONE (OUTPATIENT)
Dept: ORTHOPEDIC SURGERY | Age: 44
End: 2019-10-23

## 2019-10-23 NOTE — TELEPHONE ENCOUNTER
Patient had an appt today but rescheduled due to the wait time. She is asking if she can walk on her foot and if she is able to drive. Patient did reschedule for next Wednesday and can be reached at [phone].

## 2019-10-23 NOTE — TELEPHONE ENCOUNTER
Patient is to continue NWB (heel walking only) and not driving until follow up.         Arsenio Osgood, PA-C  10/23/2019  1:22 PM

## 2019-10-23 NOTE — TELEPHONE ENCOUNTER
[address] notifying patient that she may only put a little weight on the heel of affected extremity. Patient may not drive until follow up appointment.

## 2019-10-30 ENCOUNTER — OFFICE VISIT (OUTPATIENT)
Dept: ORTHOPEDIC SURGERY | Age: 44
End: 2019-10-30

## 2019-10-30 VITALS
HEART RATE: 107 BPM | OXYGEN SATURATION: 93 % | RESPIRATION RATE: 16 BRPM | TEMPERATURE: 98.9 F | SYSTOLIC BLOOD PRESSURE: 129 MMHG | HEIGHT: 70 IN | DIASTOLIC BLOOD PRESSURE: 87 MMHG | BODY MASS INDEX: 26.11 KG/M2

## 2019-10-30 DIAGNOSIS — M21.611 BUNION, RIGHT: Primary | ICD-10-CM

## 2019-10-30 DIAGNOSIS — M19.071 OSTEOARTHRITIS OF RIGHT FOOT, UNSPECIFIED OSTEOARTHRITIS TYPE: ICD-10-CM

## 2019-10-30 DIAGNOSIS — Z98.890 POST-OPERATIVE STATE: ICD-10-CM

## 2019-10-30 RX ORDER — HYDROCODONE BITARTRATE AND ACETAMINOPHEN 5; 325 MG/1; MG/1
1 TABLET ORAL
Qty: 28 TAB | Refills: 0 | Status: SHIPPED | OUTPATIENT
Start: 2019-10-30 | End: 2019-11-20 | Stop reason: SDUPTHER

## 2019-10-30 NOTE — PROGRESS NOTES
Patient: Isabel Wallace                MRN: [id number]       SSN: [id number]  YOB: 1975              AGE: 45 y.o. SEX: female    PCP:Unknown, Provider      Chief Complaint:   Chief Complaint   Patient presents with    Foot Pain     right foot pain       HPI:     The patient is a 45 y.o. female who presents today for follow up 47 days s/p:     Right Lapidus, Modified [place]/akin Procedure/c-arm/lapiplasty Trace Ii/acumed/orthofix Bone Stimulator/nerve Block - Right. DOS: 9/13/19    Patient has been PWB to the right lower extremity. Patient reports doing well other than post op pain. Patient denies any fever, chills, chest pain, shortness of breath or calf pain. There are no new illness to report since last seen in the office. Patient is on ASA for DVT prophylaxis. She states that she took a little tumble a few days ago and came down on her foot. She had increased pain that resolved. PHYSICAL EXAM:     Visit Vitals  /87   Pulse (!) 107   Temp 98.9 °F (37.2 °C) (Oral)   Resp 16   Ht 5' 10\" (1.778 m)   SpO2 93%   BMI 26.11 kg/m²         Pain Scale: /10    Pain Assessment  10/30/2019   Location of Pain Foot   Pain Location Comment -   Location Modifiers Right   Severity of Pain 2   Quality of Pain Sharp   Duration of Pain A few hours   Frequency of Pain Intermittent   Aggravating Factors -   Aggravating Factors Comment -   Limiting Behavior -   Relieving Factors Nothing   Result of Injury No        Pain Location:       GEN:  Alert, well developed, well nourished, well appearing 45 y.o. female seated comfortably in no acute distress. Speech normal in context and clarity. Psychiatric: Affect, mood and conduct are appropriate.  Alert, oriented x 3 alert, oriented x 3, memory grossly intact, no dementia  Patient arrives to office via: with assistive device: crutches   Patient accompanied in the examination room by: spouse    M/S EXAMINATION OF: left foot  DRESSINGS: CDI other than mild soilage on dressing   DRAINAGE: none  INCISION: Incision looks good, skin well healed  SKIN: mild edema , no erythema, mild  ecchymosis, no warmth, dry    TENDERNESS:  mild tenderness to palpation   NEUROVASCULAR:  grossly intact. Positive distal pulses and capillary refill. DVT ASSESSMENT:  The calf is not tender to palpation. No evidence of DVT seen on physical exam.  ROM: not tested                  RADIOGRAPHS & DIAGNOSTIC STUDIES     X-rays, 3 views of the left foot reveals post op changes s/p Right Lapidus, Modified Florence/akin. Overall alignment is acceptable. Hardware is in good position with no indication of movement or failure. Soft tissue swelling mildly present. Degenerative changes are noted. Calcified vessels are not present. IMPRESSION:     Encounter Diagnoses     ICD-10-CM ICD-9-CM   1. Bunion, right M21.611 727.1   2. Post-operative state Z98.890 V45.89   3. Osteoarthritis of right foot, unspecified osteoarthritis type M19.071 715.97       PLAN:         No orders of the defined types were placed in this encounter. · Continue activity modification    · Weight bearing status:  weight bearing to the surgical extremity in CAM boot    · You may shower, no submerging in any water. Apply a gentle moisturizer    · No lifting, twisting, squatting, deep bending, driving or strenuous activity. · Please follow up in 3 weeks    · Start taking Aspirin as directed      · Prescription for Mckinley Wellington has been provided. Please take medication as directed    · Follow RICE protocol (protecting skin)    · Maintain proper Nutrition    · Take a multivitamin, calcium + Vitamin D supplement daily (if tolerated)    · If you are a current smoker, quit or limit smoking    · Keep the current dressings on and in place. You need to keep this incision clean and dry. If you have a splint or cast on please keep this clean and dry as well.     · You should expect swelling and bruising in the area over the next several days. You may elevate the surgical extremity on 1 pillow. Place the pillow horizontal so that no pressure is on the back of your heel. You may apply an icepack on top of the dressing to help minimize the swelling. PLEASE SEEK IMMEDIATE ASSESSMENT BY ER PHYSICIAN IF ANY OF THE FOLLOWING EXIST:      Excessive pain, swelling, redness or odor of or around the surgical area    Temperature over 100.5    Nausea and vomiting lasting longer than 4 hours or if unable to take medications    Any signs of decreased circulation or nerve impairment to extremity: change in color, persistent numbness, tingling, coldness or increase pain    If any calf pain, calf tightness, shortness of breath, chest pain    Any difficulty breathing at rest or with ambulation, any chest tightness/soreness   Severe intractable pain, persistent swelling or drainage, development of a wound, incisional redness, finger/toe swelling or color changes, or CALF PAIN    · Perform ankle pumps with non-surgical/non-injured extremity to help reduce the risk of blood clots    · Keep all pets away from  any wound present in order to prevent infection. Justine Nichols presents today for post operative follow up and pain that is secondary to post operative state. Since surgery, the patient's pain has not changed. Patient describes the pain as aching, pulsating, throbbing. Since surgery, the patient is not able to perform normal daily activities. Patient reports the following adverse side effects from treatment: constipation. Today - Pain Scale: /10    Prior records: N/A - post op  Personal or family history of psychiatric, addiction, or substance abuse: no    Aberrant behaviors: None.  reviewed: n/a. Post Op       Patient has been discussed with Dr. Ambar Calero during this visit and he agrees with the assessment and plan    Patient expresses understanding of the plan.  Patient education provided on post surgical care.      REVIEW OF SYSTEMS:     Review of Systems: Chest pain: no  Shortness of breath: no  Fever: no  Night sweats: no  Weight loss: no  Constitutional signs: no    Review of all other systems is negative    Otherwise as noted in HPI      PAST MEDICAL HISTORY:     Past Medical History:   Diagnosis Date    ADHD     Anxiety     Body piercing     Navel    IBS (irritable bowel syndrome)     IUD (intrauterine device) in place     Sleep disorder        MEDICATIONS:     Current Outpatient Medications   Medication Sig    aspirin (ASPIRIN) 325 mg tablet Take 1 Tab by mouth daily.  valACYclovir (VALTREX) 500 mg tablet Take 500 mg by mouth daily.  promethazine (PHENERGAN) 25 mg tablet Take 1 Tab by mouth every six (6) hours as needed for Nausea.  polyethylene glycol (MIRALAX) 17 gram packet Take 1 Packet by mouth daily.  buPROPion SR (WELLBUTRIN SR) 150 mg SR tablet Take  by mouth daily.  Lisdexamfetamine (VYVANSE) 70 mg cap Take  by mouth daily.  lamoTRIgine (LAMICTAL) 100 mg tablet Take 200 mg by mouth two (2) times a day.  lurasidone (LATUDA) 20 mg tab tablet Take 40 mg by mouth daily (with dinner).  LORazepam (ATIVAN) 2 mg tablet Take  by mouth every six (6) hours as needed for Anxiety. No current facility-administered medications for this visit.         ALLERGIES:     No Known Allergies      PAST SURGICAL HISTORY:     Past Surgical History:   Procedure Laterality Date    HX  SECTION      HX CHOLECYSTECTOMY      HX CYST REMOVAL         SOCIAL HISTORY:     Social History     Socioeconomic History    Marital status: UNKNOWN     Spouse name: Not on file    Number of children: Not on file    Years of education: Not on file    Highest education level: Not on file   Occupational History    Not on file   Social Needs    Financial resource strain: Not on file    Food insecurity:     Worry: Not on file     Inability: Not on file    Transportation needs:     Medical: Not on file     Non-medical: Not on file   Tobacco Use    Smoking status: Current Some Day Smoker    Smokeless tobacco: Never Used    Tobacco comment: 3 cigs per day   Substance and Sexual Activity    Alcohol use:  Yes     Alcohol/week: 2.0 standard drinks     Types: 2 Glasses of wine per week     Frequency: Never     Comment: weekends    Drug use: Never    Sexual activity: Not on file   Lifestyle    Physical activity:     Days per week: Not on file     Minutes per session: Not on file    Stress: Not on file   Relationships    Social connections:     Talks on phone: Not on file     Gets together: Not on file     Attends Worship service: Not on file     Active member of club or organization: Not on file     Attends meetings of clubs or organizations: Not on file     Relationship status: Not on file    Intimate partner violence:     Fear of current or ex partner: Not on file     Emotionally abused: Not on file     Physically abused: Not on file     Forced sexual activity: Not on file   Other Topics Concern    Not on file   Social History Narrative    Not on file       FAMILY HISTORY:     Family History   Problem Relation Age of Onset    Hypertension Mother     Hypertension Father            Colt Nichole PA-C  10/30/2019

## 2019-10-30 NOTE — PROGRESS NOTES
1. Have you been to the ER, urgent care clinic since your last visit? Hospitalized since your last visit? No    2. Have you seen or consulted any other health care providers outside of the [address] [place] [address] since your last visit? Include any pap smears or colon screening.  No

## 2019-10-30 NOTE — PATIENT INSTRUCTIONS
· Continue activity modification    · Weight bearing status:  weight bearing to the surgical extremity in CAM boot    · You may shower, no submerging in any water. Apply a gentle moisturizer    · No lifting, twisting, squatting, deep bending, driving or strenuous activity. · Please follow up in 3 weeks    · Start taking Aspirin as directed      · Prescription for Diana Dibbles has been provided. Please take medication as directed    · Follow RICE protocol (protecting skin)    · Maintain proper Nutrition    · Take a multivitamin, calcium + Vitamin D supplement daily (if tolerated)    · If you are a current smoker, quit or limit smoking    · Keep the current dressings on and in place. You need to keep this incision clean and dry. If you have a splint or cast on please keep this clean and dry as well. · You should expect swelling and bruising in the area over the next several days. You may elevate the surgical extremity on 1 pillow. Place the pillow horizontal so that no pressure is on the back of your heel. You may apply an icepack on top of the dressing to help minimize the swelling.      PLEASE SEEK IMMEDIATE ASSESSMENT BY ER PHYSICIAN IF ANY OF THE FOLLOWING EXIST:      Excessive pain, swelling, redness or odor of or around the surgical area    Temperature over 100.5    Nausea and vomiting lasting longer than 4 hours or if unable to take medications    Any signs of decreased circulation or nerve impairment to extremity: change in color, persistent numbness, tingling, coldness or increase pain    If any calf pain, calf tightness, shortness of breath, chest pain    Any difficulty breathing at rest or with ambulation, any chest tightness/soreness   Severe intractable pain, persistent swelling or drainage, development of a wound, incisional redness, finger/toe swelling or color changes, or CALF PAIN    · Perform ankle pumps with non-surgical/non-injured extremity to help reduce the risk of blood clots    · Keep all pets away from  any wound present in order to prevent infection.

## 2019-11-20 ENCOUNTER — OFFICE VISIT (OUTPATIENT)
Dept: ORTHOPEDIC SURGERY | Age: 44
End: 2019-11-20

## 2019-11-20 VITALS
SYSTOLIC BLOOD PRESSURE: 119 MMHG | HEART RATE: 101 BPM | DIASTOLIC BLOOD PRESSURE: 84 MMHG | BODY MASS INDEX: 26.05 KG/M2 | OXYGEN SATURATION: 98 % | TEMPERATURE: 97.3 F | WEIGHT: 182 LBS | HEIGHT: 70 IN | RESPIRATION RATE: 16 BRPM

## 2019-11-20 DIAGNOSIS — M19.071 OSTEOARTHRITIS OF RIGHT FOOT, UNSPECIFIED OSTEOARTHRITIS TYPE: ICD-10-CM

## 2019-11-20 DIAGNOSIS — M77.41 METATARSALGIA OF RIGHT FOOT: ICD-10-CM

## 2019-11-20 DIAGNOSIS — M79.671 RIGHT FOOT PAIN: Primary | ICD-10-CM

## 2019-11-20 DIAGNOSIS — M21.611 BUNION, RIGHT: ICD-10-CM

## 2019-11-20 DIAGNOSIS — Z98.890 POST-OPERATIVE STATE: ICD-10-CM

## 2019-11-20 RX ORDER — PROMETHAZINE HYDROCHLORIDE 25 MG/1
25 TABLET ORAL
Qty: 30 TAB | Refills: 0 | Status: SHIPPED | OUTPATIENT
Start: 2019-11-20

## 2019-11-20 RX ORDER — HYDROCODONE BITARTRATE AND ACETAMINOPHEN 5; 325 MG/1; MG/1
1 TABLET ORAL
Qty: 28 TAB | Refills: 0 | Status: SHIPPED | OUTPATIENT
Start: 2019-11-20 | End: 2019-11-27

## 2019-11-20 NOTE — PATIENT INSTRUCTIONS
· Continue activity modification · Weight bearing status:  weight bearing to the surgical extremity in CAM boot or post op shoe. You may wean to a supportive shoe · Use Tubigrip for swelling · Please follow up in 4 weeks · Prescription for Diana Dibbles has been provided for severe pain

## 2019-11-20 NOTE — PROGRESS NOTES
1. Have you been to the ER, urgent care clinic since your last visit? Hospitalized since your last visit? No    2. Have you seen or consulted any other health care providers outside of the [address] [place] [address] since your last visit? Include any pap smears or colon screening.  No

## 2019-11-20 NOTE — PROGRESS NOTES
Patient: Rodney Alfred                MRN: [id number]       SSN: [id number]  YOB: 1975              AGE: 45 y.o. SEX: female    PCP:Unknown, Provider      Chief Complaint:   Chief Complaint   Patient presents with    Foot Pain     right foot       HPI:     The patient is a 45 y.o. female who presents today for follow up 68 days s/p:     Right Lapidus, Modified Florence/akin Procedure/c-arm/lapiplasty Trace Ii/acumed/orthofix Bone Stimulator/nerve Block - Right. DOS: 9/13/19    Patient has been WBAT to the right lower extremity in the CAM boot. Patient reports doing well other than post op pain to the ball of her foot with WB. She states that she had to do an [place] and was on her feet 10 hrs and had excruciating pain. 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 Patient denies any fever, chills, chest pain, shortness of breath or calf pain. There are no new illness to report since last seen in the office. PHYSICAL EXAM:     Visit Vitals  /84 (BP 1 Location: Left arm, BP Patient Position: Sitting)   Pulse (!) 101   Temp 97.3 °F (36.3 °C) (Oral)   Resp 16   Ht 5' 10\" (1.778 m)   Wt 182 lb (82.6 kg)   SpO2 98%   BMI 26.11 kg/m²         Pain Scale: /10    Pain Assessment  11/20/2019   Location of Pain Foot   Pain Location Comment -   Location Modifiers Right   Severity of Pain 2   Quality of Pain Throbbing   Duration of Pain Persistent   Frequency of Pain Intermittent   Aggravating Factors Walking;Standing;Stairs   Aggravating Factors Comment -   Limiting Behavior Yes   Relieving Factors Rest;Exercises   Result of Injury No        Pain Location:       GEN:  Alert, well developed, well nourished, well appearing 45 y.o. female seated comfortably in no acute distress. Speech normal in context and clarity. Psychiatric: Affect, mood and conduct are appropriate.  Alert, oriented x 3 alert, oriented x 3, memory grossly intact, no dementia  Patient arrives to office via: with assistive device: CAM boot  Patient accompanied in the examination room by: spouse    M/S EXAMINATION OF: left foot  DRESSINGS: CDI other than mild soilage on dressing   DRAINAGE: none  INCISION: Incision looks good, skin well healed  SKIN: mild edema , no erythema, mild  ecchymosis, no warmth, dry    TENDERNESS:  mild tenderness to palpation   NEUROVASCULAR:  grossly intact. Positive distal pulses and capillary refill. DVT ASSESSMENT:  The calf is not tender to palpation. No evidence of DVT seen on physical exam.  ROM: not tested                  RADIOGRAPHS & DIAGNOSTIC STUDIES     X-rays, 3 views of the left foot reveals post op changes s/p Right Lapidus, Modified Florence/akin. Overall alignment is acceptable. Hardware is in good position with no indication of movement or failure. Soft tissue swelling mildly present. Degenerative changes are noted. Calcified vessels are not present. IMPRESSION:     Encounter Diagnoses     ICD-10-CM ICD-9-CM   1. Right foot pain M79.671 729.5   2. Bunion, right M21.611 727.1   3. Post-operative state Z98.890 V45.89   4. Osteoarthritis of right foot, unspecified osteoarthritis type M19.071 715.97   5. Metatarsalgia of right foot M77.41 726.70       PLAN:         Orders Placed This Encounter    Generic Supply Order    Generic Supply Order    [04024] Foot Min 3V    HYDROcodone-acetaminophen (NORCO) 5-325 mg per tablet    promethazine (PHENERGAN) 25 mg tablet             · Continue activity modification    · Weight bearing status:  weight bearing to the surgical extremity in CAM boot or post op shoe. You may wean to a supportive shoe    · Use Tubigrip for swelling    · Please follow up in 4 weeks    · Prescription for Miguelina Guerrero has been provided for severe pain         French Amaya presents today for post operative follow up and pain that is secondary to post operative state.   Since surgery, the patient's pain has not changed. Patient describes the pain as aching, pulsating, throbbing. Since surgery, the patient is not able to perform normal daily activities. Patient reports the following adverse side effects from treatment: constipation. Today - Pain Scale: /10    Prior records: N/A - post op  Personal or family history of psychiatric, addiction, or substance abuse: no    Aberrant behaviors: None.  reviewed: n/a. Post Op       Patient has been discussed with Dr. Alice Larson during this visit and he agrees with the assessment and plan    Patient expresses understanding of the plan. Patient education provided on post surgical care. REVIEW OF SYSTEMS:     Review of Systems: Chest pain: no  Shortness of breath: no  Fever: no  Night sweats: no  Weight loss: no  Constitutional signs: no    Review of all other systems is negative    Otherwise as noted in HPI      PAST MEDICAL HISTORY:     Past Medical History:   Diagnosis Date    ADHD     Anxiety     Body piercing     Navel    IBS (irritable bowel syndrome)     IUD (intrauterine device) in place     Sleep disorder        MEDICATIONS:     Current Outpatient Medications   Medication Sig    HYDROcodone-acetaminophen (NORCO) 5-325 mg per tablet Take 1 Tab by mouth every six (6) hours as needed for Pain for up to 7 days. Max Daily Amount: 4 Tabs. FOR **POST OPERATIVE PAIN    promethazine (PHENERGAN) 25 mg tablet Take 1 Tab by mouth every six (6) hours as needed for Nausea.  aspirin (ASPIRIN) 325 mg tablet Take 1 Tab by mouth daily.  valACYclovir (VALTREX) 500 mg tablet Take 500 mg by mouth daily.  polyethylene glycol (MIRALAX) 17 gram packet Take 1 Packet by mouth daily.  buPROPion SR (WELLBUTRIN SR) 150 mg SR tablet Take  by mouth daily.  Lisdexamfetamine (VYVANSE) 70 mg cap Take  by mouth daily.  lamoTRIgine (LAMICTAL) 100 mg tablet Take 200 mg by mouth two (2) times a day.     lurasidone (LATUDA) 20 mg tab tablet Take 40 mg by mouth daily (with dinner).  LORazepam (ATIVAN) 2 mg tablet Take  by mouth every six (6) hours as needed for Anxiety. No current facility-administered medications for this visit. ALLERGIES:     No Known Allergies      PAST SURGICAL HISTORY:     Past Surgical History:   Procedure Laterality Date    HX  SECTION      HX CHOLECYSTECTOMY      HX CYST REMOVAL         SOCIAL HISTORY:     Social History     Socioeconomic History    Marital status: UNKNOWN     Spouse name: Not on file    Number of children: Not on file    Years of education: Not on file    Highest education level: Not on file   Occupational History    Not on file   Social Needs    Financial resource strain: Not on file    Food insecurity:     Worry: Not on file     Inability: Not on file    Transportation needs:     Medical: Not on file     Non-medical: Not on file   Tobacco Use    Smoking status: Current Some Day Smoker    Smokeless tobacco: Never Used    Tobacco comment: 3 cigs per day   Substance and Sexual Activity    Alcohol use:  Yes     Alcohol/week: 2.0 standard drinks     Types: 2 Glasses of wine per week     Frequency: Never     Comment: weekends    Drug use: Never    Sexual activity: Not on file   Lifestyle    Physical activity:     Days per week: Not on file     Minutes per session: Not on file    Stress: Not on file   Relationships    Social connections:     Talks on phone: Not on file     Gets together: Not on file     Attends [employer] service: Not on file     Active member of club or organization: Not on file     Attends meetings of clubs or organizations: Not on file     Relationship status: Not on file    Intimate partner violence:     Fear of current or ex partner: Not on file     Emotionally abused: Not on file     Physically abused: Not on file     Forced sexual activity: Not on file   Other Topics Concern    Not on file   Social History Narrative    Not on file       FAMILY HISTORY:     Family History Problem Relation Age of Onset    Hypertension Mother     Hypertension Father            Nisha Ansari, Stefania Poles  11/20/2019

## 2021-03-24 ENCOUNTER — OFFICE VISIT (OUTPATIENT)
Dept: ORTHOPEDIC SURGERY | Age: 46
End: 2021-03-24
Payer: COMMERCIAL

## 2021-03-24 VITALS
HEART RATE: 110 BPM | BODY MASS INDEX: 26.05 KG/M2 | OXYGEN SATURATION: 98 % | TEMPERATURE: 97.5 F | HEIGHT: 70 IN | WEIGHT: 182 LBS

## 2021-03-24 DIAGNOSIS — M79.672 LEFT FOOT PAIN: ICD-10-CM
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
## 2021-03-24 DIAGNOSIS — S92.355A CLOSED NONDISPLACED FRACTURE OF FIFTH METATARSAL BONE OF LEFT FOOT, INITIAL ENCOUNTER: Primary | ICD-10-CM

## 2021-03-24 DIAGNOSIS — M79.672 ACUTE PAIN OF LEFT FOOT: ICD-10-CM

## 2021-03-24 PROCEDURE — 28470 CLTX METATARSAL FX WO MNP EA: CPT | Performed by: PHYSICIAN ASSISTANT

## 2021-03-24 PROCEDURE — 99214 OFFICE O/P EST MOD 30 MIN: CPT | Performed by: PHYSICIAN ASSISTANT

## 2021-03-24 PROCEDURE — 73630 X-RAY EXAM OF FOOT: CPT | Performed by: PHYSICIAN ASSISTANT

## 2021-03-24 RX ORDER — HYDROCODONE BITARTRATE AND ACETAMINOPHEN 5; 325 MG/1; MG/1
1 TABLET ORAL
Qty: 21 TAB | Refills: 0 | Status: SHIPPED | OUTPATIENT
Start: 2021-03-24 | End: 2021-03-31 | Stop reason: SDUPTHER

## 2021-03-24 NOTE — PROGRESS NOTES
Toni Lagos (1975) is a 39 y.o. female, established patient, 1.5 years s/p Right Lapidus, Modified [place]/akin Procedure/c-arm/lapiplasty Trace Ii/acumed/orthofix Bone Stimulator/nerve Block - Right completed on 9/13/2019, and she is here for evaluation of the following NEW chief complaint(s):    Chief Complaint   Patient presents with    Foot Pain     left          ASSESSMENT & PLAN:     Diagnoses and all orders for this visit:    1. Closed nondisplaced fracture of fifth metatarsal bone of left foot, initial encounter  -     LA CLOSED TX METATARSAL FX  -     AMB SUPPLY ORDER  -     AMB SUPPLY ORDER  -     HYDROcodone-acetaminophen (Norco) 5-325 mg per tablet; Take 1 Tab by mouth every eight (8) hours as needed for Pain for up to 7 days. Max Daily Amount: 3 Tabs. Indications: pain    2. Acute pain of left foot  -     AMB SUPPLY ORDER  -     HYDROcodone-acetaminophen (Norco) 5-325 mg per tablet; Take 1 Tab by mouth every eight (8) hours as needed for Pain for up to 7 days. Max Daily Amount: 3 Tabs. Indications: pain    3. Left foot pain  -     AMB POC XRAY, FOOT; COMPLETE, 3+ VIEW  -     HYDROcodone-acetaminophen (Norco) 5-325 mg per tablet; Take 1 Tab by mouth every eight (8) hours as needed for Pain for up to 7 days. Max Daily Amount: 3 Tabs. Indications: pain    4. Fall down stairs, initial encounter  -     AMB SUPPLY ORDER  -     HYDROcodone-acetaminophen (Norco) 5-325 mg per tablet; Take 1 Tab by mouth every eight (8) hours as needed for Pain for up to 7 days. Max Daily Amount: 3 Tabs. Indications: pain           ICD-10-CM ICD-9-CM   1. Closed nondisplaced fracture of fifth metatarsal bone of left foot, initial encounter  S92.355A 825.25   2. Acute pain of left foot  M79.672 729.5   3. Left foot pain  M79.672 729.5   4. Fall down stairs, initial encounter  W10. 8XXA E880.9       Patient Instructions       · Modify activity as directed.  Wear CAM boot   · Follow RICE protocol: Rest, Ice (not directly on the skin) and Elevate   · Take medication as directed, (if tolerated)  · Maintain proper nutrition   · Take a multivitamin, calcium + Vitamin D supplement daily (if tolerated)  · If you are a current smoker, quit or limit smoking  · Short CAM boot provided  · Please follow up in the office next week for repeat x-rays. Right not, the alignment is acceptable. However, if there is any change in the alignment, surgical fixation will be warranted. Follow-up and Dispositions    · Return in about 1 week (around 3/31/2021) for follow up evaluation, x-rays 3 views left foot. Dr. Carie Saavedra has been consulted regarding the patient during this visit and he agrees with the assessment and plan    Patient expresses understanding of the diagnosis and treatment plan. Patient education has been provided. Harmeet Hawley may have a reminder for a \"due or due soon\" health maintenance. I have asked that she contact her primary care provider for follow-up on this health maintenance.  was reviewed by Jelly Joseph PA-C on 3/24/2021. SUBJECTIVE & OBJECTIVE:     HPI    Since last seen in the office, the patient reports that she was at her friend's house on Saturday. She states that her friend had clean his hardwood stairs with Endust (which is not indicated for floors). She states that she slipped down the stairs injuring her left foot. Patient states that she noticed swelling and bruising to the left foot so she proceeded to follow the RICE protocol which incorporated elevation and icing. She states she has taken ASA for pain as she cannot take NSAIDs due to Invisalign treatment. She states she has had constant aching and throbbing since the incident and the ASA is not helping her pain. The  patient denies any fever, chills, CP, SOB or calf pain. The patient denies any other llness or injuries. There are no reported changes in medications, allergies, social or family history.  Harmeet Hawley has been experiencing pain, discomfort and associated symptoms and has tried modalities of treatment and/or self treatment confirmed as outlined in the pain assessment below. Pain Assessment  3/24/2021   Location of Pain Foot   Pain Location Comment -   Location Modifiers Left   Severity of Pain 7   Quality of Pain Sharp; Aching   Duration of Pain Persistent   Frequency of Pain Constant   Aggravating Factors Walking;Bending   Aggravating Factors Comment -   Limiting Behavior Yes   Relieving Factors Rest   Result of Injury Yes   Work-Related Injury No   Type of Injury Fall          Saray Santiago  has a past medical history of ADHD, Anxiety, Body piercing, IBS (irritable bowel syndrome), IUD (intrauterine device) in place, and Sleep disorder. . Other than previously noted, patient reports no changes in medications, allergies, social or family history. REVIEW OF SYSTEMS:                  All Below are Negative except as indicated in the HPI: See HPI                Constitutional: negative for fever, chills, night sweats and unexpected weight loss and malaise/fatigue              HEENT: Negative. No hoarseness, no double vision              Respiratory: Negative.  No difficulty breathing, No SOB              Cardiovascular: negative for chest pain, claudication, QUINTERO. (-) Leg swelling               Gastrointestinal: Negative for pain, Blood in stool, incontinence, pelvic pain, N/V/C/D              Genitourinary: No saddle anesthesia, pelvic pain, blood in urine, incontinence, dysuria               Neurological: Negative for dizziness and weakness, visual changes, confusion, headaches, seizures               Phychiatric/Behavioral: Negative for depression, memory loss, substance abuse               Extremities: Negative for hair changes, rash, or skin lesion changes              Hematologic: Negative for bleeding problems, bruising, pallor or swollen lymph nodes              Peripheral Vascular: No calf pain, no circulation deficits              Musculoskeletal: As per HPI above      PHYSICAL EXAM:        Visit Vitals  Pulse (!) 110   Temp 97.5 °F (36.4 °C)   Ht 5' 10\" (1.778 m)   Wt 182 lb (82.6 kg)   SpO2 98%   BMI 26.11 kg/m²         Constitutional: [x] Alert, cooperative, appears well-developed and well-nourished [x] No apparent distress      [] Abnormal - Body mass index is 26.11 kg/m². makes the treatment plan more complex     Mental status: [x] Alert and awake  [x] Oriented to person/place/time   [x] Normal mood/behavior/speech   [x] Normal dress/motor activity/thought processes/memory      [x] Able to follow commands    [] Abnormal - Dementia    Eyes:   EOM    [x]  Normal    [] Abnormal -   Sclera  [x]  Normal    [] Abnormal -          Discharge [x]  None visible   [] Abnormal -     HENT: [x] Normocephalic, atraumatic  [] Abnormal -   [x] Mouth/Throat: Mucous membranes are moist    External Ears [x] Normal, hearing intact  [] Abnormal -    Neck: [x] Supple.  No visualized mass, normal ROM [] Abnormal -     Pulmonary/Chest: [x] Respiratory effort normal   [x] No visualized signs of difficulty breathing or respiratory distress        [] Abnormal -        Cardiovascular/    [x] Normal pulses to each foot  [] Abnormal -   Peripheral Vascular:    Neurological:        [x] No Facial Asymmetry (Cranial nerve 7 motor function) (limited exam due to video visit) [x] No gross defects         [x] No gaze palsy        [] Abnormal -          Skin:        [x] No significant exanthematous lesions or discoloration noted on facial skin or visible areas       [] Abnormal -            Psychiatric:       [x] Normal Affect, mood, judgement, behavior, conduct [] Abnormal -        [x] No Hallucinations      Musculoskeletal:   [x] Normal gait with no signs of ataxia         [x] Normal range of motion of neck        [] Abnormal -       MUSCULOSKELETAL: EXAMINATION OF:     ANKLE/FOOT left    SKIN: Moderate edema, no erythema, moderate ecchymosis, no warmth. No rash or skin lesions. No signs of infection or cellulitis present. TENDERNESS:  Moderate tenderness to palpation to the lateral foot along the fifth metatarsal  NEUROVASCULAR:  Grossly intact. Motor/Sensory: NL/NL. Positive distal pulses and capillary refill. DVT ASSESSMENT:  The calf is not tender to palpation. No evidence of DVT seen on physical exam.  LYMPHATICS: No extremity lymphedema, No calf swelling, no tenderness to calf muscles  JOINT MOTION: Not tested. There is pain-free range of motion of adjacent joints. Negative joint effusion  JOINT/TENDON STABILITY: No Ankle or Subtalar instability or joint laxity. No peroneal sublux ability or dislocation  ALIGNMENT: Forefoot, Midfoot, Hindfoot WNL. On this date 3/24/2021, I have spent 33 minutes reviewing previous notes, diagnostic test results, x-rays and face to face with the patient discussing the diagnosis and importance of compliance with the treatment and plan, discussing the potential for surgery, answering all questions, as well as documenting, patient care on the day of the visit. An electronic signature was used to authenticate this note. -- Ozzie Dumont. [facility], JANELLE, PA-C  3/24/2021      Disclaimer: Sections of this note may have been dictated utilizing voice recognition software, which may have resulted in some phonetic based errors in grammar and contents. Even though attempts were made to correct all the mistakes, some may have been missed, and remained in the body of the document. If questions arise, please contact our department. RADIOGRAPHS & DIAGNOSTIC STUDIES       Left foot X-RAYS, 3 views (AP, LAT, OBL) completed 3/24/2021 AT [facility]    FINDINGS:    X-rays reveals an acute fracture to the fifth metatarsal. There is no displacement not at this point in time. Overall alignment is acceptable. Soft tissue swelling is moderately noted. No osteolytic or osteoblastic lesions noted.  Mineralization suggests no osteopenia. Degenerative changes not noted. Calcified vessels are not present. IMPRESSION:    As stated above      I have personally reviewed the results of the above study.  The interpretation of this study is my professional opinion    3/24/2021  Rere Navas PA-C

## 2021-03-24 NOTE — PATIENT INSTRUCTIONS
· Modify activity as directed. Wear CAM boot   · Follow RICE protocol: Rest, Ice (not directly on the skin) and Elevate   · Take medication as directed, (if tolerated)  · Maintain proper nutrition   · Take a multivitamin, calcium + Vitamin D supplement daily (if tolerated)  · If you are a current smoker, quit or limit smoking  · Short CAM boot provided  · Please follow up as instructed or sooner as needed         Broken Foot: Care Instructions  Your Care Instructions     A broken foot, or foot fracture, is a break in one or more of the bones in your foot. It may happen because of a sports injury, a fall, or other accident. A compound, or open, fracture occurs when a bone breaks through the skin. A break that does not poke through the skin is a closed fracture. Your treatment depends on the location and type of break in your foot. You may need a splint, a cast, or an orthopedic shoe. Certain kinds of injuries may need surgery at some time. Whatever your treatment, you can ease symptoms and help your foot heal with care at home. You may need 6 to 8 weeks or more to fully heal.  You heal best when you take good care of yourself. Eat a variety of healthy foods, and don't smoke. Follow-up care is a key part of your treatment and safety. Be sure to make and go to all appointments, and call your doctor if you are having problems. It's also a good idea to know your test results and keep a list of the medicines you take. How can you care for yourself at home? · Be safe with medicines. Take pain medicines exactly as directed. ? If the doctor gave you a prescription medicine for pain, take it as prescribed. ? If you are not taking a prescription pain medicine, ask your doctor if you can take an over-the-counter medicine. · Leave the splint on until your follow-up appointment. Do not put any weight on the injured foot. If you were given crutches, use them as directed.   · Put ice or a cold pack on your foot for 10 to 20 minutes at a time. Try to do this every 1 to 2 hours for the next 3 days (when you are awake) or until the swelling goes down. Put a thin cloth between the ice and your skin. · Prop up the sore foot on a pillow anytime you sit or lie down during the next 3 days. Try to keep it above the level of your heart. This will help reduce swelling. · Follow the cast care instructions your doctor gives you. If you have a splint, do not take it off unless your doctor tells you to. Cast and splint care  · If you have a removable splint, ask your doctor if it is okay to remove it to bathe. Your doctor may want you to keep it on as much as possible. · Keep your plaster splint covered by taping a sheet of plastic around it when you bathe. Water under the plaster can cause your skin to itch and hurt. · Never cut your splint. When should you call for help? Call 911 anytime you think you may need emergency care. For example, call if:    · You have chest pain, are short of breath, or you cough up blood. Call your doctor now or seek immediate medical care if:    · You have new or worse pain.     · Your foot is cool or pale or changes color.     · You have tingling, weakness, or numbness in your toes.     · Your cast or splint feels too tight.     · You have signs of a blood clot in your leg (called a deep vein thrombosis), such as:  ? Pain in your calf, back of the knee, thigh, or groin. ? Redness or swelling in your leg. Watch closely for changes in your health, and be sure to contact your doctor if:    · You have a problem with your splint or cast.     · You do not get better as expected. Where can you learn more? Go to http://www.gray.com/  Enter [id number] in the search box to learn more about \"Broken Foot: Care Instructions. \"  Current as of: March 2, 2020               Content Version: 12.6  © 8143-5243 [employer].    Care instructions adapted under license by [employer] (which disclaims liability or warranty for this information). If you have questions about a medical condition or this instruction, always ask your healthcare professional. [address] any warranty or liability for your use of this information.

## 2021-03-31 ENCOUNTER — OFFICE VISIT (OUTPATIENT)
Dept: ORTHOPEDIC SURGERY | Age: 46
End: 2021-03-31
Payer: COMMERCIAL

## 2021-03-31 VITALS
HEART RATE: 92 BPM | BODY MASS INDEX: 26.2 KG/M2 | WEIGHT: 183 LBS | OXYGEN SATURATION: 100 % | RESPIRATION RATE: 15 BRPM | TEMPERATURE: 97.7 F | HEIGHT: 70 IN

## 2021-03-31 DIAGNOSIS — M79.672 LEFT FOOT PAIN: ICD-10-CM

## 2021-03-31 DIAGNOSIS — S92.355A CLOSED NONDISPLACED FRACTURE OF FIFTH METATARSAL BONE OF LEFT FOOT, INITIAL ENCOUNTER: Primary | ICD-10-CM

## 2021-03-31 DIAGNOSIS — Z01.818 PRE-OPERATIVE EXAMINATION: ICD-10-CM

## 2021-03-31 DIAGNOSIS — M79.672 ACUTE PAIN OF LEFT FOOT: ICD-10-CM
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
## 2021-03-31 PROCEDURE — 99024 POSTOP FOLLOW-UP VISIT: CPT | Performed by: PHYSICIAN ASSISTANT

## 2021-03-31 PROCEDURE — 73630 X-RAY EXAM OF FOOT: CPT | Performed by: PHYSICIAN ASSISTANT

## 2021-03-31 RX ORDER — HYDROCODONE BITARTRATE AND ACETAMINOPHEN 5; 325 MG/1; MG/1
1 TABLET ORAL
Qty: 28 TAB | Refills: 0 | Status: SHIPPED | OUTPATIENT
Start: 2021-03-31 | End: 2021-04-07

## 2021-03-31 NOTE — PATIENT INSTRUCTIONS
Dr. Anthony Newsome Pre-operative Instructions: 
 
 
Patient: Davon Feldman :  1975 I understand I am to stop taking oral birth control pills, hormonal replacement therapy and all Aspirin, Aspirin containing medications, Non-Steroidal Anti-Inflammatory medications (such as Advil, Aleve, Motrin, Ibuprofen) and or Blood thinner medication such as Coumadin, Plavix, Heparin or others 5 days prior to surgery. I understand I am to STOP taking these Medications 5 days prior to surgery: 
I am to get instructions from my prescribing physician. 1. __As listed above_______________________ 2. _____________________________________ 3. _____________________________________ 4. _____________________________________ I understand that if I am taking daily medications for high blood pressure, I can take them the morning of surgery with a small sip of water. I will consult my prescribing physician or call [facility] with specific questions. I also understand that:  I am to report important observations or changes that may occur prior to surgery. If I have any changes in my physical condition, such as a rash, a fever, sore throat, abscess, ulcers, nausea, vomiting, or diarrhea. I am to call the office and I am to consult my primary care physician to assess and treat the problem.  I am not to eat or drink anything after midnight the night before my surgery.  I am not to drink alcoholic beverages 24 hours prior to surgery.  I am not to do any illegal drugs prior to surgery.  I am not to smoke at least 24 hours prior to surgery.  I am able and will shower or bathe before surgery. I will use the Hibiclens solution on my surgical site only. The hibiclens directions are one packet a day starting two days before surgery.  I will remove any nail polish, make-up or jewelry prior to arriving for my surgery.   
 
 If I wear glasses, contact lenses or dentures they must be removed prior to going to the operating room.  All body piercing and artifical eye-lashes must be removed prior to surgery  I will not wear any aerosol sprays, perfumes or skin creams.  I am to make arrangements for a family member or friend to accompany me to surgery and take me home after my surgery as I will not be allowed to leave the hospital alone. A cab or bus will not be acceptable. Please make arrange for someone to stay with you for 24 hours after surgery.  Patient has expressed understanding of the diagnosis, treatment and planned surgery Post operative medications will be e-scribed to your pharmacy on record if possible Acute Pain After Surgery: Care Instructions Your Care Instructions It's common to have some pain after surgery. Pain doesn't mean that something is wrong or that the surgery didn't go well. But when the pain is severe, it's important to work with your doctor to manage it. It's also important to be aware of a few facts about pain and pain medicine. · You are the only person who knows what your pain feels like. So be sure to tell your doctor when you are in pain or when the pain changes. Then he or she will know how to adjust your medicines. · Pain is often easier to control right after it starts. So it may be better to take regular doses of pain medicine and not wait until the pain gets bad. · Medicine can help control pain. But this doesn't mean you'll have no pain. Medicine works to keep the pain at a level you can live with. With time, you will feel better. Follow-up care is a key part of your treatment and safety. Be sure to make and go to all appointments, and call your doctor if you are having problems. It's also a good idea to know your test results and keep a list of the medicines you take. How can you care for yourself at home? · Be safe with medicines. Read and follow all instructions on the label.  
¨ If the doctor gave you a prescription medicine for pain, take it as prescribed. ¨ If you are not taking a prescription pain medicine, ask your doctor if you can take an over-the-counter medicine. · If you take an over-the-counter pain medicine, such as acetaminophen (Tylenol), ibuprofen (Advil, Motrin), or naproxen (Aleve), read and follow all instructions on the label. · Do not take two or more pain medicines at the same time unless the doctor told you to. · Do not drink alcohol while you are taking pain medicines. · Try to walk each day if your doctor recommends it. Start by walking a little more than you did the day before. Bit by bit, increase the amount you walk. Walking increases blood flow. It also helps prevent pneumonia and constipation. · To prevent constipation from opioid pain medicines: ¨ Talk to your doctor about a laxative. ¨ Include fruits, vegetables, beans, and whole grains in your diet each day. These foods are high in fiber. ¨ Drink plenty of fluids, enough so that your urine is light yellow or clear like water. Drink water, fruit juice, or other drinks that do not contain caffeine or alcohol. If you have kidney, heart, or liver disease and have to limit fluids, talk with your doctor before you increase the amount of fluids you drink. ¨ Take a fiber supplement, such as Citrucel or Metamucil, every day if needed. Read and follow all instructions on the label. If you take pain medicine for more than a few days, talk to your doctor before you take fiber. When should you call for help? Call your doctor now or seek immediate medical care if: 
 · Your pain gets worse. · Your pain is not controlled by medicine. Watch closely for changes in your health, and be sure to contact your doctor if you have any problems.

## 2021-03-31 NOTE — H&P (VIEW-ONLY)
FOOT AND ANKLE HISTORY AND PHYSICAL Patient: Nichole Cooley                    MRN: [id number]         SSN: [id number] YOB: 1975                        AGE: 39 y.o. SEX: female Patient scheduled for:  Open reduction internal fixation of left fifth metatarsal fracture; bone grafting; bone stimulator Date of surgery: 4/9/21 [facility] [address] [place] [address]. MD Juan Manuel 
ANESTHESIA TYPE:  General, Popliteal block ORDERS PLACED DURING H&P: 
 
Orders Placed This Encounter  SCHEDULE SURGERY Scheduling Instructions:  
   PATIENT NAME: Nichole Cooley PROCEDURE LOCATION: [facility]  
   TIME LINE FOR PROCEDURE :Elective LENGTH OF PROCEDURE: 1 HOURS PROCEDURE DATE: Anticipate hopefully this week ADMIT: Outpatient  
   (W37.371J) Closed nondisplaced fracture of fifth metatarsal bone of left foot, initial encounter  (primary encounter diagnosis) Plan: HYDROcodone-acetaminophen (Norco) 5-325 mg per   
         tablet, XR CHEST PA LAT, EKG, 12 LEAD, SUBSEQUENT, CBC WITH AUTOMATED DIFF, METABOLIC PANEL, COMPREHENSIVE, PTT, NOVEL CORONAVIRUS   
         (COVID-19), VITAMIN D, 25 HYDROXY, HCG URINE,   
         QL  
     
   (M79.672) Acute pain of left foot Plan: AMB POC XRAY, FOOT; COMPLETE, 3+ VIEW, HYDROcodone-acetaminophen (Norco) 5-325 mg per   
         tablet  
     
   (R24.318) Left foot pain Plan: HYDROcodone-acetaminophen (Norco) 5-325 mg per   
         tablet  
     
   (M24.6XUG) Fall down stairs, initial encounter Plan: HYDROcodone-acetaminophen (Norco) 5-325 mg per   
         tablet  
     
   (U59.807) Pre-operative examination Plan: EKG, 12 LEAD, SUBSEQUENT, CBC WITH AUTOMATED   
         DIFF, METABOLIC PANEL, COMPREHENSIVE, PTT,          NOVEL CORONAVIRUS (COVID-19), VITAMIN D, 25   
         HYDROXY, HCG URINE, QL  
     
     
   PROCEDURE: Open fixation, left left fifth metatarsal fracture distal one third 79506 Bone grafting fibula: 20900 EXTERNAL BONE STIMULATION  20974 ANESTHESIA: general anesthesia/regional   
     
   EQUIPMENT:  
    PARAGON 28 COMPREHENSIVE ANKLE FRACTURE TRAY SYSTEM   
     
    C-Arm, SYNTHES fibular ankle trauma tray system SURGERY REP:  , yes PHONE NUMBER:   
     
   LABS PREOP  
   CBC with diff, CMP, PT/INR, CXR PA/LATERAL, EKG 12 Lead, 25 HYDROXY VITAMIN D   
     
   CLEARANCES: PCP Is Patient on Blood Thinners?: No:    
      
   Orders Placed This Encounter [97355] Foot Min 3V  
       XR CHEST PA LAT  
       CBC WITH AUTOMATED DIFF  
       METABOLIC PANEL, COMPREHENSIVE  
       PTT NOVEL CORONAVIRUS (COVID-19) VITAMIN D, 25 HYDROXY  
       HCG URINE, QL  
       EKG, 12 LEAD, SUBSEQUENT HYDROcodone-acetaminophen (Norco) 5-325 mg per tablet  [47575] Foot Min 3V Order Specific Question:   Weight bearing? Answer:   No  
 XR CHEST PA LAT Standing Status:   Future Standing Expiration Date:   10/3/2021 Scheduling Instructions:  
   Please recheck to confirm if: 1. Patient has Allergry to IV contrast dye 2. Patient is pregnant Order Specific Question:   Is Patient Pregnant? Answer:   Unknown Order Specific Question:   Reason for Exam  
  Answer:   Preop Risk stratificatiion  CBC WITH AUTOMATED DIFF Standing Status:   Future Standing Expiration Date:   4/1/2022  METABOLIC PANEL, COMPREHENSIVE Standing Status:   Future Standing Expiration Date:   5/31/2021  
 PTT Standing Status:   Future Standing Expiration Date:   4/1/2022  NOVEL CORONAVIRUS (COVID-19) Standing Status:   Future Standing Expiration Date:   3/31/2022   Scheduling Instructions:  
   1) Due to current limited availability of the COVID-19 PCR test, tests will be prioritized and may not be completed.    
     
   2) Order only if the test result will change clinical management or necessary for a return to mission-critical employment decision.    
     
   3) Print and instruct patient to adhere to CDC home isolation program. (Link Above)    
     
   4) Set up or refer patient for a monitoring program.    
     
   5) Have patient sign up for and leverage MyChart (if not previously done). Order Specific Question:   Is this test for diagnosis or screening? Answer:   Screening Order Specific Question:   Symptomatic for COVID-19 as defined by CDC? Answer:   Unknown Order Specific Question:   Hospitalized for COVID-19? Answer:   Unknown Order Specific Question:   Admitted to ICU for COVID-19? Answer:   Unknown Order Specific Question:   Employed in healthcare setting? Answer:   Unknown Order Specific Question:   Resident in a congregate (group) care setting? Answer:   Unknown Order Specific Question:   Pregnant? Answer:   Unknown Order Specific Question:   Previously tested for COVID-19? Answer:   Unknown  VITAMIN D, 25 HYDROXY Standing Status:   Future Standing Expiration Date:   4/1/2022  
 HCG URINE, QL Standing Status:   Future Standing Expiration Date:   4/1/2022  EKG, 12 LEAD, SUBSEQUENT Standing Status:   Future Standing Expiration Date:   9/29/2021 Order Specific Question:   Reason for Exam: Answer:   pre op  
 HYDROcodone-acetaminophen (Norco) 5-325 mg per tablet Sig: Take 1 Tab by mouth every six (6) hours as needed for Pain for up to 7 days. Max Daily Amount: 4 Tabs. Indications: pain Dispense:  28 Tab Refill:  0 Post Operative Prescriptions have not been prescribed during the H&P HISTORY:  
 
The patient was seen in the office today for a preoperative history and physical for an upcoming above listed surgery.   The patient is a pleasant 39 y.o. female who has a history of left fifth metatarsal fracture sustained on 3/19/21 after slipping down the steps at her friends house. Since last seen in the office, the patient reports that she slipped on her bathroom rug over the weekend and that she continues to have pain and discomfort to her left fifth metatarsal. She has continued to wear the Tubigrip and CAM boot. She states she has had constant aching and throbbing. I had a long discussion with the patient advising her that the fracture pieces have moved and Dr. Krystin Randle has recommended surgical fixation. The patient would like to move forward with surgical fixation as soon as possible. Due to the current findings, affected activity of daily living and continued pain and discomfort, surgical intervention is indicated. The alternatives, risks, and complications, including but not limited to infection, blood loss, need for blood transfusion, neurovascular damage, ewelina-incisional numbness, subcutaneous hematoma, bone fracture, anesthetic complications, DVT, PE, death, RSD, postoperative stiffness and pain, possible surgical scar, delayed healing and nonhealing, reflexive sympathetic dystrophy, damage to blood vessels and nerves, need for more surgery, MI, and stroke, failure of hardware, gait disturbances  have been discussed. The patient understands and wishes to proceed with surgery. PAST MEDICAL HISTORY:  
 
Past Medical History:  
Diagnosis Date  ADHD  Anxiety  Body piercing Navel  IBS (irritable bowel syndrome)  IUD (intrauterine device) in place  Sleep disorder CURRENT MEDICATIONS:  
 
Current Outpatient Medications Medication Sig Dispense Refill  
 HYDROcodone-acetaminophen (Norco) 5-325 mg per tablet Take 1 Tab by mouth every six (6) hours as needed for Pain for up to 7 days. Max Daily Amount: 4 Tabs. Indications: pain 28 Tab 0  promethazine (PHENERGAN) 25 mg tablet Take 1 Tab by mouth every six (6) hours as needed for Nausea. 30 Tab 0  
 aspirin (ASPIRIN) 325 mg tablet Take 1 Tab by mouth daily. 30 Tab 0  
 valACYclovir (VALTREX) 500 mg tablet Take 500 mg by mouth daily.  polyethylene glycol (MIRALAX) 17 gram packet Take 1 Packet by mouth daily. 10 Packet 1  
 buPROPion SR (WELLBUTRIN SR) 150 mg SR tablet Take  by mouth daily.  Lisdexamfetamine (VYVANSE) 70 mg cap Take  by mouth daily.  lamoTRIgine (LAMICTAL) 100 mg tablet Take 200 mg by mouth two (2) times a day.  lurasidone (LATUDA) 20 mg tab tablet Take 40 mg by mouth daily (with dinner).  LORazepam (ATIVAN) 2 mg tablet Take  by mouth every six (6) hours as needed for Anxiety. ALLERGIES:  
 
No Known Allergies SURGICAL HISTORY:  
 
Past Surgical History:  
Procedure Laterality Date  FOOT/TOES SURGERY PROC UNLISTED  HX  SECTION    
 HX CHOLECYSTECTOMY  HX CYST REMOVAL    
 
 
SOCIAL HISTORY:  
 
Social History Socioeconomic History  Marital status: UNKNOWN Spouse name: Not on file  Number of children: Not on file  Years of education: Not on file  Highest education level: Not on file Tobacco Use  Smoking status: Current Some Day Smoker  Smokeless tobacco: Never Used  Tobacco comment: 3 cigs per day Substance and Sexual Activity  Alcohol use: Yes Alcohol/week: 2.0 standard drinks Types: 2 Glasses of wine per week Frequency: Never Comment: weekends  Drug use: Never FAMILY HISTORY:  
 
Family History Problem Relation Age of Onset  Hypertension Mother  Hypertension Father REVIEW OF SYSTEMS:  
 
Negative for fevers, chills, chest pain, shortness of breath, weight loss, recent illness General: Negative for fever and chills. No unexpected change in weight. Denies fatigue. No change in appetite. Skin: Negative for rash or itching. HEENT: Negative for congestion, sore throat, neck pain and neck stiffness.  No change in vision or hearing. Hasn't noted any enlarged lymph nodes in the neck. Cardiovascular:  Negative for chest pain and palpitations. Has not noted pedal edema. Respiratory: Negative for cough, colds, sinus, hemoptysis, shortness of breath and wheezing. Gastrointestinal: Negative for nausea and vomiting, rectal bleeding, coffee ground emesis, abdominal pain, diarrhea and constipation. Genitourinary: Negative for dysuria, frequency urgency, or burning on micturition. No flank pain, no foul smelling urine, no difficulty with initiating urination. Hematological: Negative for bleeding or easy bruising. Musculoskeletal: Negative for arthralgias, back pain or neck pain. Neurological: Negative for dizziness, seizures or syncopal episodes. Denies headaches. Endocrine: Denies excessive thirst.  No heat/cold intolerance. Psychiatric: Negative for depression or insomnia. PHYSICAL EXAMINATION:  
 
VITALS:  
Visit Vitals Pulse 92 Temp 97.7 °F (36.5 °C) Resp 15 Ht 5' 10\" (1.778 m) Wt 183 lb (83 kg) SpO2 100% BMI 26.26 kg/m² Pain Assessment  3/31/2021 Location of Pain Foot Pain Location Comment - Location Modifiers Left Severity of Pain 7 Quality of Pain Aching;Dull; Axel Lat; Throbbing Duration of Pain Persistent Frequency of Pain Constant Aggravating Factors Bending;Stretching;Straightening;Exercise;Squatting;Kneeling;Standing;Stairs; Walking Aggravating Factors Comment - Limiting Behavior Yes Relieving Factors Other (Comment);Ice;Rest  
Relieving Factors Comment tylenol #3 Result of Injury Yes Work-Related Injury No  
Type of Injury Fall GEN:  Well developed, well nourished 39 y.o. female in no acute distress. PSYCH: Alert an oriented to person, place and time. Mood, memory, affect, behavior and judgment normal. Speech normal in context and clarity. HEENT: Normocephalic and atraumatic. Eyes: Conjunctivae and EOM are normal.Pupils are equal, round, and reactive to light. External ear normal appearance, external nose normal appearing. Mouth/Throat: Oropharynx is clear and moist, able to handle oral secretions w/out difficulty, airway patent. NECK: Supple. Normal ROM, No lymphadenopathy. Trachea is midline. No bruising, swelling or deformity RESP: Clear to auscultation bilaterally. No audible wheezing from mouth. No rales, rhonchi. Normal effort and breath sounds. No respiratory use of accessory muscles during breathing or distress CHEST/ABDOMEN: Observation reveals: No audible wheezing from mouth. No accessory use of chest muscles during breathing. Non tender abdomen CARDIO:  Normal rate, regular rhythm and normal heart sounds. No MGR. ABDOMEN: Non-tender, non-distended, normoactive bowel sounds in all four quadrants. There is no tenderness. There is no rebound and no guarding. BACK: No CVA or spinal tenderness BREAST:  Deferred PELVIC:    Deferred RECTAL:  Deferred :           Deferred EXTREMITIES: EXAMINATION OF:  
 
ANKLE/FOOT left SKIN: Moderate edema, no erythema, moderate ecchymosis, no warmth. No rash or skin lesions. No signs of infection or cellulitis present. TENDERNESS:  Moderate tenderness to palpation to the lateral foot along the fifth metatarsal 
NEUROVASCULAR:  Grossly intact. Motor/Sensory: NL/NL. Positive distal pulses and capillary refill. DVT ASSESSMENT:  The calf is not tender to palpation. No evidence of DVT seen on physical exam. 
LYMPHATICS: No extremity lymphedema, No calf swelling, no tenderness to calf muscles JOINT MOTION: Not tested. There is pain-free range of motion of adjacent joints. Negative joint effusion JOINT/TENDON STABILITY: No Ankle or Subtalar instability or joint laxity. No peroneal sublux ability or dislocation ALIGNMENT: Forefoot, Midfoot, Hindfoot WNL. RADIOGRAPHS & DIAGNOSTIC STUDIES:  
 
No notes on file LABS:  
 
PENDING 
 
 
ASSESSMENT:  
 
Encounter Diagnoses ICD-10-CM ICD-9-CM 1. Closed nondisplaced fracture of fifth metatarsal bone of left foot, initial encounter  S92.355A 825.25  
2. Acute pain of left foot  M79.672 729.5 3. Left foot pain  M79.672 729.5 4. Fall down stairs, initial encounter  W10. 8XXA E880.9 5. Pre-operative examination  Z01.818 V72.84 PLAN:  
 
Again, the alternatives, risks, and complications, as well as expected outcome were discussed. The patient understands and agrees to proceed with the above listed surgery pending completion of pre-operative labs and diagnostic studies. Follow-up and Dispositions · Return in about 2 weeks (around 4/14/2021) for post surgical evaluation. Routing History The patient was counseled at length about the risks of bernadine Covid-19 during their perioperative period and any recovery window from their procedure. The patient was made aware that bernadine Covid-19  may worsen their prognosis for recovering from their procedure and lend to a higher morbidity and/or mortality risk. All material risks, benefits, and reasonable alternatives including postponing the procedure were discussed. The patient does wish to proceed with the procedure at this time. Ro CAMPBELL Caro [facility], MPA, PA-C 
3/31/2021 5:34 PM

## 2021-03-31 NOTE — H&P
FOOT AND ANKLE HISTORY AND PHYSICAL        Patient: Chaparro Anna                    MRN: [id number]         SSN: [id number]  YOB: 1975                        AGE: 39 y.o. SEX: female      Patient scheduled for:  Open reduction internal fixation of left fifth metatarsal fracture; bone grafting; bone stimulator   Date of surgery: 4/9/21   [facility] of Surgery: [facility]   Surgeon: Mg Zambrano MD  ANESTHESIA TYPE:  General, Popliteal block          ORDERS PLACED DURING H&P:    Orders Placed This Encounter    SCHEDULE SURGERY     Scheduling Instructions:      PATIENT NAME: Chaparro Anna             PROCEDURE LOCATION: [facility]      TIME LINE FOR PROCEDURE :Elective      LENGTH OF PROCEDURE: 1 HOURS      PROCEDURE DATE: Anticipate hopefully this week      ADMIT: Outpatient      (K34.633P) Closed nondisplaced fracture of fifth metatarsal bone of left foot, initial encounter  (primary encounter diagnosis)      Plan: HYDROcodone-acetaminophen (Norco) 5-325 mg per             tablet, XR CHEST PA LAT, EKG, 12 LEAD,             SUBSEQUENT, CBC WITH AUTOMATED DIFF, METABOLIC             PANEL, COMPREHENSIVE, PTT, NOVEL CORONAVIRUS             (COVID-19), VITAMIN D, 25 HYDROXY, HCG URINE,             QL            (M79.672) Acute pain of left foot      Plan: AMB POC XRAY, FOOT; COMPLETE, 3+ VIEW,             HYDROcodone-acetaminophen (Norco) 5-325 mg per             tablet            (N30.717) Left foot pain      Plan: HYDROcodone-acetaminophen (Norco) 5-325 mg per             tablet            (N95.1SKJ) Fall down stairs, initial encounter      Plan: HYDROcodone-acetaminophen (Norco) 5-325 mg per             tablet            (Z01.818) Pre-operative examination      Plan: EKG, 12 LEAD, SUBSEQUENT, CBC WITH AUTOMATED             DIFF, METABOLIC PANEL, COMPREHENSIVE, PTT,             NOVEL CORONAVIRUS (COVID-19), VITAMIN D, 25             HYDROXY, HCG URINE, QL                  PROCEDURE: Open fixation, left left fifth metatarsal fracture distal one third 02367      Bone grafting fibula: 20900      EXTERNAL BONE STIMULATION  20974             ANESTHESIA: general anesthesia/regional             EQUIPMENT:       PARAGON 28 COMPREHENSIVE ANKLE FRACTURE TRAY SYSTEM              C-Arm, SYNTHES fibular ankle trauma tray system             SURGERY REP:  , yes       PHONE NUMBER:             LABS PREOP      CBC with diff, CMP, PT/INR, CXR PA/LATERAL, EKG 12 Lead, 25 HYDROXY VITAMIN D             CLEARANCES: PCP      Is Patient on Blood Thinners?: No:               Orders Placed This Encounter          [62995] Foot Min 3V          XR CHEST PA LAT          CBC WITH AUTOMATED DIFF          METABOLIC PANEL, COMPREHENSIVE          PTT          NOVEL CORONAVIRUS (COVID-19)          VITAMIN D, 25 HYDROXY          HCG URINE, QL          EKG, 12 LEAD, SUBSEQUENT          HYDROcodone-acetaminophen (Norco) 5-325 mg per tablet    [01924] Foot Min 3V     Order Specific Question:   Weight bearing? Answer:   No    XR CHEST PA LAT     Standing Status:   Future     Standing Expiration Date:   10/3/2021     Scheduling Instructions:      Please recheck to confirm if:      1. Patient has Allergry to IV contrast dye      2. Patient is pregnant     Order Specific Question:   Is Patient Pregnant?      Answer:   Unknown     Order Specific Question:   Reason for Exam     Answer:   Preop Risk stratificatiion    CBC WITH AUTOMATED DIFF     Standing Status:   Future     Standing Expiration Date:   4/7/1971    METABOLIC PANEL, COMPREHENSIVE     Standing Status:   Future     Standing Expiration Date:   5/31/2021    PTT     Standing Status:   Future     Standing Expiration Date:   4/1/2022    NOVEL CORONAVIRUS (COVID-19)     Standing Status:   Future     Standing Expiration Date:   3/31/2022     Scheduling Instructions:      1) Due to current limited availability of the COVID-19 PCR test, tests will be prioritized and may not be completed.              2) Order only if the test result will change clinical management or necessary for a return to mission-critical employment decision.              3) Print and instruct patient to adhere to CDC home isolation program. (Link Above)              4) Set up or refer patient for a monitoring program.              5) Have patient sign up for and leverage MyChart (if not previously done). Order Specific Question:   Is this test for diagnosis or screening? Answer:   Screening     Order Specific Question:   Symptomatic for COVID-19 as defined by CDC? Answer:   Unknown     Order Specific Question:   Hospitalized for COVID-19? Answer:   Unknown     Order Specific Question:   Admitted to ICU for COVID-19? Answer:   Unknown     Order Specific Question:   Employed in healthcare setting? Answer:   Unknown     Order Specific Question:   Resident in a congregate (group) care setting? Answer:   Unknown     Order Specific Question:   Pregnant? Answer:   Unknown     Order Specific Question:   Previously tested for COVID-19? Answer:   Unknown    VITAMIN D, 25 HYDROXY     Standing Status:   Future     Standing Expiration Date:   4/1/2022    HCG URINE, QL     Standing Status:   Future     Standing Expiration Date:   4/1/2022    EKG, 12 LEAD, SUBSEQUENT     Standing Status:   Future     Standing Expiration Date:   9/29/2021     Order Specific Question:   Reason for Exam:     Answer:   pre op    HYDROcodone-acetaminophen (Norco) 5-325 mg per tablet     Sig: Take 1 Tab by mouth every six (6) hours as needed for Pain for up to 7 days. Max Daily Amount: 4 Tabs. Indications: pain     Dispense:  28 Tab     Refill:  0        Post Operative Prescriptions have not been prescribed during the H&P            HISTORY:     The patient was seen in the office today for a preoperative history and physical for an upcoming above listed surgery.   The patient is a pleasant 39 y.o. female who has a history of left fifth metatarsal fracture sustained on 3/19/21 after slipping down the steps at her friends house. Since last seen in the office, the patient reports that she slipped on her bathroom rug over the weekend and that she continues to have pain and discomfort to her left fifth metatarsal. She has continued to wear the Tubigrip and CAM boot. She states she has had constant aching and throbbing. I had a long discussion with the patient advising her that the fracture pieces have moved and Dr. Jackie Meredith has recommended surgical fixation. The patient would like to move forward with surgical fixation as soon as possible. Due to the current findings, affected activity of daily living and continued pain and discomfort, surgical intervention is indicated. The alternatives, risks, and complications, including but not limited to infection, blood loss, need for blood transfusion, neurovascular damage, ewelina-incisional numbness, subcutaneous hematoma, bone fracture, anesthetic complications, DVT, PE, death, RSD, postoperative stiffness and pain, possible surgical scar, delayed healing and nonhealing, reflexive sympathetic dystrophy, damage to blood vessels and nerves, need for more surgery, MI, and stroke, failure of hardware, gait disturbances  have been discussed. The patient understands and wishes to proceed with surgery. PAST MEDICAL HISTORY:     Past Medical History:   Diagnosis Date    ADHD     Anxiety     Body piercing     Navel    IBS (irritable bowel syndrome)     IUD (intrauterine device) in place     Sleep disorder        CURRENT MEDICATIONS:     Current Outpatient Medications   Medication Sig Dispense Refill    HYDROcodone-acetaminophen (Norco) 5-325 mg per tablet Take 1 Tab by mouth every six (6) hours as needed for Pain for up to 7 days. Max Daily Amount: 4 Tabs.  Indications: pain 28 Tab 0    promethazine (PHENERGAN) 25 mg tablet Take 1 Tab by mouth every six (6) hours as needed for Nausea. 30 Tab 0    aspirin (ASPIRIN) 325 mg tablet Take 1 Tab by mouth daily. 30 Tab 0    valACYclovir (VALTREX) 500 mg tablet Take 500 mg by mouth daily.  polyethylene glycol (MIRALAX) 17 gram packet Take 1 Packet by mouth daily. 10 Packet 1    buPROPion SR (WELLBUTRIN SR) 150 mg SR tablet Take  by mouth daily.  Lisdexamfetamine (VYVANSE) 70 mg cap Take  by mouth daily.  lamoTRIgine (LAMICTAL) 100 mg tablet Take 200 mg by mouth two (2) times a day.  lurasidone (LATUDA) 20 mg tab tablet Take 40 mg by mouth daily (with dinner).  LORazepam (ATIVAN) 2 mg tablet Take  by mouth every six (6) hours as needed for Anxiety. ALLERGIES:     No Known Allergies      SURGICAL HISTORY:     Past Surgical History:   Procedure Laterality Date    FOOT/TOES SURGERY PROC UNLISTED      HX  SECTION      HX CHOLECYSTECTOMY      HX CYST REMOVAL         SOCIAL HISTORY:     Social History     Socioeconomic History    Marital status: UNKNOWN     Spouse name: Not on file    Number of children: Not on file    Years of education: Not on file    Highest education level: Not on file   Tobacco Use    Smoking status: Current Some Day Smoker    Smokeless tobacco: Never Used    Tobacco comment: 3 cigs per day   Substance and Sexual Activity    Alcohol use: Yes     Alcohol/week: 2.0 standard drinks     Types: 2 Glasses of wine per week     Frequency: Never     Comment: weekends    Drug use: Never       FAMILY HISTORY:     Family History   Problem Relation Age of Onset    Hypertension Mother     Hypertension Father        REVIEW OF SYSTEMS:     Negative for fevers, chills, chest pain, shortness of breath, weight loss, recent illness     General: Negative for fever and chills. No unexpected change in weight. Denies fatigue. No change in appetite. Skin: Negative for rash or itching. HEENT: Negative for congestion, sore throat, neck pain and neck stiffness.  No change in vision or hearing. Hasn't noted any enlarged lymph nodes in the neck. Cardiovascular:  Negative for chest pain and palpitations. Has not noted pedal edema. Respiratory: Negative for cough, colds, sinus, hemoptysis, shortness of breath and wheezing. Gastrointestinal: Negative for nausea and vomiting, rectal bleeding, coffee ground emesis, abdominal pain, diarrhea and constipation. Genitourinary: Negative for dysuria, frequency urgency, or burning on micturition. No flank pain, no foul smelling urine, no difficulty with initiating urination. Hematological: Negative for bleeding or easy bruising. Musculoskeletal: Negative for arthralgias, back pain or neck pain. Neurological: Negative for dizziness, seizures or syncopal episodes. Denies headaches. Endocrine: Denies excessive thirst.  No heat/cold intolerance. Psychiatric: Negative for depression or insomnia. PHYSICAL EXAMINATION:     VITALS:   Visit Vitals  Pulse 92   Temp 97.7 °F (36.5 °C)   Resp 15   Ht 5' 10\" (1.778 m)   Wt 183 lb (83 kg)   SpO2 100%   BMI 26.26 kg/m²       Pain Assessment  3/31/2021   Location of Pain Foot   Pain Location Comment -   Location Modifiers Left   Severity of Pain 7   Quality of Pain Aching;Dull; [place]; Throbbing   Duration of Pain Persistent   Frequency of Pain Constant   Aggravating Factors Bending;Stretching;Straightening;Exercise;Squatting;Kneeling;Standing;Stairs; Walking   Aggravating Factors Comment -   Limiting Behavior Yes   Relieving Factors Other (Comment);Ice;Rest   Relieving Factors Comment tylenol #3   Result of Injury Yes   Work-Related Injury No   Type of Injury Fall        GEN:  Well developed, well nourished 39 y.o. female in no acute distress. PSYCH: Alert an oriented to person, place and time. Mood, memory, affect, behavior and judgment normal. Speech normal in context and clarity. HEENT: Normocephalic and atraumatic. Eyes: Conjunctivae and EOM are normal.Pupils are equal, round, and reactive to light. External ear normal appearance, external nose normal appearing. Mouth/Throat: Oropharynx is clear and moist, able to handle oral secretions w/out difficulty, airway patent. NECK: Supple. Normal ROM, No lymphadenopathy. Trachea is midline. No bruising, swelling or deformity  RESP: Clear to auscultation bilaterally. No audible wheezing from mouth. No rales, rhonchi. Normal effort and breath sounds. No respiratory use of accessory muscles during breathing or distress  CHEST/ABDOMEN: Observation reveals: No audible wheezing from mouth. No accessory use of chest muscles during breathing. Non tender abdomen  CARDIO:  Normal rate, regular rhythm and normal heart sounds. No MGR. ABDOMEN: Non-tender, non-distended, normoactive bowel sounds in all four quadrants. There is no tenderness. There is no rebound and no guarding. BACK: No CVA or spinal tenderness  BREAST:  Deferred  PELVIC:    Deferred   RECTAL:  Deferred   :           Deferred  EXTREMITIES: EXAMINATION OF:     ANKLE/FOOT left    SKIN: Moderate edema, no erythema, moderate ecchymosis, no warmth. No rash or skin lesions. No signs of infection or cellulitis present. TENDERNESS:  Moderate tenderness to palpation to the lateral foot along the fifth metatarsal  NEUROVASCULAR:  Grossly intact. Motor/Sensory: NL/NL. Positive distal pulses and capillary refill. DVT ASSESSMENT:  The calf is not tender to palpation. No evidence of DVT seen on physical exam.  LYMPHATICS: No extremity lymphedema, No calf swelling, no tenderness to calf muscles  JOINT MOTION: Not tested. There is pain-free range of motion of adjacent joints. Negative joint effusion  JOINT/TENDON STABILITY: No Ankle or Subtalar instability or joint laxity. No peroneal sublux ability or dislocation  ALIGNMENT: Forefoot, Midfoot, Hindfoot WNL. RADIOGRAPHS & DIAGNOSTIC STUDIES:     No notes on file      LABS:     PENDING      ASSESSMENT:     Encounter Diagnoses     ICD-10-CM ICD-9-CM   1. Closed nondisplaced fracture of fifth metatarsal bone of left foot, initial encounter  S92.355A 825.25   2. Acute pain of left foot  M79.672 729.5   3. Left foot pain  M79.672 729.5   4. Fall down stairs, initial encounter  W10. 8XXA E880.9   5. Pre-operative examination  Z01.818 V72.84        PLAN:     Again, the alternatives, risks, and complications, as well as expected outcome were discussed. The patient understands and agrees to proceed with the above listed surgery pending completion of pre-operative labs and diagnostic studies. Follow-up and Dispositions    · Return in about 2 weeks (around 4/14/2021) for post surgical evaluation. Routing History            The patient was counseled at length about the risks of bernadine Covid-19 during their perioperative period and any recovery window from their procedure. The patient was made aware that bernadine Covid-19  may worsen their prognosis for recovering from their procedure and lend to a higher morbidity and/or mortality risk. All material risks, benefits, and reasonable alternatives including postponing the procedure were discussed. The patient does wish to proceed with the procedure at this time. Ro Farooq [facility], JANELLE, THONY  3/31/2021  5:34 PM

## 2021-03-31 NOTE — PROGRESS NOTES
Nina Hartmann (1975) is a 39 y.o. female, established patient, 1.5 years s/p Right Lapidus, Modified [place]/akin Procedure/c-arm/lapiplasty Trace Ii/acumed/orthofix Bone Stimulator/nerve Block - Right completed on 9/13/2019, and she is here for follow up evaluation of a left fifth metatarsal fracture. Her chief complaint(s):    Chief Complaint   Patient presents with    Foot Pain     left        ASSESSMENT & PLAN:     Diagnoses and all orders for this visit:    1. Closed nondisplaced fracture of fifth metatarsal bone of left foot, initial encounter  -     HYDROcodone-acetaminophen (Norco) 5-325 mg per tablet; Take 1 Tab by mouth every six (6) hours as needed for Pain for up to 7 days. Max Daily Amount: 4 Tabs. Indications: pain  -     XR CHEST PA LAT; Future  -     EKG, 12 LEAD, SUBSEQUENT; Future  -     CBC WITH AUTOMATED DIFF; Future  -     METABOLIC PANEL, COMPREHENSIVE; Future  -     PTT; Future  -     NOVEL CORONAVIRUS (COVID-19); Future  -     VITAMIN D, 25 HYDROXY; Future  -     HCG URINE, QL; Future  -     SCHEDULE SURGERY    2. Acute pain of left foot  -     AMB POC XRAY, FOOT; COMPLETE, 3+ VIEW  -     HYDROcodone-acetaminophen (Norco) 5-325 mg per tablet; Take 1 Tab by mouth every six (6) hours as needed for Pain for up to 7 days. Max Daily Amount: 4 Tabs. Indications: pain    3. Left foot pain  -     HYDROcodone-acetaminophen (Norco) 5-325 mg per tablet; Take 1 Tab by mouth every six (6) hours as needed for Pain for up to 7 days. Max Daily Amount: 4 Tabs. Indications: pain    4. Fall down stairs, initial encounter  -     HYDROcodone-acetaminophen (Norco) 5-325 mg per tablet; Take 1 Tab by mouth every six (6) hours as needed for Pain for up to 7 days. Max Daily Amount: 4 Tabs. Indications: pain    5. Pre-operative examination  -     EKG, 12 LEAD, SUBSEQUENT; Future  -     CBC WITH AUTOMATED DIFF; Future  -     METABOLIC PANEL, COMPREHENSIVE;  Future  -     PTT; Future  -     NOVEL CORONAVIRUS (COVID-19); Future  -     VITAMIN D, 25 HYDROXY; Future  -     HCG URINE, QL; Future           ICD-10-CM ICD-9-CM   1. Closed nondisplaced fracture of fifth metatarsal bone of left foot, initial encounter  S92.355A 825.25   2. Acute pain of left foot  M79.672 729.5   3. Left foot pain  M79.672 729.5   4. Fall down stairs, initial encounter  W10. 8XXA E880.9   5. Pre-operative examination  Z01.818 V72.84       Patient Instructions                 Dr. Dayne Bae Pre-operative Instructions:      Patient: Memo Bullard   :  1975     I understand I am to stop taking oral birth control pills, hormonal replacement therapy and all Aspirin, Aspirin containing medications, Non-Steroidal Anti-Inflammatory medications (such as Advil, Aleve, Motrin, Ibuprofen) and or Blood thinner medication such as Coumadin, Plavix, Heparin or others 5 days prior to surgery. I understand I am to STOP taking these Medications 5 days prior to surgery:  I am to get instructions from my prescribing physician. 1. __As listed above_______________________  2. _____________________________________  3. _____________________________________  4. _____________________________________    I understand that if I am taking daily medications for high blood pressure, I can take them the morning of surgery with a small sip of water. I will consult my prescribing physician or call [facility] with specific questions. I also understand that:     I am to report important observations or changes that may occur prior to surgery. If I have any changes in my physical condition, such as a rash, a fever, sore throat, abscess, ulcers, nausea, vomiting, or diarrhea. I am to call the office and I am to consult my primary care physician to assess and treat the problem.  I am not to eat or drink anything after midnight the night before my surgery.  I am not to drink alcoholic beverages 24 hours prior to surgery.       I am not to do any illegal drugs prior to surgery.  I am not to smoke at least 24 hours prior to surgery.  I am able and will shower or bathe before surgery. I will use the Hibiclens solution on my surgical site only. The hibiclens directions are one packet a day starting two days before surgery.  I will remove any nail polish, make-up or jewelry prior to arriving for my surgery.  If I wear glasses, contact lenses or dentures they must be removed prior to going to the operating room.  All body piercing and artifical eye-lashes must be removed prior to surgery     I will not wear any aerosol sprays, perfumes or skin creams.  I am to make arrangements for a family member or friend to accompany me to surgery and take me home after my surgery as I will not be allowed to leave the hospital alone. A cab or bus will not be acceptable. Please make arrange for someone to stay with you for 24 hours after surgery.  Patient has expressed understanding of the diagnosis, treatment and planned surgery      Post operative medications will be e-scribed to your pharmacy on record if possible    Follow-up and Dispositions    · Return in about 2 weeks (around 4/14/2021) for post surgical evaluation. Routing History           Dr. Dyane Bae has been consulted regarding the patient during this visit and he agrees with the assessment and plan    Patient expresses understanding of the diagnosis and treatment plan. Patient education has been provided. Memo Bullard may have a reminder for a \"due or due soon\" health maintenance. I have asked that she contact her primary care provider for follow-up on this health maintenance.  was reviewed by Lavern Xiong PA-C on 3/31/2021.        SUBJECTIVE & OBJECTIVE:     HPI    Since last seen in the office, the patient reports that she slipped on her bathroom rug over the weekend and that she continues to have pain and discomfort to her left fifth metatarsal. She has continued to wear the Tubigrip and CAM boot. She states she has had constant aching and throbbing. I had a long discussion with the patient advising her that the fracture pieces have moved and Dr. Taylor Turner has recommended surgical fixation. The patient would like to move forward with surgical fixation as soon as possible. I will have Marisue Denver Page contact the patient to schedule surgery for next week on Thursday or Friday. H&P was completed in the office today. The  patient denies any fever, chills, CP, SOB or calf pain. The patient denies any other new llness or injuries. There are no reported changes in medications, allergies, social or family history. Amor Santiago has been experiencing pain, discomfort and associated symptoms and has tried modalities of treatment and/or self treatment confirmed as outlined in the pain assessment below. Pain Assessment  3/31/2021   Location of Pain Foot   Pain Location Comment -   Location Modifiers Left   Severity of Pain 7   Quality of Pain Aching;Dull; Gris ; Throbbing   Duration of Pain Persistent   Frequency of Pain Constant   Aggravating Factors Bending;Stretching;Straightening;Exercise;Squatting;Kneeling;Standing;Stairs; Walking   Aggravating Factors Comment -   Limiting Behavior Yes   Relieving Factors Other (Comment);Ice;Rest   Relieving Factors Comment tylenol #3   Result of Injury Yes   Work-Related Injury No   Type of Injury Fall          Amor Santiago  has a past medical history of ADHD, Anxiety, Body piercing, IBS (irritable bowel syndrome), IUD (intrauterine device) in place, and Sleep disorder. . Other than previously noted, patient reports no changes in medications, allergies, social or family history. REVIEW OF SYSTEMS:                  All Below are Negative except as indicated in the HPI: See HPI                Constitutional: negative for fever, chills, night sweats and unexpected weight loss and malaise/fatigue              HEENT: Negative.  No hoarseness, no double vision Respiratory: Negative. No difficulty breathing, No SOB              Cardiovascular: negative for chest pain, claudication, QUINTERO. (-) Leg swelling               Gastrointestinal: Negative for pain, Blood in stool, incontinence, pelvic pain, N/V/C/D              Genitourinary: No saddle anesthesia, pelvic pain, blood in urine, incontinence, dysuria               Neurological: Negative for dizziness and weakness, visual changes, confusion, headaches, seizures               Phychiatric/Behavioral: Negative for depression, memory loss, substance abuse               Extremities: Negative for hair changes, rash, or skin lesion changes              Hematologic: Negative for bleeding problems, bruising, pallor or swollen lymph nodes              Peripheral Vascular: No calf pain, no circulation deficits              Musculoskeletal: As per HPI above      PHYSICAL EXAM:        Visit Vitals  Pulse 92   Temp 97.7 °F (36.5 °C)   Resp 15   Ht 5' 10\" (1.778 m)   Wt 183 lb (83 kg)   SpO2 100%   BMI 26.26 kg/m²         Constitutional: [x] Alert, cooperative, appears well-developed and well-nourished [x] No apparent distress      [] Abnormal - Body mass index is 26.26 kg/m². makes the treatment plan more complex     Mental status: [x] Alert and awake  [x] Oriented to person/place/time   [x] Normal mood/behavior/speech   [x] Normal dress/motor activity/thought processes/memory      [x] Able to follow commands    [] Abnormal - Dementia    Eyes:   EOM    [x]  Normal    [] Abnormal -   Sclera  [x]  Normal    [] Abnormal -          Discharge [x]  None visible   [] Abnormal -     HENT: [x] Normocephalic, atraumatic  [] Abnormal -   [x] Mouth/Throat: Mucous membranes are moist    External Ears [x] Normal, hearing intact  [] Abnormal -    Neck: [x] Supple.  No visualized mass, normal ROM [] Abnormal -     Pulmonary/Chest: [x] Respiratory effort normal   [x] No visualized signs of difficulty breathing or respiratory distress        [] Abnormal -        Cardiovascular/    [x] Normal pulses to each foot  [] Abnormal -   Peripheral Vascular:    Neurological:        [x] No Facial Asymmetry (Cranial nerve 7 motor function) (limited exam due to video visit) [x] No gross defects         [x] No gaze palsy        [] Abnormal -          Skin:        [x] No significant exanthematous lesions or discoloration noted on facial skin or visible areas       [] Abnormal -            Psychiatric:       [x] Normal Affect, mood, judgement, behavior, conduct [] Abnormal -        [x] No Hallucinations      Musculoskeletal:   [x] Normal gait with no signs of ataxia         [x] Normal range of motion of neck        [] Abnormal -       MUSCULOSKELETAL: EXAMINATION OF:     ANKLE/FOOT left    SKIN: Moderate edema, no erythema, moderate ecchymosis, no warmth. No rash or skin lesions. No signs of infection or cellulitis present. TENDERNESS:  Moderate tenderness to palpation to the lateral foot along the fifth metatarsal  NEUROVASCULAR:  Grossly intact. Motor/Sensory: NL/NL. Positive distal pulses and capillary refill. DVT ASSESSMENT:  The calf is not tender to palpation. No evidence of DVT seen on physical exam.  LYMPHATICS: No extremity lymphedema, No calf swelling, no tenderness to calf muscles  JOINT MOTION: Not tested. There is pain-free range of motion of adjacent joints. Negative joint effusion  JOINT/TENDON STABILITY: No Ankle or Subtalar instability or joint laxity. No peroneal sublux ability or dislocation  ALIGNMENT: Forefoot, Midfoot, Hindfoot WNL. On this date 3/31/2021, I have spent 34 minutes reviewing previous notes, diagnostic test results, x-rays and face to face with the patient discussing the diagnosis and importance of compliance with the treatment and plan, discussing the proposed surgery, answering all questions, as well as documenting, patient care on the day of the visit. An electronic signature was used to authenticate this note.   -- True Guillermo Pilar Russo, [facility], JANELLE, THONY  3/31/2021      Disclaimer: Sections of this note may have been dictated utilizing voice recognition software, which may have resulted in some phonetic based errors in grammar and contents. Even though attempts were made to correct all the mistakes, some may have been missed, and remained in the body of the document. If questions arise, please contact our department. RADIOGRAPHS & DIAGNOSTIC STUDIES       Left foot X-RAYS, 3 views (AP, LAT, OBL) completed 3/31/2021 AT [facility]    FINDINGS:    X-rays reveals an acute fracture to the fifth metatarsal. There is more gapping noted at fracture site on comparison to prior x-rays. Soft tissue swelling is mildly noted. No osteolytic or osteoblastic lesions noted. Mineralization suggests no osteopenia. Degenerative changes not noted. Calcified vessels are not present. IMPRESSION:    As stated above      I have personally reviewed the results of the above study.  The interpretation of this study is my professional opinion    3/31/2021  Sony Tian PA-C

## 2021-04-01 DIAGNOSIS — S92.355A CLOSED NONDISPLACED FRACTURE OF FIFTH METATARSAL BONE OF LEFT FOOT, INITIAL ENCOUNTER: ICD-10-CM

## 2021-04-01 DIAGNOSIS — Z01.818 PRE-OPERATIVE EXAMINATION: ICD-10-CM

## 2021-04-02 ENCOUNTER — HOSPITAL ENCOUNTER (OUTPATIENT)
Dept: LAB | Age: 46
Discharge: HOME | End: 2021-04-02
Payer: COMMERCIAL

## 2021-04-02 ENCOUNTER — HOSPITAL ENCOUNTER (OUTPATIENT)
Dept: GENERAL RADIOLOGY | Age: 46
Discharge: HOME | End: 2021-04-02
Payer: COMMERCIAL

## 2021-04-02 DIAGNOSIS — S92.355A CLOSED NONDISPLACED FRACTURE OF FIFTH METATARSAL BONE OF LEFT FOOT, INITIAL ENCOUNTER: ICD-10-CM

## 2021-04-02 DIAGNOSIS — Z01.818 PRE-OPERATIVE EXAMINATION: ICD-10-CM

## 2021-04-02 LAB
25(OH)D3 SERPL-MCNC: 15.7 NG/ML (ref 30–100)
ALBUMIN SERPL-MCNC: 4.3 G/DL (ref 3.4–5)
ALBUMIN/GLOB SERPL: 1.3 {RATIO} (ref 0.8–1.7)
ALP SERPL-CCNC: 96 U/L (ref 45–117)
ALT SERPL-CCNC: 30 U/L (ref 13–56)
ANION GAP SERPL CALC-SCNC: 11 MMOL/L (ref 3–18)
APTT PPP: 31.3 SEC (ref 23–36.4)
AST SERPL-CCNC: 23 U/L (ref 10–38)
ATRIAL RATE: 104 BPM
BASOPHILS # BLD: 0 K/UL (ref 0–0.1)
BASOPHILS NFR BLD: 0 % (ref 0–2)
BILIRUB SERPL-MCNC: 0.5 MG/DL (ref 0.2–1)
BUN SERPL-MCNC: 13 MG/DL (ref 7–18)
BUN/CREAT SERPL: 19 (ref 12–20)
CALCIUM SERPL-MCNC: 9.3 MG/DL (ref 8.5–10.1)
CALCULATED P AXIS, ECG09: 65 DEGREES
CALCULATED R AXIS, ECG10: 60 DEGREES
CALCULATED T AXIS, ECG11: 70 DEGREES
CHLORIDE SERPL-SCNC: 105 MMOL/L (ref 100–111)
CO2 SERPL-SCNC: 23 MMOL/L (ref 21–32)
CREAT SERPL-MCNC: 0.7 MG/DL (ref 0.6–1.3)
DIAGNOSIS, 93000: NORMAL
DIFFERENTIAL METHOD BLD: ABNORMAL
EOSINOPHIL # BLD: 0.1 K/UL (ref 0–0.4)
EOSINOPHIL NFR BLD: 1 % (ref 0–5)
ERYTHROCYTE [DISTWIDTH] IN BLOOD BY AUTOMATED COUNT: 12.4 % (ref 11.6–14.5)
GLOBULIN SER CALC-MCNC: 3.3 G/DL (ref 2–4)
GLUCOSE SERPL-MCNC: 83 MG/DL (ref 74–99)
HCG UR QL: NEGATIVE
HCT VFR BLD AUTO: 45 % (ref 35–45)
HGB BLD-MCNC: 15.2 G/DL (ref 12–16)
LYMPHOCYTES # BLD: 0.8 K/UL (ref 0.9–3.6)
LYMPHOCYTES NFR BLD: 12 % (ref 21–52)
MCH RBC QN AUTO: 33.3 PG (ref 24–34)
MCHC RBC AUTO-ENTMCNC: 33.8 G/DL (ref 31–37)
MCV RBC AUTO: 98.5 FL (ref 74–97)
MONOCYTES # BLD: 0.6 K/UL (ref 0.05–1.2)
MONOCYTES NFR BLD: 10 % (ref 3–10)
NEUTS SEG # BLD: 5.2 K/UL (ref 1.8–8)
NEUTS SEG NFR BLD: 77 % (ref 40–73)
P-R INTERVAL, ECG05: 156 MS
PLATELET # BLD AUTO: 267 K/UL (ref 135–420)
PMV BLD AUTO: 9.4 FL (ref 9.2–11.8)
POTASSIUM SERPL-SCNC: 4.1 MMOL/L (ref 3.5–5.5)
PROT SERPL-MCNC: 7.6 G/DL (ref 6.4–8.2)
Q-T INTERVAL, ECG07: 354 MS
QRS DURATION, ECG06: 94 MS
QTC CALCULATION (BEZET), ECG08: 465 MS
RBC # BLD AUTO: 4.57 M/UL (ref 4.2–5.3)
SODIUM SERPL-SCNC: 139 MMOL/L (ref 136–145)
VENTRICULAR RATE, ECG03: 104 BPM
WBC # BLD AUTO: 6.7 K/UL (ref 4.6–13.2)

## 2021-04-02 PROCEDURE — 82306 VITAMIN D 25 HYDROXY: CPT

## 2021-04-02 PROCEDURE — 85730 THROMBOPLASTIN TIME PARTIAL: CPT

## 2021-04-02 PROCEDURE — 85025 COMPLETE CBC W/AUTO DIFF WBC: CPT

## 2021-04-02 PROCEDURE — 80053 COMPREHEN METABOLIC PANEL: CPT

## 2021-04-02 PROCEDURE — 36415 COLL VENOUS BLD VENIPUNCTURE: CPT

## 2021-04-02 PROCEDURE — 71046 X-RAY EXAM CHEST 2 VIEWS: CPT

## 2021-04-02 PROCEDURE — 93005 ELECTROCARDIOGRAM TRACING: CPT

## 2021-04-02 PROCEDURE — 81025 URINE PREGNANCY TEST: CPT

## 2021-04-05 ENCOUNTER — HOSPITAL ENCOUNTER (OUTPATIENT)
Dept: PREADMISSION TESTING | Age: 46
Discharge: HOME | End: 2021-04-05
Payer: COMMERCIAL

## 2021-04-05 DIAGNOSIS — S92.355A CLOSED NONDISPLACED FRACTURE OF FIFTH METATARSAL BONE OF LEFT FOOT, INITIAL ENCOUNTER: ICD-10-CM

## 2021-04-05 DIAGNOSIS — Z01.818 PRE-OPERATIVE EXAMINATION: ICD-10-CM

## 2021-04-05 PROCEDURE — U0003 INFECTIOUS AGENT DETECTION BY NUCLEIC ACID (DNA OR RNA); SEVERE ACUTE RESPIRATORY SYNDROME CORONAVIRUS 2 (SARS-COV-2) (CORONAVIRUS DISEASE [COVID-19]), AMPLIFIED PROBE TECHNIQUE, MAKING USE OF HIGH THROUGHPUT TECHNOLOGIES AS DESCRIBED BY CMS-2020-01-R: HCPCS

## 2021-04-06 DIAGNOSIS — S92.355A CLOSED NONDISPLACED FRACTURE OF FIFTH METATARSAL BONE OF LEFT FOOT, INITIAL ENCOUNTER: Primary | ICD-10-CM

## 2021-04-06 DIAGNOSIS — G89.18 POST-OPERATIVE PAIN: ICD-10-CM

## 2021-04-06 LAB — SARS-COV-2, COV2NT: NOT DETECTED

## 2021-04-06 RX ORDER — ONDANSETRON 4 MG/1
4 TABLET, ORALLY DISINTEGRATING ORAL
Qty: 30 TAB | Refills: 1 | Status: SHIPPED | OUTPATIENT
Start: 2021-04-09

## 2021-04-06 RX ORDER — ERGOCALCIFEROL 1.25 MG/1
50000 CAPSULE ORAL
Qty: 4 CAP | Refills: 1 | Status: SHIPPED | OUTPATIENT
Start: 2021-04-06 | End: 2021-04-06

## 2021-04-06 RX ORDER — POLYETHYLENE GLYCOL 3350 17 G/17G
17 POWDER, FOR SOLUTION ORAL DAILY
Qty: 255 G | Refills: 1 | Status: SHIPPED | OUTPATIENT
Start: 2021-04-09

## 2021-04-06 RX ORDER — ASPIRIN 325 MG
325 TABLET ORAL DAILY
Qty: 30 TAB | Refills: 0 | Status: SHIPPED | OUTPATIENT
Start: 2021-04-09

## 2021-04-06 RX ORDER — HYDROCODONE BITARTRATE AND ACETAMINOPHEN 7.5; 325 MG/1; MG/1
1-2 TABLET ORAL
Qty: 42 TAB | Refills: 0 | Status: SHIPPED | OUTPATIENT
Start: 2021-04-09 | End: 2021-04-16 | Stop reason: SDUPTHER

## 2021-04-06 RX ORDER — ERGOCALCIFEROL 1.25 MG/1
CAPSULE ORAL
Qty: 12 CAP | Refills: 0 | Status: SHIPPED | OUTPATIENT
Start: 2021-04-06

## 2021-04-06 NOTE — PROGRESS NOTES
Spoke to the patient and let know about the Vitamin D level the medication that RICK Grissom sent to the pharmacy and the about the post op medication.

## 2021-04-06 NOTE — PROGRESS NOTES
Lab Results   Component Value Date/Time    Vitamin D 25-Hydroxy 15.7 (L) 04/02/2021 01:29 PM         The following medication(s) e-scribed to patients [facility]	[place] located on [address]. The patient should  the Rx for Vitamin D now and other prescriptions will be ready of  on 4/8/21:    Orders Placed This Encounter    ergocalciferol (ERGOCALCIFEROL) 1,250 mcg (50,000 unit) capsule     Sig: Take 1 Cap by mouth every seven (7) days. Indications: vitamin D deficiency (high dose therapy)     Dispense:  4 Cap     Refill:  1    HYDROcodone-acetaminophen (NORCO) 7.5-325 mg per tablet     Sig: Take 1-2 Tabs by mouth every eight (8) hours as needed for Pain (After surgery) for up to 7 days. Max Daily Amount: 6 Tabs. Dispense:  42 Tab     Refill:  0     Tere Echevaria surgery is scheduled for 4/9/21 and she can  her prescriptions on 4/8/21.  polyethylene glycol (Miralax) 17 gram/dose powder     Sig: Take 17 g by mouth daily. Dispense:  255 g     Refill:  1     Tere Echevaria surgery is scheduled for 4/9/21 and she can  her prescriptions on 4/8/21.  ondansetron (ZOFRAN ODT) 4 mg disintegrating tablet     Sig: Take 1 Tab by mouth every eight (8) hours as needed for Nausea or Vomiting. Indications: prevent nausea and vomiting after surgery     Dispense:  30 Tab     Refill:  1     Tere Echevaria surgery is scheduled for 4/9/21 and she can  her prescriptions on 4/8/21.  aspirin (ASPIRIN) 325 mg tablet     Sig: Take 1 Tab by mouth daily. Dispense:  30 Tab     Refill:  0     Tere Echevaria surgery is scheduled for 4/9/21 and she can  her prescriptions on 4/8/21.         Silvia León PA-C  4/6/2021  1:05 PM [Other: _____] : [unfilled] [FreeTextEntry2] : February 24, 2020, he did not keep his appointment for followup of Melanoma x 2

## 2021-04-08 ENCOUNTER — ANESTHESIA EVENT (OUTPATIENT)
Dept: SURGERY | Age: 46
End: 2021-04-08
Payer: COMMERCIAL

## 2021-04-08 RX ORDER — ROPIVACAINE HYDROCHLORIDE 2 MG/ML
30 INJECTION, SOLUTION EPIDURAL; INFILTRATION; PERINEURAL
Status: CANCELLED | OUTPATIENT
Start: 2021-04-08 | End: 2021-04-08

## 2021-04-09 ENCOUNTER — ANESTHESIA (OUTPATIENT)
Dept: SURGERY | Age: 46
End: 2021-04-09
Payer: COMMERCIAL

## 2021-04-09 ENCOUNTER — HOSPITAL ENCOUNTER (OUTPATIENT)
Age: 46
Setting detail: OUTPATIENT SURGERY
Discharge: HOME | End: 2021-04-09
Attending: ORTHOPAEDIC SURGERY | Admitting: ORTHOPAEDIC SURGERY
Payer: COMMERCIAL

## 2021-04-09 ENCOUNTER — APPOINTMENT (OUTPATIENT)
Dept: GENERAL RADIOLOGY | Age: 46
End: 2021-04-09
Attending: ORTHOPAEDIC SURGERY
Payer: COMMERCIAL

## 2021-04-09 VITALS
DIASTOLIC BLOOD PRESSURE: 80 MMHG | SYSTOLIC BLOOD PRESSURE: 126 MMHG | WEIGHT: 179.2 LBS | OXYGEN SATURATION: 96 % | HEART RATE: 103 BPM | RESPIRATION RATE: 14 BRPM | TEMPERATURE: 98.7 F | BODY MASS INDEX: 25.65 KG/M2 | HEIGHT: 70 IN

## 2021-04-09 DIAGNOSIS — M85.872 OSTEOPENIA OF LEFT FOOT: ICD-10-CM

## 2021-04-09 DIAGNOSIS — S92.352A CLOSED DISPLACED FRACTURE OF FIFTH METATARSAL BONE OF LEFT FOOT, INITIAL ENCOUNTER: Primary | ICD-10-CM

## 2021-04-09 LAB — HCG UR QL: NEGATIVE

## 2021-04-09 PROCEDURE — 74011250636 HC RX REV CODE- 250/636: Performed by: ANESTHESIOLOGY

## 2021-04-09 PROCEDURE — 77030042509 HC BIT DRL -C: Performed by: ORTHOPAEDIC SURGERY

## 2021-04-09 PROCEDURE — 28485 OPTX METATARSAL FX EACH: CPT | Performed by: ORTHOPAEDIC SURGERY

## 2021-04-09 PROCEDURE — 76210000021 HC REC RM PH II 0.5 TO 1 HR: Performed by: ORTHOPAEDIC SURGERY

## 2021-04-09 PROCEDURE — C1713 ANCHOR/SCREW BN/BN,TIS/BN: HCPCS | Performed by: ORTHOPAEDIC SURGERY

## 2021-04-09 PROCEDURE — 76010000131 HC OR TIME 2 TO 2.5 HR: Performed by: ORTHOPAEDIC SURGERY

## 2021-04-09 PROCEDURE — 77030039002 HC WRE K PG28 -B: Performed by: ORTHOPAEDIC SURGERY

## 2021-04-09 PROCEDURE — 74011000250 HC RX REV CODE- 250: Performed by: NURSE ANESTHETIST, CERTIFIED REGISTERED

## 2021-04-09 PROCEDURE — 77030003666 HC NDL SPINAL BD -A: Performed by: ORTHOPAEDIC SURGERY

## 2021-04-09 PROCEDURE — 73630 X-RAY EXAM OF FOOT: CPT

## 2021-04-09 PROCEDURE — 76942 ECHO GUIDE FOR BIOPSY: CPT | Performed by: ANESTHESIOLOGY

## 2021-04-09 PROCEDURE — 64450 NJX AA&/STRD OTHER PN/BRANCH: CPT | Performed by: ANESTHESIOLOGY

## 2021-04-09 PROCEDURE — 74011250636 HC RX REV CODE- 250/636: Performed by: PHYSICIAN ASSISTANT

## 2021-04-09 PROCEDURE — 74011250636 HC RX REV CODE- 250/636: Performed by: NURSE ANESTHETIST, CERTIFIED REGISTERED

## 2021-04-09 PROCEDURE — 77030010509 HC AIRWY LMA MSK TELE -A: Performed by: ANESTHESIOLOGY

## 2021-04-09 PROCEDURE — 74011250636 HC RX REV CODE- 250/636

## 2021-04-09 PROCEDURE — 77030042510 HC BIT DRL -D: Performed by: ORTHOPAEDIC SURGERY

## 2021-04-09 PROCEDURE — 77030014685 HC STIM BN GRWTH PHYSIO EXTERNAL ORTH -I1: Performed by: ORTHOPAEDIC SURGERY

## 2021-04-09 PROCEDURE — 77030020274 HC MISC IMPL ORTHOPEDIC: Performed by: ORTHOPAEDIC SURGERY

## 2021-04-09 PROCEDURE — 77030034966 HC GRFT DBM PLS PST ALLOFUS 1CC ALLO- C: Performed by: ORTHOPAEDIC SURGERY

## 2021-04-09 PROCEDURE — 77030038692 HC WND DEB SYS IRMX -B: Performed by: ORTHOPAEDIC SURGERY

## 2021-04-09 PROCEDURE — 74011250637 HC RX REV CODE- 250/637: Performed by: NURSE ANESTHETIST, CERTIFIED REGISTERED

## 2021-04-09 PROCEDURE — 74011000272 HC RX REV CODE- 272: Performed by: ORTHOPAEDIC SURGERY

## 2021-04-09 PROCEDURE — 2709999900 HC NON-CHARGEABLE SUPPLY: Performed by: ORTHOPAEDIC SURGERY

## 2021-04-09 PROCEDURE — 77030040922 HC BLNKT HYPOTHRM STRY -A: Performed by: ORTHOPAEDIC SURGERY

## 2021-04-09 PROCEDURE — 81025 URINE PREGNANCY TEST: CPT

## 2021-04-09 PROCEDURE — 76060000035 HC ANESTHESIA 2 TO 2.5 HR: Performed by: ORTHOPAEDIC SURGERY

## 2021-04-09 PROCEDURE — 77030013079 HC BLNKT BAIR HGGR 3M -A: Performed by: ANESTHESIOLOGY

## 2021-04-09 PROCEDURE — 01480 ANES OPEN PX LOWER L/A/F NOS: CPT | Performed by: ANESTHESIOLOGY

## 2021-04-09 PROCEDURE — 77030040361 HC SLV COMPR DVT MDII -B: Performed by: ORTHOPAEDIC SURGERY

## 2021-04-09 PROCEDURE — 74011000250 HC RX REV CODE- 250: Performed by: ORTHOPAEDIC SURGERY

## 2021-04-09 PROCEDURE — 76210000006 HC OR PH I REC 0.5 TO 1 HR: Performed by: ORTHOPAEDIC SURGERY

## 2021-04-09 PROCEDURE — 77030002916 HC SUT ETHLN J&J -A: Performed by: ORTHOPAEDIC SURGERY

## 2021-04-09 PROCEDURE — 01480 ANES OPEN PX LOWER L/A/F NOS: CPT | Performed by: NURSE ANESTHETIST, CERTIFIED REGISTERED

## 2021-04-09 DEVICE — BABY GORILLA, SCREW, Ø2.0 X 12MM, NON-LOCKING, TIA
Type: IMPLANTABLE DEVICE | Site: FOOT | Status: FUNCTIONAL
Brand: BABY GORILLA/GORILLA PLATING SYSTEM

## 2021-04-09 DEVICE — K-WIRE, SINGLE ENDED TROCAR TIP, SMOOTH, 1.2 X 150MM
Type: IMPLANTABLE DEVICE | Status: FUNCTIONAL
Brand: MONSTER SCREW SYSTEM

## 2021-04-09 DEVICE — BABY GORILLA, PLATE, T2, 08 HOLE LOCKING, 1.1MM, TIA
Type: IMPLANTABLE DEVICE | Status: FUNCTIONAL
Brand: BABY GORILLA/GORILLA PLATING SYSTEM

## 2021-04-09 DEVICE — P28, OLIVE WIRE,  STANDARD (1.3MM TIP), SMOOTH, SS
Type: IMPLANTABLE DEVICE | Status: FUNCTIONAL
Brand: BABY GORILLA/GORILLA PLATING SYSTEM

## 2021-04-09 DEVICE — PHYSIO-STIM BONE GROWTH STIMULATOR
Type: IMPLANTABLE DEVICE | Site: FOOT | Status: FUNCTIONAL
Brand: PHYSIO-STIM

## 2021-04-09 RX ORDER — ZOLPIDEM TARTRATE 12.5 MG/1
12.5 TABLET, FILM COATED, EXTENDED RELEASE ORAL
COMMUNITY
Start: 2021-03-30

## 2021-04-09 RX ORDER — CEFAZOLIN SODIUM 2 G/50ML
2 SOLUTION INTRAVENOUS ONCE
Status: COMPLETED | OUTPATIENT
Start: 2021-04-09 | End: 2021-04-09

## 2021-04-09 RX ORDER — OXYCODONE AND ACETAMINOPHEN 5; 325 MG/1; MG/1
1 TABLET ORAL AS NEEDED
Status: COMPLETED | OUTPATIENT
Start: 2021-04-09 | End: 2021-04-09

## 2021-04-09 RX ORDER — SODIUM CHLORIDE, SODIUM LACTATE, POTASSIUM CHLORIDE, CALCIUM CHLORIDE 600; 310; 30; 20 MG/100ML; MG/100ML; MG/100ML; MG/100ML
25 INJECTION, SOLUTION INTRAVENOUS CONTINUOUS
Status: DISCONTINUED | OUTPATIENT
Start: 2021-04-09 | End: 2021-04-09 | Stop reason: HOSPADM

## 2021-04-09 RX ORDER — ONDANSETRON 2 MG/ML
4 INJECTION INTRAMUSCULAR; INTRAVENOUS ONCE
Status: DISCONTINUED | OUTPATIENT
Start: 2021-04-09 | End: 2021-04-09 | Stop reason: HOSPADM

## 2021-04-09 RX ORDER — SODIUM CHLORIDE, SODIUM LACTATE, POTASSIUM CHLORIDE, CALCIUM CHLORIDE 600; 310; 30; 20 MG/100ML; MG/100ML; MG/100ML; MG/100ML
100 INJECTION, SOLUTION INTRAVENOUS CONTINUOUS
Status: DISCONTINUED | OUTPATIENT
Start: 2021-04-09 | End: 2021-04-09 | Stop reason: HOSPADM

## 2021-04-09 RX ORDER — MIDAZOLAM HYDROCHLORIDE 1 MG/ML
2 INJECTION, SOLUTION INTRAMUSCULAR; INTRAVENOUS ONCE
Status: COMPLETED | OUTPATIENT
Start: 2021-04-09 | End: 2021-04-09

## 2021-04-09 RX ORDER — SODIUM CHLORIDE 0.9 % (FLUSH) 0.9 %
5-40 SYRINGE (ML) INJECTION AS NEEDED
Status: DISCONTINUED | OUTPATIENT
Start: 2021-04-09 | End: 2021-04-09 | Stop reason: HOSPADM

## 2021-04-09 RX ORDER — DEXAMETHASONE SODIUM PHOSPHATE 4 MG/ML
INJECTION, SOLUTION INTRA-ARTICULAR; INTRALESIONAL; INTRAMUSCULAR; INTRAVENOUS; SOFT TISSUE AS NEEDED
Status: DISCONTINUED | OUTPATIENT
Start: 2021-04-09 | End: 2021-04-09 | Stop reason: HOSPADM

## 2021-04-09 RX ORDER — LIDOCAINE HYDROCHLORIDE 20 MG/ML
INJECTION, SOLUTION EPIDURAL; INFILTRATION; INTRACAUDAL; PERINEURAL AS NEEDED
Status: DISCONTINUED | OUTPATIENT
Start: 2021-04-09 | End: 2021-04-09 | Stop reason: HOSPADM

## 2021-04-09 RX ORDER — ONDANSETRON 2 MG/ML
INJECTION INTRAMUSCULAR; INTRAVENOUS AS NEEDED
Status: DISCONTINUED | OUTPATIENT
Start: 2021-04-09 | End: 2021-04-09 | Stop reason: HOSPADM

## 2021-04-09 RX ORDER — HYDROMORPHONE HYDROCHLORIDE 1 MG/ML
0.5 INJECTION, SOLUTION INTRAMUSCULAR; INTRAVENOUS; SUBCUTANEOUS
Status: DISCONTINUED | OUTPATIENT
Start: 2021-04-09 | End: 2021-04-09 | Stop reason: HOSPADM

## 2021-04-09 RX ORDER — ROPIVACAINE HYDROCHLORIDE 5 MG/ML
INJECTION, SOLUTION EPIDURAL; INFILTRATION; PERINEURAL
Status: COMPLETED | OUTPATIENT
Start: 2021-04-09 | End: 2021-04-09

## 2021-04-09 RX ORDER — SODIUM CHLORIDE 0.9 % (FLUSH) 0.9 %
5-40 SYRINGE (ML) INJECTION EVERY 8 HOURS
Status: DISCONTINUED | OUTPATIENT
Start: 2021-04-09 | End: 2021-04-09 | Stop reason: HOSPADM

## 2021-04-09 RX ORDER — FENTANYL CITRATE 50 UG/ML
25 INJECTION, SOLUTION INTRAMUSCULAR; INTRAVENOUS
Status: DISCONTINUED | OUTPATIENT
Start: 2021-04-09 | End: 2021-04-09 | Stop reason: HOSPADM

## 2021-04-09 RX ORDER — DIPHENHYDRAMINE HYDROCHLORIDE 50 MG/ML
12.5 INJECTION, SOLUTION INTRAMUSCULAR; INTRAVENOUS
Status: DISCONTINUED | OUTPATIENT
Start: 2021-04-09 | End: 2021-04-09 | Stop reason: HOSPADM

## 2021-04-09 RX ORDER — OMEPRAZOLE 20 MG/1
20 CAPSULE, DELAYED RELEASE ORAL DAILY
COMMUNITY
Start: 2021-01-19

## 2021-04-09 RX ORDER — GLYCOPYRROLATE 0.2 MG/ML
INJECTION INTRAMUSCULAR; INTRAVENOUS AS NEEDED
Status: DISCONTINUED | OUTPATIENT
Start: 2021-04-09 | End: 2021-04-09 | Stop reason: HOSPADM

## 2021-04-09 RX ORDER — ROPIVACAINE HYDROCHLORIDE 5 MG/ML
30 INJECTION, SOLUTION EPIDURAL; INFILTRATION; PERINEURAL
Status: COMPLETED | OUTPATIENT
Start: 2021-04-09 | End: 2021-04-09

## 2021-04-09 RX ORDER — FENTANYL CITRATE 50 UG/ML
INJECTION, SOLUTION INTRAMUSCULAR; INTRAVENOUS
Status: COMPLETED
Start: 2021-04-09 | End: 2021-04-09

## 2021-04-09 RX ORDER — FENTANYL CITRATE 50 UG/ML
100 INJECTION, SOLUTION INTRAMUSCULAR; INTRAVENOUS ONCE
Status: COMPLETED | OUTPATIENT
Start: 2021-04-09 | End: 2021-04-09

## 2021-04-09 RX ORDER — HYDROCODONE BITARTRATE AND ACETAMINOPHEN 7.5; 325 MG/1; MG/1
2 TABLET ORAL
Qty: 40 TAB | Refills: 0 | Status: SHIPPED | OUTPATIENT
Start: 2021-04-09 | End: 2021-04-16

## 2021-04-09 RX ORDER — PROPOFOL 10 MG/ML
INJECTION, EMULSION INTRAVENOUS AS NEEDED
Status: DISCONTINUED | OUTPATIENT
Start: 2021-04-09 | End: 2021-04-09 | Stop reason: HOSPADM

## 2021-04-09 RX ORDER — FAMOTIDINE 20 MG/1
20 TABLET, FILM COATED ORAL ONCE
Status: COMPLETED | OUTPATIENT
Start: 2021-04-09 | End: 2021-04-09

## 2021-04-09 RX ORDER — ALBUTEROL SULFATE 0.83 MG/ML
2.5 SOLUTION RESPIRATORY (INHALATION) AS NEEDED
Status: DISCONTINUED | OUTPATIENT
Start: 2021-04-09 | End: 2021-04-09 | Stop reason: HOSPADM

## 2021-04-09 RX ORDER — LIDOCAINE HYDROCHLORIDE 10 MG/ML
0.1 INJECTION, SOLUTION EPIDURAL; INFILTRATION; INTRACAUDAL; PERINEURAL AS NEEDED
Status: DISCONTINUED | OUTPATIENT
Start: 2021-04-09 | End: 2021-04-09 | Stop reason: HOSPADM

## 2021-04-09 RX ADMIN — SODIUM CHLORIDE, SODIUM LACTATE, POTASSIUM CHLORIDE, AND CALCIUM CHLORIDE 25 ML/HR: 600; 310; 30; 20 INJECTION, SOLUTION INTRAVENOUS at 09:22

## 2021-04-09 RX ADMIN — PROPOFOL 200 MG: 10 INJECTION, EMULSION INTRAVENOUS at 11:24

## 2021-04-09 RX ADMIN — MIDAZOLAM 2 MG: 1 INJECTION INTRAMUSCULAR; INTRAVENOUS at 09:35

## 2021-04-09 RX ADMIN — FENTANYL CITRATE 25 MCG: 50 INJECTION, SOLUTION INTRAMUSCULAR; INTRAVENOUS at 13:40

## 2021-04-09 RX ADMIN — ONDANSETRON 4 MG: 2 INJECTION INTRAMUSCULAR; INTRAVENOUS at 11:55

## 2021-04-09 RX ADMIN — SODIUM CHLORIDE, SODIUM LACTATE, POTASSIUM CHLORIDE, AND CALCIUM CHLORIDE: 600; 310; 30; 20 INJECTION, SOLUTION INTRAVENOUS at 11:10

## 2021-04-09 RX ADMIN — OXYCODONE HYDROCHLORIDE AND ACETAMINOPHEN 1 TABLET: 5; 325 TABLET ORAL at 15:00

## 2021-04-09 RX ADMIN — DEXAMETHASONE SODIUM PHOSPHATE 4 MG: 4 INJECTION, SOLUTION INTRAMUSCULAR; INTRAVENOUS at 11:55

## 2021-04-09 RX ADMIN — FENTANYL CITRATE 25 MCG: 50 INJECTION, SOLUTION INTRAMUSCULAR; INTRAVENOUS at 13:31

## 2021-04-09 RX ADMIN — LIDOCAINE HYDROCHLORIDE 80 MG: 20 INJECTION, SOLUTION EPIDURAL; INFILTRATION; INTRACAUDAL; PERINEURAL at 11:24

## 2021-04-09 RX ADMIN — ROPIVACAINE HYDROCHLORIDE 20 ML: 5 INJECTION, SOLUTION EPIDURAL; INFILTRATION; PERINEURAL at 09:41

## 2021-04-09 RX ADMIN — GLYCOPYRROLATE 0.2 MG: 0.2 INJECTION INTRAMUSCULAR; INTRAVENOUS at 11:24

## 2021-04-09 RX ADMIN — FAMOTIDINE 20 MG: 20 TABLET ORAL at 09:22

## 2021-04-09 RX ADMIN — FENTANYL CITRATE 100 MCG: 50 INJECTION, SOLUTION INTRAMUSCULAR; INTRAVENOUS at 09:35

## 2021-04-09 RX ADMIN — CEFAZOLIN SODIUM 2 G: 2 SOLUTION INTRAVENOUS at 11:17

## 2021-04-09 RX ADMIN — ROPIVACAINE HYDROCHLORIDE 20 ML: 5 INJECTION, SOLUTION EPIDURAL; INFILTRATION; PERINEURAL at 09:36

## 2021-04-09 NOTE — DISCHARGE INSTRUCTIONS
ORTHOPAEDIC DISCHARGE PLAN     1. DISCHARGE DISPOSITION:  Home    2. FOLLOW UP RETURN TO OFFICE: {Numbers; 1-5:39411} {days/weeks/mos/yrs:785188}    Call [phone] to confirm your appointment to see Dr. Finley Dakins. Zayra Piper MD.   Follow up with Primary Care Provider in 7 to 10 days. 3. WEIGHT BEARING STATUS/ROM:    NO WEIGHT BEARING TO {Weight bearing restriction:15408} to the {Right, left-initial cap:5607} LOWER EXTREMITY    4. ELEVATE the {Right, left-initial cap:5607} lower extremity on 1 pillow. Place the pillow horizontal so that no pressure is on the back of your heel    Please leave your current dressings and in place. Keep your dressings clean and dry to the: {Right, left-initial cap:5607} lower extremity      Please call [phone] ([address]) if any: fever, shakes, chills, intractable pain, or for any questions you have regarding your care/medical condition   1. If you experience any calf pain or swelling, or are having any shortness of  breath, chest pain, or extremity swelling, or bleeding thru any surgical dressings,  or Bleeding at any body location while you are taking on any blood thinners. ie (mouth,nose, skin sites:)   2. Please go to closest ER  ASAP for assessment to rule out a leg clot and to  assess any  bleeding. 3. After general anesthesia or intravenous sedation, for 24 hours or while taking  prescription Narcotics:      [x]  Limit your activities     [x]   Do not drive and operate hazardous machinery    [x]   Do not make important personal or business decisions    [x]   Do  not drink alcoholic beverages    [x]  If you have not urinated within 8 hours after discharge, please contact    your surgeon on call.      Report the following to your surgeon: If any below is true         [x]   Excessive pain, swelling, redness or odor of or around the surgical area       [x]   Temperature over 100.5       [x]  Nausea and vomiting lasting longer than 4 hours or if unable to take medications       [x]    Any signs of decreased circulation or nerve impairment to extremity:    Change in color, persistent  numbness, tingling, coldness or increase pain          [x]  No smoking/ No tobacco products/ Avoid exposure to second hand smoke    5. DIET:  {diet:72513}  OTC (Nutritional supplements/multivitamins/calcium w/ Vitamin  D     6. ACTIVITY: {discharge activity:29884} // No lifting, twisting, squatting, deep bending. 7. INCISION CARE/DRESSINGS: {wound care:23369} ,{Blank Single Select Template:20061::\"Sterile dressings: 4 x 4's/ABD's\",\"Aquacel AG self adhesive\",\"Self Adhesive\",\"Sterile 4 x 4's\",\"ACE Wraps\"}    Keep all pets away from  any wound present in order to prevent infection. 8. PAIN CONTROL: Prescriptions written: {MEDS; ANALGESICS:13399}       Start taking your pain medications when you get home    9. VTE prophylaxis : Start {ANTICOAGULATE:87816041} on date ***.    10. ANTIBIOTICS: {abx:35023}  None at this time    11.  DME/ PRESCRIPTIONS WRITTEN ALREADY WRITTEN:     Home Care Equipment:    *** Walker for home use     *** Crutches for home use     *** 3 in 1 elevated commode seat for home use    ***[unfilled]        Miri Davis MD  4/9/2021  1:14 PM  .Patient {YAEETRDZ:77023} Pt is an 86 yo female with ho COPD on home O2 2L, morbidly obese, DM a/w acute on chronic hypoxic resp failure due to COVID. clinically improving, back to baseline O2 requirement. awaiting evaluation for home BiPAP.

## 2021-04-09 NOTE — BRIEF OP NOTE
Brief Postoperative Note    Patient: Arya Gonzalez  YOB: 1975  MRN: [id number]    Date of Procedure: 4/9/2021     Pre-Op Diagnosis: Closed nondisplaced fracture of fifth metatarsal bone of left foot, initial encounter [S92.355A]  Pain in left foot [M79.672]  Left foot pain [M79.672]    Post-Op Diagnosis: Same as preoperative diagnosis. Procedure(s):  OPEN FIXATION, LEFT FIFTH METATARSAL FRACTURE DISTAL ONE THIRD  26330  ALLOGRAFT BONE GRAFTING 72066  EXTERNAL BONE STIMULATION 53[address] 28/COMPREHENSIVE FRACTURE TRAY SYSTEM/C-ARM/GENERAL W/ BLOCK    Surgeon(s):  Madhav Arias MD    Surgical Assistant: Surg Asst-1: Dov Monday PA:  ASSISTANT    Hermelindo Cortez PA-C, [facility]  was medically necessary for the procedure. She assisted in : patient positioning, surgical incision retraction, placement of hardware, incision closure, placement of DME, and transfer of the patient to the PACU. Her role was critical for the success of this procedure. Anesthesia: General and Regional Block    Estimated Blood Loss (mL): Minimal    IV FLUIDS:  700 ml IVF  Tourniquet Time:  61 minutes at  887  Mm HG     Complications: None    Specimens: * No specimens in log *     Implants:   Implant Name Type Inv. Item Serial No.  Lot No. LRB No. Used Action   IMPLANT OP RM LAPIDUS PLT 5 AND 8MM GRA - SN/A  IMPLANT OP RM LAPIDUS PLT 5 AND 8MM GRA N/A PARAGON 28_WD N/A Left 1 Implanted   SCREW BONE LCK BG 2. [id number] - [id number]  SCREW BONE LCK BG 2. [id number]  PARAGON 28_WD N/A Left 1 Implanted   screw2. 0x12 nonlocking    PARAGON 28 N/A Left 1 Implanted       Drains: * No LDAs found *    Findings:  Comminuted left 5th metatarsal fracture    Electronically Signed by Alma Delia Correa MD on 4/9/2021 at 7:19 AM

## 2021-04-09 NOTE — OP NOTES
Patient: Bam Cody                MRN:@           SSN: [id number]   YOB: 1975         AGE: 39 y.o. SEX: female       OPERATIVE REPORT    Patient: Bam Cody  YOB: 1975  MRN: [id number]    Date of Procedure: 4/9/2021     Pre-Op Diagnosis: Closed nondisplaced fracture of fifth metatarsal bone of left foot, initial encounter [S92.355A]  Pain in left foot [M79.672]  Left foot pain [M79.672]    Post-Op Diagnosis: Same as preoperative diagnosis. Procedure(s):  OPEN FIXATION, LEFT FIFTH METATARSAL FRACTURE DISTAL ONE THIRD  64788  ALLOGRAFT BONE GRAFTING 64835  EXTERNAL BONE STIMULATION 41756  Marleny Sell 28/COMPREHENSIVE FRACTURE TRAY SYSTEM/C-ARM/GENERAL W/ BLOCK    Surgeon(s):  Codi Hanks MD    Surgical Assistant: Surg Asst-1: Romina Lux PA:  ASSISTANT    Lizbeth Vargas PA-C, DHSc  was medically necessary for the procedure. She assisted in : patient positioning, surgical incision retraction, placement of hardware, incision closure, placement of DME, and transfer of the patient to the PACU. Her role was critical for the success of this procedure. Anesthesia: General and Regional Block    Estimated Blood Loss (mL): Minimal    IV FLUIDS:  700 ml IVF  Tourniquet Time:  61 minutes at  497  Mm HG     Complications: None    Specimens: * No specimens in log *     Implants:   Implant Name Type Inv. Item Serial No.  Lot No. LRB No. Used Action   IMPLANT OP RM LAPIDUS PLT 5 AND 8MM GRA - SN/A  IMPLANT OP RM LAPIDUS PLT 5 AND 8MM GRA N/A PARAGON 28_WD N/A Left 1 Implanted   SCREW BONE LCK BG 2. [id number] - [id number]  SCREW BONE LCK BG 2. [id number]  PARAGON 28_WD N/A Left 1 Implanted   screw2. 0x12 nonlocking    PARAGON 28 N/A Left 1 Implanted       Drains: * No LDAs found *    Findings:  Comminuted left 5th metatarsal fracture    Electronically Signed by Adan Farrar MD on 4/9/2021 at 7:19 AM        OPERATIVE REPORT OPERATIVE NOTE:     Martha German  was taken to the operating room today, on 4/9/2021. Martha German was positioned supine, general anesthesia was conducted. A tourniquet was placed to the left upper thigh. Martha German was then positioned supine and a bump was placed underneath her left buttock to gently make sure the foot was in a neutrally position. A formal time-out was conducted identifying correct the patient, correct limb, correct location for surgery, confirmation that he received antibiotics. All members of the surgical team agreed to the surgical time-out. I exsanguinated the right lower extremity, insufflated tourniquet to 300 mmHg and attention was then directed towards the surgery. I made a 6 cm incision was made along the lateral portion of the fifth metatarsal.  My incision made the skin subcutaneous tissues directly down to the fifth metatarsal.  There is interposed fibrous tissue at the fracture site and is a comminuted fracture. I used a needle-nose rongeur, dental pick, to remove the interposed fibrous tissue. I used a lion-jaw bone clamp, to reduce the fifth metatarsal shaft. It was near anatomic alignment. It was then provisionally held with small smooth K wires. Then definitive fixation was conducted, using a Isael Mouse type configuration [place] 28 mini gorilla plate with 2 screws distal fixation, and at least 4 screws proximal.  I lag the fracture, through the plate with 2 lag screws. These are 2.0 Chi screws with a 2.0 mm [place] 28 plate. The clinic was deflated after 61 minutes of insufflation. Selective electrocautery used for hemostasis. Incision was then closed in layers, using 3-0 Vicryl 3 Monocryl, 3-0 nylon.   Sterile dressings were placed with consisted of: Xeroform, 4 x 4's, 4-inch Kerlix , sterile cast padding, and a well-padded posterior splint applied with ankle neutral.      Martha German  was then extubated and transferred to the recovery room in stable condition. No complications. EXTERNAL BONE STIMULATION  68803:   WAS PLACED  In PACU. Cinda Cooks PA:  ASSISTANT    Wallace Nieto PA-C, DHSc  was medically necessary for the procedure. She assisted in : patient positioning, surgical incision retraction, placement of hardware, incision closure, placement of DME, and transfer of the patient to the PACU. Her role was critical for the success of this procedure. MD Nancy Moser MD  4/9/2021  6:21 PM          I spoke with sister:    Juan Manuel Reyes Sister   DISCHARGE CAREGIVER   Address: Work phone:      Mobile phone: [phone]     Email:    Legal guardian?   Health Care Decision Maker:          Nancy Kunz MD  4/9/2021  1:09 PM

## 2021-04-09 NOTE — INTERVAL H&P NOTE
Update History & Physical 
 
The Patient's History and Physical of April 9,  
4/9/2021 10:51 AM   was reviewed with the patient and I examined the patient. There was no change. The surgical site was confirmed by the patient and me. Plan:  The risk, benefits, expected outcome, and alternative to the recommended procedure have been discussed with the patient. Patient understands and wants to proceed with the procedure.  
 
Electronically signed by Sae Adkins MD on 4/9/2021 at 10:51 AM

## 2021-04-09 NOTE — ANESTHESIA PROCEDURE NOTES
Peripheral Block    Start time: 4/9/2021 9:30 AM  End time: 4/9/2021 9:44 AM  Performed by: Matias Moncada MD  Authorized by: Matias Moncada MD       Pre-procedure: Indications: at surgeon's request, post-op pain management and procedure for pain    Preanesthetic Checklist: patient identified, risks and benefits discussed, site marked, timeout performed, anesthesia consent given and patient being monitored      Block Type:   Block Type:  Popliteal  Laterality:  Left  Monitoring:  Standard ASA monitoring, continuous pulse ox, frequent vital sign checks, oxygen, heart rate and responsive to questions  Injection Technique:  Single shot  Procedures: ultrasound guided    Patient Position: supine  Prep: chlorhexidine    Location:  Upper thigh  Needle Type:  Stimuplex  Needle Gauge:  21 G  Needle Localization:  Ultrasound guidance  Medication Injected:  Ropivacaine (PF) (NAROPIN)(0.5%) 5 mg/mL injection, 20 mL    Assessment:  Number of attempts:  1  Injection Assessment:  No paresthesia, incremental injection every 5 mL, ultrasound image on chart, no intravascular symptoms, negative aspiration for blood and local visualized surrounding nerve on ultrasound  Patient tolerance:  Patient tolerated the procedure well with no immediate complications  Location:  PREOP HOLDING    Patient given 2 mg IV Versed and 100 mcg IV Fentanyl for sedation.     4/9/2021     9:44 AM     Geoffrey Guerrero MD

## 2021-04-10 NOTE — ANESTHESIA POSTPROCEDURE EVALUATION
Procedure(s):  OPEN FIXATION, LEFT FIFTH METATARSAL FRACTURE DISTAL ONE THIRD/BONE GRAFTING FIBULA/EXTERNAL BONE STIMULATION/PARAGON 28/COMPREHENSIVE FRACTURE TRAY SYSTEM/C-ARM/GENERAL W/ BLOCK.    general    Anesthesia Post Evaluation      Multimodal analgesia: multimodal analgesia used between 6 hours prior to anesthesia start to PACU discharge  Patient location during evaluation: bedside  Patient participation: complete - patient participated  Level of consciousness: awake  Pain management: adequate  Airway patency: patent  Anesthetic complications: no  Cardiovascular status: stable  Respiratory status: acceptable  Hydration status: acceptable  Post anesthesia nausea and vomiting:  controlled      INITIAL Post-op Vital signs:   Vitals Value Taken Time   /76 04/09/21 1424   Temp 37.1 °C (98.7 °F) 04/09/21 1324   Pulse 99 04/09/21 1425   Resp 14 04/09/21 1425   SpO2 96 % 04/09/21 1425   Vitals shown include unvalidated device data.

## 2021-04-16 ENCOUNTER — OFFICE VISIT (OUTPATIENT)
Dept: ORTHOPEDIC SURGERY | Age: 46
End: 2021-04-16
Payer: COMMERCIAL

## 2021-04-16 VITALS
OXYGEN SATURATION: 97 % | HEART RATE: 115 BPM | RESPIRATION RATE: 16 BRPM | HEIGHT: 70 IN | BODY MASS INDEX: 25.71 KG/M2 | TEMPERATURE: 97.7 F

## 2021-04-16 DIAGNOSIS — G89.18 POST-OPERATIVE PAIN: ICD-10-CM

## 2021-04-16 DIAGNOSIS — Z98.890 POST-OPERATIVE STATE: ICD-10-CM

## 2021-04-16 DIAGNOSIS — M79.672 LEFT FOOT PAIN: ICD-10-CM
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
## 2021-04-16 DIAGNOSIS — S92.352D CLOSED DISPLACED FRACTURE OF FIFTH METATARSAL BONE OF LEFT FOOT WITH ROUTINE HEALING, SUBSEQUENT ENCOUNTER: Primary | ICD-10-CM

## 2021-04-16 PROCEDURE — 73630 X-RAY EXAM OF FOOT: CPT | Performed by: PHYSICIAN ASSISTANT

## 2021-04-16 PROCEDURE — 99024 POSTOP FOLLOW-UP VISIT: CPT | Performed by: PHYSICIAN ASSISTANT

## 2021-04-16 RX ORDER — HYDROCODONE BITARTRATE AND ACETAMINOPHEN 7.5; 325 MG/1; MG/1
1-2 TABLET ORAL
Qty: 42 TAB | Refills: 0 | Status: SHIPPED | OUTPATIENT
Start: 2021-04-16 | End: 2021-04-23

## 2021-04-16 NOTE — PATIENT INSTRUCTIONS
· Continue activity modification · Weight bearing status:  no weight bearing to the surgical extremity · No lifting, twisting, squatting, deep bending, driving or strenuous activity. · Please follow up as instructed · Please take medication as directed · Follow RICE protocol (Rest, Ice, Compression, Elevation). Please a covering over skin for protection · Maintain proper Nutrition · Take a multivitamin, calcium + Vitamin D supplement daily (if tolerated) · If you are a current smoker, quit or limit smoking · Keep the current dressings on and in place. You need to keep this incision clean and dry. If you have a splint or cast on please keep this clean and dry as well. · You should expect swelling and bruising in the area over the next several days. You may elevate the surgical extremity on 1 pillow. Place the pillow horizontal so that no pressure is on the back of your heel. You may apply an icepack on top of the dressing to help minimize the swelling. PLEASE SEEK IMMEDIATE ASSESSMENT BY ER PHYSICIAN IF ANY OF THE FOLLOWING EXIST:  
 
 Excessive pain, swelling, redness or odor of or around the surgical area  Temperature over 100.5  Nausea and vomiting lasting longer than 4 hours or if unable to take medications  Any signs of decreased circulation or nerve impairment to extremity: change in color, persistent numbness, tingling, coldness or increase pain  If any calf pain, calf tightness, shortness of breath, chest pain  Any difficulty breathing at rest or with ambulation, any chest tightness/soreness  Severe intractable pain, persistent swelling or drainage, development of a wound, incisional redness, finger/toe swelling or color changes, or CALF PAIN 
 
· Perform ankle pumps with non-surgical/non-injured extremity to help reduce the risk of blood clots · Keep all pets away from  any wound present in order to prevent infection · If you a reminder for a \"due or due soon\" health maintenance, please contact your primary care provider for follow-up on this health maintenance Acute Pain After Surgery: Care Instructions Your Care Instructions It's common to have some pain after surgery. Pain doesn't mean that something is wrong or that the surgery didn't go well. But when the pain is severe, it's important to work with your doctor to manage it. It's also important to be aware of a few facts about pain and pain medicine. · You are the only person who knows what your pain feels like. So be sure to tell your doctor when you are in pain or when the pain changes. Then he or she will know how to adjust your medicines. · Pain is often easier to control right after it starts. So it may be better to take regular doses of pain medicine and not wait until the pain gets bad. · Medicine can help control pain. But this doesn't mean you'll have no pain. Medicine works to keep the pain at a level you can live with. With time, you will feel better. Follow-up care is a key part of your treatment and safety. Be sure to make and go to all appointments, and call your doctor if you are having problems. It's also a good idea to know your test results and keep a list of the medicines you take. How can you care for yourself at home? · Be safe with medicines. Read and follow all instructions on the label. ? If the doctor gave you a prescription medicine for pain, take it as prescribed. ? If you are not taking a prescription pain medicine, ask your doctor if you can take an over-the-counter medicine. · If you take an over-the-counter pain medicine, such as acetaminophen (Tylenol), ibuprofen (Advil, Motrin), or naproxen (Aleve), read and follow all instructions on the label. · Do not take two or more pain medicines at the same time unless the doctor told you to. · Do not drink alcohol while you are taking pain medicines. · Try to walk each day if your doctor recommends it. Start by walking a little more than you did the day before. Bit by bit, increase the amount you walk. Walking increases blood flow. It also helps prevent pneumonia and constipation. · To prevent constipation from opioid pain medicines: 
? Talk to your doctor about a laxative. ? Include fruits, vegetables, beans, and whole grains in your diet each day. These foods are high in fiber. ? Drink plenty of fluids, enough so that your urine is light yellow or clear like water. Drink water, fruit juice, or other drinks that do not contain caffeine or alcohol. If you have kidney, heart, or liver disease and have to limit fluids, talk with your doctor before you increase the amount of fluids you drink. ? Take a fiber supplement, such as Citrucel or Metamucil, every day if needed. Read and follow all instructions on the label. If you take pain medicine for more than a few days, talk to your doctor before you take fiber.

## 2021-04-16 NOTE — PROGRESS NOTES
Cira Subramanian (1975) is a 39 y.o. female, established patient, 7 days s/p Open Fixation, Left Fifth Metatarsal Fracture Distal One Third/bone Grafting Fibula/external Bone Stimulation/paragon 28/comprehensive Fracture Tray System/c-arm/general W/ Block - Left completed on 4/9/2021, and she is here for evaluation of the following chief complaint(s):    Chief Complaint   Patient presents with    Foot Pain     left foot pain          ASSESSMENT & PLAN:     Diagnoses and all orders for this visit:    1. Closed displaced fracture of fifth metatarsal bone of left foot with routine healing, subsequent encounter  -     AMB POC XRAY, FOOT; COMPLETE, 3+ VIEW  -     HYDROcodone-acetaminophen (NORCO) 7.5-325 mg per tablet; Take 1-2 Tabs by mouth every eight (8) hours as needed for Pain (After surgery) for up to 7 days. Max Daily Amount: 6 Tabs. 2. Fall, initial encounter  -     AMB POC XRAY, FOOT; COMPLETE, 3+ VIEW    3. Left foot pain  -     AMB POC XRAY, FOOT; COMPLETE, 3+ VIEW  -     HYDROcodone-acetaminophen (NORCO) 7.5-325 mg per tablet; Take 1-2 Tabs by mouth every eight (8) hours as needed for Pain (After surgery) for up to 7 days. Max Daily Amount: 6 Tabs. 4. Post-operative state  -     AMB POC XRAY, FOOT; COMPLETE, 3+ VIEW  -     HYDROcodone-acetaminophen (NORCO) 7.5-325 mg per tablet; Take 1-2 Tabs by mouth every eight (8) hours as needed for Pain (After surgery) for up to 7 days. Max Daily Amount: 6 Tabs. 5. Post-operative pain  -     HYDROcodone-acetaminophen (NORCO) 7.5-325 mg per tablet; Take 1-2 Tabs by mouth every eight (8) hours as needed for Pain (After surgery) for up to 7 days. Max Daily Amount: 6 Tabs. ICD-10-CM ICD-9-CM   1. Closed displaced fracture of fifth metatarsal bone of left foot with routine healing, subsequent encounter  S92.352D V54.19   2. Fall, initial encounter  [address] XXXA E888.9   3. Left foot pain  M79.672 729.5   4.  Post-operative state  Z98.890 V45.89   5. Post-operative pain  G89.18 338.18       Patient Instructions               · Continue activity modification    · Weight bearing status:  no weight bearing to the surgical extremity    · No lifting, twisting, squatting, deep bending, driving or strenuous activity. · Please follow up as instructed    · Please take medication as directed    · Follow RICE protocol (Rest, Ice, Compression, Elevation). Please a covering over skin for protection     · Maintain proper Nutrition    · Take a multivitamin, calcium + Vitamin D supplement daily (if tolerated)    · If you are a current smoker, quit or limit smoking    · Keep the current dressings on and in place. You need to keep this incision clean and dry. If you have a splint or cast on please keep this clean and dry as well. · You should expect swelling and bruising in the area over the next several days. You may elevate the surgical extremity on 1 pillow. Place the pillow horizontal so that no pressure is on the back of your heel. You may apply an icepack on top of the dressing to help minimize the swelling.      PLEASE SEEK IMMEDIATE ASSESSMENT BY ER PHYSICIAN IF ANY OF THE FOLLOWING EXIST:      Excessive pain, swelling, redness or odor of or around the surgical area    Temperature over 100.5    Nausea and vomiting lasting longer than 4 hours or if unable to take medications    Any signs of decreased circulation or nerve impairment to extremity: change in color, persistent numbness, tingling, coldness or increase pain    If any calf pain, calf tightness, shortness of breath, chest pain    Any difficulty breathing at rest or with ambulation, any chest tightness/soreness   Severe intractable pain, persistent swelling or drainage, development of a wound, incisional redness, finger/toe swelling or color changes, or CALF PAIN    · Perform ankle pumps with non-surgical/non-injured extremity to help reduce the risk of blood clots    · Keep all pets away from  any wound present in order to prevent infection    · If you a reminder for a \"due or due soon\" health maintenance, please contact your primary care provider for follow-up on this health maintenance    Acute Pain After Surgery: Care Instructions  Your Care Instructions    It's common to have some pain after surgery. Pain doesn't mean that something is wrong or that the surgery didn't go well. But when the pain is severe, it's important to work with your doctor to manage it. It's also important to be aware of a few facts about pain and pain medicine. · You are the only person who knows what your pain feels like. So be sure to tell your doctor when you are in pain or when the pain changes. Then he or she will know how to adjust your medicines. · Pain is often easier to control right after it starts. So it may be better to take regular doses of pain medicine and not wait until the pain gets bad. · Medicine can help control pain. But this doesn't mean you'll have no pain. Medicine works to keep the pain at a level you can live with. With time, you will feel better. Follow-up care is a key part of your treatment and safety. Be sure to make and go to all appointments, and call your doctor if you are having problems. It's also a good idea to know your test results and keep a list of the medicines you take. How can you care for yourself at home? · Be safe with medicines. Read and follow all instructions on the label. ? If the doctor gave you a prescription medicine for pain, take it as prescribed. ? If you are not taking a prescription pain medicine, ask your doctor if you can take an over-the-counter medicine. · If you take an over-the-counter pain medicine, such as acetaminophen (Tylenol), ibuprofen (Advil, Motrin), or naproxen (Aleve), read and follow all instructions on the label. · Do not take two or more pain medicines at the same time unless the doctor told you to.   · Do not drink alcohol while you are taking pain medicines. · Try to walk each day if your doctor recommends it. Start by walking a little more than you did the day before. Bit by bit, increase the amount you walk. Walking increases blood flow. It also helps prevent pneumonia and constipation. · To prevent constipation from opioid pain medicines:  ? Talk to your doctor about a laxative. ? Include fruits, vegetables, beans, and whole grains in your diet each day. These foods are high in fiber. ? Drink plenty of fluids, enough so that your urine is light yellow or clear like water. Drink water, fruit juice, or other drinks that do not contain caffeine or alcohol. If you have kidney, heart, or liver disease and have to limit fluids, talk with your doctor before you increase the amount of fluids you drink. ? Take a fiber supplement, such as Citrucel or Metamucil, every day if needed. Read and follow all instructions on the label. If you take pain medicine for more than a few days, talk to your doctor before you take fiber. Follow-up and Dispositions    · Return in about 2 weeks (around 4/30/2021) for follow up evaluation. Dr. Myah Meyer has been consulted regarding the patient during this visit and he agrees with the assessment and plan    Patient expresses understanding of the diagnosis and treatment plan. Patient education has been provided. Jose Davis may have a reminder for a \"due or due soon\" health maintenance. I have asked that she contact her primary care provider for follow-up on this health maintenance.  was reviewed by Gilles Henriquez PA-C on 4/16/2021. SUBJECTIVE & OBJECTIVE:     HPI    Since last seen in the office, the patient reports that she is doing well other than sustaining a fall 7 days again. Patient states that she tripped over the threshold going to the bathroom and sustained a fall.  She states that she did not fall on her left foot, but sustained some bruising to her arms and anterior lower leg. Patient denies any fever, chills, CP, SOB or calf pain. The patient denies any no new illness or injuries to report since last seen in the office. There are no reported changes in medications, allergies, social or family history. The patient is on ASA for DVT prophylaxis. Emeterio Mills has been experiencing pain, discomfort and associated symptoms and has tried modalities of treatment and/or self treatment confirmed as outlined in the pain assessment below. Pain Assessment  4/16/2021   Location of Pain Foot   Pain Location Comment -   Location Modifiers Left   Severity of Pain 6   Quality of Pain Throbbing; Sharp   Quality of Pain Comment swelling   Duration of Pain Persistent   Frequency of Pain Constant   Aggravating Factors (No Data)   Aggravating Factors Comment NWB   Limiting Behavior -   Relieving Factors (No Data)   Relieving Factors Comment pain maedication given during surgery. Result of Injury No   Work-Related Injury -   Type of Injury -          Emeterio Mills  has a past medical history of ADHD, Anxiety, Body piercing, IBS (irritable bowel syndrome), IUD (intrauterine device) in place, and Sleep disorder. Other than previously noted, patient reports no changes in medications, allergies, social or family history. REVIEW OF SYSTEMS:                  All Below are Negative except as indicated in the HPI: See HPI                Constitutional: negative for fever, chills, night sweats and unexpected weight loss and malaise/fatigue              HEENT: Negative. No hoarseness, no double vision              Respiratory: Negative.  No difficulty breathing, No SOB              Cardiovascular: negative for chest pain, claudication, QUINTERO. (-) Leg swelling               Gastrointestinal: Negative for pain, Blood in stool, incontinence, pelvic pain, N/V/C/D              Genitourinary: No saddle anesthesia, pelvic pain, blood in urine, incontinence, dysuria               Neurological: Negative for dizziness and weakness, visual changes, confusion, headaches, seizures               Phychiatric/Behavioral: Negative for depression, memory loss, substance abuse               Extremities: Negative for hair changes, rash, or skin lesion changes              Hematologic: Negative for bleeding problems, bruising, pallor or swollen lymph nodes              Peripheral Vascular: No calf pain, no circulation deficits              Musculoskeletal: As per HPI above      PHYSICAL EXAM:        Visit Vitals  Pulse (!) 115   Temp 97.7 °F (36.5 °C) (Temporal)   Resp 16   Ht 5' 10\" (1.778 m)   SpO2 97%   BMI 25.71 kg/m²         Constitutional: [x] Alert, cooperative, appears well-developed and well-nourished [x] No apparent distress      [] Abnormal - Body mass index is 25.71 kg/m². makes the treatment plan more complex     Mental status: [x] Alert and awake  [x] Oriented to person/place/time   [x] Normal mood/behavior/speech   [x] Normal dress/motor activity/thought processes/memory      [x] Able to follow commands    [] Abnormal - Dementia    Eyes:   EOM    [x]  Normal    [] Abnormal -   Sclera  [x]  Normal    [] Abnormal -          Discharge [x]  None visible   [] Abnormal -     HENT: [x] Normocephalic, atraumatic  [] Abnormal -   [x] Mouth/Throat: Mucous membranes are moist    External Ears [x] Normal, hearing intact  [] Abnormal -    Neck: [x] Supple.  No visualized mass, normal ROM [] Abnormal -     Pulmonary/Chest: [x] Respiratory effort normal   [x] No visualized signs of difficulty breathing or respiratory distress        [] Abnormal -        Cardiovascular/    [x] Normal pulses to each foot  [] Abnormal -   Peripheral Vascular:    Neurological:        [x] No Facial Asymmetry (Cranial nerve 7 motor function) (limited exam due to video visit) [x] No gross defects         [x] No gaze palsy        [] Abnormal -          Skin:        [x] No significant exanthematous lesions or discoloration noted on facial skin or visible areas       [] Abnormal -            Psychiatric:       [x] Normal Affect, mood, judgement, behavior, conduct [] Abnormal -        [x] No Hallucinations      Musculoskeletal:   [x] Normal gait with no signs of ataxia         [x] Normal range of motion of neck        [] Abnormal -       MUSCULOSKELETAL: EXAMINATION OF:     ANKLE/FOOT left    DRESSINGS: CDI   DRAINAGE: pin point due to ASA. No active drainage   INCISION: Incision looks good, skin well approximated, no dehiscence, nylon sutures in place without disruption. SKIN: mild edema, no erythema, no ecchymosis, no warmth. No rash or skin lesions. No signs of infection or cellulitis present. TENDERNESS:  Mild tenderness to palpation fifth metatarsal  NEUROVASCULAR:  Grossly intact. Motor/Sensory: NL/NL. Positive distal pulses and capillary refill. DVT ASSESSMENT:  The calf is not tender to palpation. No evidence of DVT seen on physical exam.  LYMPHATICS: No extremity lymphedema, No calf swelling, no tenderness to calf muscles  JOINT MOTION: Not tested . There is pain-free range of motion of adjacent joints. Negative joint effusion  JOINT/TENDON STABILITY: No Ankle or Subtalar instability or joint laxity. No peroneal sublux ability or dislocation  ALIGNMENT: Forefoot, Midfoot, Hindfoot WNL. DEFORMITY: not present       An electronic signature was used to authenticate this note. -- Sylvia Ross. [facility], HILARY LUISC  4/16/2021      Disclaimer: Sections of this note may have been dictated utilizing voice recognition software, which may have resulted in some phonetic based errors in grammar and contents. Even though attempts were made to correct all the mistakes, some may have been missed, and remained in the body of the document. If questions arise, please contact our department.        RADIOGRAPHS & DIAGNOSTIC STUDIES      Left foot X-rays, 3 views, AP/LAT/OBL completed 4/16/2021 AT [facility]      FINDINGS:    X-rays reveal post op changes s/p Open Fixation, Left Fifth Metatarsal Fracture Distal One Third/bone Grafting Fibula/external Bone Stimulation/paragon 28/comprehensive Fracture Tray System/c-arm/general W/ Block - Left. Overall alignment is acceptable. Hardware is in good position with no indication of movement or failure. Soft tissue swelling is mild. Degenerative changes are noted. Mineralization suggests no osteopenia. Calcified vessels are not present. IMPRESSION:    As stated above      I have personally reviewed the results of the above study.  The interpretation of this study is my professional opinion    4/16/2021  Citlaly Brand PA-C

## 2021-04-28 DIAGNOSIS — G89.18 POST-OPERATIVE PAIN: Primary | ICD-10-CM

## 2021-04-28 DIAGNOSIS — S92.352D CLOSED DISPLACED FRACTURE OF FIFTH METATARSAL BONE OF LEFT FOOT WITH ROUTINE HEALING, SUBSEQUENT ENCOUNTER: ICD-10-CM

## 2021-04-28 DIAGNOSIS — Z98.890 POST-OPERATIVE STATE: ICD-10-CM

## 2021-04-28 DIAGNOSIS — M79.672 LEFT FOOT PAIN: ICD-10-CM

## 2021-04-28 RX ORDER — HYDROCODONE BITARTRATE AND ACETAMINOPHEN 7.5; 325 MG/1; MG/1
1-2 TABLET ORAL
Qty: 42 TAB | Refills: 0 | Status: SHIPPED | OUTPATIENT
Start: 2021-04-28 | End: 2021-05-05

## 2021-04-28 NOTE — TELEPHONE ENCOUNTER
[facility]  reports the last [facility] date for Norco as 4/16/21 for a 7 d/s. Last Visit: 4/16/21 with RICK Grissom  Next Appointment: 4/30/21 with RICK Grissom  Previous Refill Encounter(s): 4/16/21 #42    Requested Prescriptions     Pending Prescriptions Disp Refills    HYDROcodone-acetaminophen (NORCO) 7.5-325 mg per tablet 42 Tab 0     Sig: Take 1-2 Tabs by mouth every eight (8) hours as needed for Pain for up to 7 days. Max Daily Amount: 6 Tabs.

## 2021-04-28 NOTE — TELEPHONE ENCOUNTER
The following prescriptions have been e-prescribed to the patients [address] located on [facility]:    Orders Placed This Encounter    HYDROcodone-acetaminophen (NORCO) 7.5-325 mg per tablet     Sig: Take 1-2 Tabs by mouth every eight (8) hours as needed for Pain for up to 7 days. Max Daily Amount: 6 Tabs. Dispense:  42 Tab     Refill:  0           Ro Hagan [facility], JANELLE, PA-C  4/28/2021  2:31 PM

## 2021-04-30 ENCOUNTER — TELEPHONE (OUTPATIENT)
Dept: ORTHOPEDIC SURGERY | Age: 46
End: 2021-04-30

## 2021-04-30 ENCOUNTER — OFFICE VISIT (OUTPATIENT)
Dept: ORTHOPEDIC SURGERY | Age: 46
End: 2021-04-30
Payer: COMMERCIAL

## 2021-04-30 DIAGNOSIS — S92.352D CLOSED DISPLACED FRACTURE OF FIFTH METATARSAL BONE OF LEFT FOOT WITH ROUTINE HEALING, SUBSEQUENT ENCOUNTER: Primary | ICD-10-CM

## 2021-04-30 DIAGNOSIS — G89.18 POST-OPERATIVE PAIN: ICD-10-CM

## 2021-04-30 PROCEDURE — 73630 X-RAY EXAM OF FOOT: CPT | Performed by: PHYSICIAN ASSISTANT

## 2021-04-30 PROCEDURE — 99024 POSTOP FOLLOW-UP VISIT: CPT | Performed by: PHYSICIAN ASSISTANT

## 2021-04-30 NOTE — TELEPHONE ENCOUNTER
[place] [facility] this [place].   Awaiting status for medication approval/denial.

## 2021-04-30 NOTE — PROGRESS NOTES
Danelle Alanis (1975) is a 39 y.o. female, established patient, 21 days s/p Open Fixation, Left Fifth Metatarsal Fracture Distal One Third/bone Grafting Fibula/external Bone Stimulation/paragon 28/comprehensive Fracture Tray System/c-arm/general W/ Block - Left completed on 4/9/2021, and she is here for evaluation of the following chief complaint(s):    Chief Complaint   Patient presents with    Surgical Follow-up     left foot          ASSESSMENT & PLAN:     Diagnoses and all orders for this visit:    1. Closed displaced fracture of fifth metatarsal bone of left foot with routine healing, subsequent encounter  -     AMB POC XRAY, FOOT; COMPLETE, 3+ VIEW    2. Post-operative pain  -     AMB POC XRAY, FOOT; COMPLETE, 3+ VIEW           ICD-10-CM ICD-9-CM   1. Closed displaced fracture of fifth metatarsal bone of left foot with routine healing, subsequent encounter  S92.352D V54.19   2. Post-operative pain  G89.18 338.18       Patient Instructions                    Your staples or sutures were removed in the office today     You may shower in 3 days, NO soaking and no submerging in ANY water (no bath tubs, pools, hot tubs, oceans, etc)     Do not pick at your skin or the incision. Allow the incision to air when at rest (away from pets).  Pat dry after shower, no aggressive scrubbing or manipulation of the incision     Apply a gentle moisturizer with no fragrance (such as plain Vaseline) twice daily      No weightbearing on the affected extremity as instructed. You may apply weight on the heel of the affected lower extremity in the CAM boot using crutches or walker.  Cover incision with large band aid or clean sock when wearing CAM boot to prevent irritation or friction against the incision    · Follow up as instructed or sooner as needed        If you have a reminder for a \"due or due soon\" health maintenance, please contact your primary care provider for follow-up on this health maintenance. Ro Franks [facility], THONY LUIS  4/30/2021  2:13 PM        Follow-up and Dispositions    · Return in about 3 weeks (around 5/21/2021) for follow up evaluation. Dr. Francesca Teixeira has been consulted regarding the patient during this visit and he agrees with the assessment and plan    Patient expresses understanding of the diagnosis and treatment plan. Patient education has been provided. Emeterio Mills may have a reminder for a \"due or due soon\" health maintenance. I have asked that she contact her primary care provider for follow-up on this health maintenance.  was reviewed by Nithya Eli PA-C on 4/30/2021. SUBJECTIVE & OBJECTIVE:     HPI    Since last seen in the office, the patient reports that she is doing well other than some night time pain along her incision and to the plantar foot. Patient denies any fever, chills, CP, SOB or calf pain. The patient denies any no new illness or injuries to report since last seen in the office. There are no reported changes in medications, allergies, social or family history. The patient is on ASA for DVT prophylaxis. Emeterio Mills has been experiencing pain, discomfort and associated symptoms and has tried modalities of treatment and/or self treatment confirmed as outlined in the pain assessment below. Pain Assessment  4/16/2021   Location of Pain Foot   Pain Location Comment -   Location Modifiers Left   Severity of Pain 6   Quality of Pain Throbbing; Sharp   Quality of Pain Comment swelling   Duration of Pain Persistent   Frequency of Pain Constant   Aggravating Factors (No Data)   Aggravating Factors Comment NWB   Limiting Behavior -   Relieving Factors (No Data)   Relieving Factors Comment pain maedication given during surgery.    Result of Injury No   Work-Related Injury -   Type of Injury -          Emeterio Mills  has a past medical history of ADHD, Anxiety, Body piercing, IBS (irritable bowel syndrome), IUD (intrauterine device) in place, and Sleep disorder. Other than previously noted, patient reports no changes in medications, allergies, social or family history. REVIEW OF SYSTEMS:                  All Below are Negative except as indicated in the HPI: See HPI                Constitutional: negative for fever, chills, night sweats and unexpected weight loss and malaise/fatigue              HEENT: Negative. No hoarseness, no double vision              Respiratory: Negative. No difficulty breathing, No SOB              Cardiovascular: negative for chest pain, claudication, QUINTERO. (-) Leg swelling               Gastrointestinal: Negative for pain, Blood in stool, incontinence, pelvic pain, N/V/C/D              Genitourinary: No saddle anesthesia, pelvic pain, blood in urine, incontinence, dysuria               Neurological: Negative for dizziness and weakness, visual changes, confusion, headaches, seizures               Phychiatric/Behavioral: Negative for depression, memory loss, substance abuse               Extremities: Negative for hair changes, rash, or skin lesion changes              Hematologic: Negative for bleeding problems, bruising, pallor or swollen lymph nodes              Peripheral Vascular: No calf pain, no circulation deficits              Musculoskeletal: As per HPI above      PHYSICAL EXAM:        There were no vitals taken for this visit.       Constitutional: [x] Alert, cooperative, appears well-developed and well-nourished [x] No apparent distress      [] Abnormal - There is no height or weight on file to calculate BMI. makes the treatment plan more complex     Mental status: [x] Alert and awake  [x] Oriented to person/place/time   [x] Normal mood/behavior/speech   [x] Normal dress/motor activity/thought processes/memory      [x] Able to follow commands    [] Abnormal - Dementia    Eyes:   EOM    [x]  Normal    [] Abnormal -   Sclera  [x]  Normal    [] Abnormal -          Discharge [x]  None visible   [] Abnormal - HENT: [x] Normocephalic, atraumatic  [] Abnormal -   [x] Mouth/Throat: Mucous membranes are moist    External Ears [x] Normal, hearing intact  [] Abnormal -    Neck: [x] Supple. No visualized mass, normal ROM [] Abnormal -     Pulmonary/Chest: [x] Respiratory effort normal   [x] No visualized signs of difficulty breathing or respiratory distress        [] Abnormal -        Cardiovascular/    [x] Normal pulses to each foot  [] Abnormal -   Peripheral Vascular:    Neurological:        [x] No Facial Asymmetry (Cranial nerve 7 motor function) (limited exam due to video visit) [x] No gross defects         [x] No gaze palsy        [] Abnormal -          Skin:        [x] No significant exanthematous lesions or discoloration noted on facial skin or visible areas       [] Abnormal -            Psychiatric:       [x] Normal Affect, mood, judgement, behavior, conduct [] Abnormal -        [x] No Hallucinations      Musculoskeletal:   [x] Normal gait with no signs of ataxia         [x] Normal range of motion of neck        [] Abnormal -       MUSCULOSKELETAL: EXAMINATION OF:     ANKLE/FOOT left    DRESSINGS: CDI   DRAINAGE: No active drainage   INCISION: Incision looks good, skin well approximated, no dehiscence, nylon sutures in place without disruption. SKIN: mild edema, no erythema, no ecchymosis, no warmth. No rash or skin lesions. No signs of infection or cellulitis present. TENDERNESS:  Mild tenderness to palpation fifth metatarsal and plantar foot  NEUROVASCULAR:  Grossly intact. Motor/Sensory: NL/NL. Positive distal pulses and capillary refill. DVT ASSESSMENT:  The calf is not tender to palpation. No evidence of DVT seen on physical exam.  LYMPHATICS: No extremity lymphedema, No calf swelling, no tenderness to calf muscles  JOINT MOTION: Not tested . There is pain-free range of motion of adjacent joints. Negative joint effusion  JOINT/TENDON STABILITY: No Ankle or Subtalar instability or joint laxity.  No peroneal sublux ability or dislocation  ALIGNMENT: Forefoot, Midfoot, Hindfoot WNL. DEFORMITY: not present       An electronic signature was used to authenticate this note. -- Karel Garcia. Nick Villanueva [facility], THONY LUIS  4/30/2021      Disclaimer: Sections of this note may have been dictated utilizing voice recognition software, which may have resulted in some phonetic based errors in grammar and contents. Even though attempts were made to correct all the mistakes, some may have been missed, and remained in the body of the document. If questions arise, please contact our department. RADIOGRAPHS & DIAGNOSTIC STUDIES      Left foot X-rays, 3 views, AP/LAT/OBL completed 4/30/2021 AT [facility]      FINDINGS:    X-rays reveal post op changes s/p Open Fixation, Left Fifth Metatarsal Fracture Distal One Third/bone Grafting Fibula/external Bone Stimulation/paragon 28/comprehensive Fracture Tray System/c-arm/general W/ Block - Left. Overall alignment is acceptable. Hardware is in good position with no indication of movement or failure. Soft tissue swelling is mild. Degenerative changes are noted. Mineralization suggests no osteopenia. Calcified vessels are not present. IMPRESSION:    As stated above      I have personally reviewed the results of the above study.  The interpretation of this study is my professional opinion    4/30/2021  Jacy George PA-C

## 2021-04-30 NOTE — PATIENT INSTRUCTIONS
 Your staples or sutures were removed in the office today  You may shower in 3 days, NO soaking and no submerging in ANY water (no bath tubs, pools, hot tubs, oceans, etc)  Do not pick at your skin or the incision. Allow the incision to air when at rest (away from pets).  Pat dry after shower, no aggressive scrubbing or manipulation of the incision  Apply a gentle moisturizer with no fragrance (such as plain Vaseline) twice daily  No weightbearing on the affected extremity as instructed. You may apply weight on the heel of the affected lower extremity in the CAM boot using crutches or walker. Cover incision with large band aid or clean sock when wearing CAM boot to prevent irritation or friction against the incision · Follow up as instructed or sooner as needed If you have a reminder for a \"due or due soon\" health maintenance, please contact your primary care provider for follow-up on this health maintenance. Ro Hagan [facility], JANELLE, PA-C 
4/30/2021 
2:13 PM

## 2021-04-30 NOTE — TELEPHONE ENCOUNTER
PA is required for [facility]/Apap    Cover My Meds Key:  Nikolai Robles Ribeiro: [id number] - Rx #: [id number]

## 2021-05-21 ENCOUNTER — OFFICE VISIT (OUTPATIENT)
Dept: ORTHOPEDIC SURGERY | Age: 46
End: 2021-05-21
Payer: COMMERCIAL

## 2021-05-21 VITALS
BODY MASS INDEX: 26.14 KG/M2 | HEIGHT: 70 IN | TEMPERATURE: 96.7 F | OXYGEN SATURATION: 97 % | HEART RATE: 116 BPM | WEIGHT: 182.6 LBS

## 2021-05-21 DIAGNOSIS — S92.352D CLOSED DISPLACED FRACTURE OF FIFTH METATARSAL BONE OF LEFT FOOT WITH ROUTINE HEALING, SUBSEQUENT ENCOUNTER: Primary | ICD-10-CM

## 2021-05-21 PROCEDURE — 73630 X-RAY EXAM OF FOOT: CPT | Performed by: PHYSICIAN ASSISTANT

## 2021-05-21 PROCEDURE — 99024 POSTOP FOLLOW-UP VISIT: CPT | Performed by: PHYSICIAN ASSISTANT

## 2021-05-21 RX ORDER — AVOBENZONE, OCTOCRYLENE, AND OXYBENZONE 30; 100; 60 MG/G; MG/G; MG/G
CREAM TOPICAL
Qty: 30 G | Refills: 1 | Status: SHIPPED | OUTPATIENT
Start: 2021-05-21

## 2021-05-21 NOTE — PATIENT INSTRUCTIONS
· Continue activity modification. Weightbearing as tolerated in the CAM boot. Use the Tubigrip for swelling · Follow RICE protocol: Rest, Ice (not directly on the skin) and Elevate · Take medication as directed, (if tolerated) · Take Prilosec or Pepcid while taking any antiinflammatory (if tolerated) medication · Maintain proper nutrition · Take a multivitamin, calcium + Vitamin D supplement daily (if tolerated) · Tubigrip provided · Please follow up as instructed or sooner as needed If you have a reminder for a \"due or due soon\" health maintenance, please contact your primary care provider for follow-up on this health maintenance. Ro Tom [facility], MPA, PA-C 
5/21/2021 
2:13 PM

## 2021-05-21 NOTE — PROGRESS NOTES
García Rider (1975) is a 39 y.o. female, established patient, 42 days s/p Open Fixation, Left Fifth Metatarsal Fracture Distal One Third/bone Grafting Fibula/external Bone Stimulation/paragon 28/comprehensive Fracture Tray System/c-arm/general W/ Block - Left completed on 4/9/2021, and she is here for evaluation of the following chief complaint(s):    Chief Complaint   Patient presents with    Surgical Follow-up     post-op    Foot Pain     left          ASSESSMENT & PLAN:     Diagnoses and all orders for this visit:    1. Closed displaced fracture of fifth metatarsal bone of left foot with routine healing, subsequent encounter  -     AMB POC XRAY, FOOT; COMPLETE, 3+ VIEW    Other orders  -     Emoll Comb. No.46-Sunscreen (Mederma) 30 SPF crea; APPLY TO AFFECTED AREA THREE (3) TIMES PER DAY FOR 8 WEEKS           ICD-10-CM ICD-9-CM   1. Closed displaced fracture of fifth metatarsal bone of left foot with routine healing, subsequent encounter  S92.352D V54.19       Patient Instructions                  · Continue activity modification. Weightbearing as tolerated in the CAM boot. Use the Tubigrip for swelling  · Follow RICE protocol: Rest, Ice (not directly on the skin) and Elevate   · Take medication as directed, (if tolerated)  · Take Prilosec or Pepcid while taking any antiinflammatory (if tolerated) medication  · Maintain proper nutrition   · Take a multivitamin, calcium + Vitamin D supplement daily (if tolerated)  · Tubigrip provided  · Please follow up as instructed or sooner as needed          If you have a reminder for a \"due or due soon\" health maintenance, please contact your primary care provider for follow-up on this health maintenance. Ro Garcia [facility], MPA, PA-C  5/21/2021  2:13 PM        Follow-up and Dispositions    · Return in about 3 weeks (around 6/11/2021).           Dr. Britany Sutton has been consulted regarding the patient during this visit and he agrees with the assessment and plan    Patient expresses understanding of the diagnosis and treatment plan. Patient education has been provided. Moe Welsh may have a reminder for a \"due or due soon\" health maintenance. I have asked that she contact her primary care provider for follow-up on this health maintenance.  was reviewed by Sravanthi Hill PA-C on 5/21/2021. SUBJECTIVE & OBJECTIVE:     HPI    Since last seen in the office, the patient reports that she is doing much better with pain only to the great toe. Otherwise, she states that she has very little pain to the fifth metatarsal and she feels much better. She states that she has applied some weight in the CAM boot. Patient denies any fever, chills, CP, SOB or calf pain. The patient denies any no new illness or injuries to report since last seen in the office. There are no reported changes in medications, allergies, social or family history. The patient is on ASA for DVT prophylaxis. Moe Welsh has been experiencing pain, discomfort and associated symptoms and has tried modalities of treatment and/or self treatment confirmed as outlined in the pain assessment below. Pain Assessment  5/21/2021   Location of Pain Foot   Pain Location Comment -   Location Modifiers Left   Severity of Pain 0   Quality of Pain -   Quality of Pain Comment -   Duration of Pain -   Frequency of Pain -   Aggravating Factors -   Aggravating Factors Comment -   Limiting Behavior -   Relieving Factors -   Relieving Factors Comment -   Result of Injury -   Work-Related Injury -   Type of Injury -          Moe Welsh  has a past medical history of ADHD, Anxiety, Body piercing, IBS (irritable bowel syndrome), IUD (intrauterine device) in place, and Sleep disorder. Other than previously noted, patient reports no changes in medications, allergies, social or family history.        REVIEW OF SYSTEMS:                  All Below are Negative except as indicated in the HPI: See HPI Constitutional: negative for fever, chills, night sweats and unexpected weight loss and malaise/fatigue              HEENT: Negative. No hoarseness, no double vision              Respiratory: Negative. No difficulty breathing, No SOB              Cardiovascular: negative for chest pain, claudication, QUINTERO. (-) Leg swelling               Gastrointestinal: Negative for pain, Blood in stool, incontinence, pelvic pain, N/V/C/D              Genitourinary: No saddle anesthesia, pelvic pain, blood in urine, incontinence, dysuria               Neurological: Negative for dizziness and weakness, visual changes, confusion, headaches, seizures               Phychiatric/Behavioral: Negative for depression, memory loss, substance abuse               Extremities: Negative for hair changes, rash, or skin lesion changes              Hematologic: Negative for bleeding problems, bruising, pallor or swollen lymph nodes              Peripheral Vascular: No calf pain, no circulation deficits              Musculoskeletal: As per HPI above      PHYSICAL EXAM:        Visit Vitals  Pulse (!) 116   Temp (!) 96.7 °F (35.9 °C) (Temporal)   Ht 5' 10\" (1.778 m)   Wt 182 lb 9.6 oz (82.8 kg)   SpO2 97%   BMI 26.20 kg/m²         Constitutional: [x] Alert, cooperative, appears well-developed and well-nourished [x] No apparent distress      [] Abnormal - Body mass index is 26.2 kg/m².  makes the treatment plan more complex     Mental status: [x] Alert and awake  [x] Oriented to person/place/time   [x] Normal mood/behavior/speech   [x] Normal dress/motor activity/thought processes/memory      [x] Able to follow commands    [] Abnormal - Dementia    Eyes:   EOM    [x]  Normal    [] Abnormal -   Sclera  [x]  Normal    [] Abnormal -          Discharge [x]  None visible   [] Abnormal -     HENT: [x] Normocephalic, atraumatic  [] Abnormal -   [x] Mouth/Throat: Mucous membranes are moist    External Ears [x] Normal, hearing intact  [] Abnormal -    Neck: [x] Supple. No visualized mass, normal ROM [] Abnormal -     Pulmonary/Chest: [x] Respiratory effort normal   [x] No visualized signs of difficulty breathing or respiratory distress        [] Abnormal -        Cardiovascular/    [x] Normal pulses to each foot  [] Abnormal -   Peripheral Vascular:    Neurological:        [x] No Facial Asymmetry (Cranial nerve 7 motor function) (limited exam due to video visit) [x] No gross defects         [x] No gaze palsy        [] Abnormal -          Skin:        [x] No significant exanthematous lesions or discoloration noted on facial skin or visible areas       [] Abnormal -            Psychiatric:       [x] Normal Affect, mood, judgement, behavior, conduct [] Abnormal -        [x] No Hallucinations      Musculoskeletal:   [x] Normal gait with no signs of ataxia         [x] Normal range of motion of neck        [] Abnormal -       MUSCULOSKELETAL: EXAMINATION OF:     ANKLE/FOOT left     INCISION: Incision looks good, skin well healed with some hyperpigmentation  SKIN: mild edema, no erythema, no ecchymosis, no warmth. No rash or skin lesions. No signs of infection or cellulitis present. TENDERNESS:  Mild tenderness to palpation fifth metatarsal and plantar foot  NEUROVASCULAR:  Grossly intact. Motor/Sensory: NL/NL. Positive distal pulses and capillary refill. DVT ASSESSMENT:  The calf is not tender to palpation. No evidence of DVT seen on physical exam.  LYMPHATICS: No extremity lymphedema, No calf swelling, no tenderness to calf muscles  JOINT MOTION: Not tested . There is pain-free range of motion of adjacent joints. Negative joint effusion  JOINT/TENDON STABILITY: No Ankle or Subtalar instability or joint laxity. No peroneal sublux ability or dislocation  ALIGNMENT: Forefoot, Midfoot, Hindfoot WNL. DEFORMITY: not present       An electronic signature was used to authenticate this note. -- Alex Zuniga.  [facility], THONY LUIS  5/21/2021      Disclaimer: Sections of this note may have been dictated utilizing voice recognition software, which may have resulted in some phonetic based errors in grammar and contents. Even though attempts were made to correct all the mistakes, some may have been missed, and remained in the body of the document. If questions arise, please contact our department. RADIOGRAPHS & DIAGNOSTIC STUDIES      Left foot X-rays, 3 views, AP/LAT/OBL completed 5/21/2021 AT [facility]      FINDINGS:    X-rays reveal post op changes s/p Open Fixation, Left Fifth Metatarsal Fracture Distal One Third/bone Grafting Fibula/external Bone Stimulation/paragon 28/comprehensive Fracture Tray System/c-arm/general W/ Block - Left. Overall alignment is acceptable. Hardware is in good position with no indication of movement or failure. Soft tissue swelling is mild. Degenerative changes are noted. Mineralization suggests no osteopenia. Calcified vessels are not present. IMPRESSION:    As stated above      I have personally reviewed the results of the above study.  The interpretation of this study is my professional opinion    5/21/2021  Cj Che PA-C

## 2021-06-09 DIAGNOSIS — M79.672 LEFT FOOT PAIN: ICD-10-CM

## 2021-06-09 DIAGNOSIS — S92.352D CLOSED DISPLACED FRACTURE OF FIFTH METATARSAL BONE OF LEFT FOOT WITH ROUTINE HEALING, SUBSEQUENT ENCOUNTER: ICD-10-CM

## 2021-06-09 DIAGNOSIS — G89.18 POST-OPERATIVE PAIN: Primary | ICD-10-CM

## 2021-06-09 DIAGNOSIS — Z98.890 POST-OPERATIVE STATE: ICD-10-CM

## 2021-06-09 RX ORDER — HYDROCODONE BITARTRATE AND ACETAMINOPHEN 5; 325 MG/1; MG/1
1 TABLET ORAL
Qty: 21 TABLET | Refills: 0 | Status: SHIPPED | OUTPATIENT
Start: 2021-06-09 | End: 2021-06-16

## 2021-06-09 RX ORDER — HYDROCODONE BITARTRATE AND ACETAMINOPHEN 7.5; 325 MG/1; MG/1
1-2 TABLET ORAL
Qty: 42 TABLET | Refills: 0 | Status: CANCELLED | OUTPATIENT
Start: 2021-06-09 | End: 2021-06-16

## 2021-06-11 ENCOUNTER — OFFICE VISIT (OUTPATIENT)
Dept: ORTHOPEDIC SURGERY | Age: 46
End: 2021-06-11
Payer: COMMERCIAL

## 2021-06-11 VITALS
BODY MASS INDEX: 25.91 KG/M2 | RESPIRATION RATE: 16 BRPM | WEIGHT: 181 LBS | HEIGHT: 70 IN | HEART RATE: 112 BPM | OXYGEN SATURATION: 98 % | TEMPERATURE: 97.3 F

## 2021-06-11 DIAGNOSIS — G89.18 POST-OPERATIVE PAIN: ICD-10-CM

## 2021-06-11 DIAGNOSIS — E55.9 VITAMIN D DEFICIENCY: ICD-10-CM

## 2021-06-11 DIAGNOSIS — S92.352D CLOSED DISPLACED FRACTURE OF FIFTH METATARSAL BONE OF LEFT FOOT WITH ROUTINE HEALING, SUBSEQUENT ENCOUNTER: Primary | ICD-10-CM

## 2021-06-11 PROCEDURE — 73630 X-RAY EXAM OF FOOT: CPT | Performed by: PHYSICIAN ASSISTANT

## 2021-06-11 PROCEDURE — 99024 POSTOP FOLLOW-UP VISIT: CPT | Performed by: PHYSICIAN ASSISTANT

## 2021-06-11 NOTE — PROGRESS NOTES
Rinku Liao (1975) is a 39 y.o. female, established patient, 61 days s/p Open Fixation, Left Fifth Metatarsal Fracture Distal One Third/bone Grafting Fibula/external Bone Stimulation/paragon 28/comprehensive Fracture Tray System/c-arm/general W/ Block - Left completed on 4/9/2021, and she is here for evaluation of the following chief complaint(s):    Chief Complaint   Patient presents with    Foot Pain     left foot pain          ASSESSMENT & PLAN:     Diagnoses and all orders for this visit:    1. Closed displaced fracture of fifth metatarsal bone of left foot with routine healing, subsequent encounter  -     AMB POC XRAY, FOOT; COMPLETE, 3+ VIEW  -     AMB SUPPLY ORDER  -     VITAMIN D, 25 HYDROXY; Future    2. Vitamin D deficiency  -     AMB SUPPLY ORDER  -     VITAMIN D, 25 HYDROXY; Future    3. Post-operative pain           ICD-10-CM ICD-9-CM   1. Closed displaced fracture of fifth metatarsal bone of left foot with routine healing, subsequent encounter  S92.352D V54.19   2. Vitamin D deficiency  E55.9 268.9   3. Post-operative pain  G89.18 338.18       Patient Instructions                  · Continue activity modification. Weightbearing as tolerated in the CAM boot. Wean the CAM boot to supportive shoe as tolerated. Use the Tubigrip for swelling. · Follow RICE protocol: Rest, Ice (not directly on the skin) and Elevate   · Take medication as directed, (if tolerated)  · Take Prilosec or Pepcid while taking any antiinflammatory (if tolerated) medication  · Maintain proper nutrition   · Take a multivitamin, calcium + Vitamin D supplement daily (if tolerated)  · Use Tubigrip as needed  · Vitamin D labs ordered  · Order provided for custom orthotic with lateral post, left  · Please follow up as instructed or sooner as needed      If you have a reminder for a \"due or due soon\" health maintenance, please contact your primary care provider for follow-up on this health maintenance.     Ro CAMPBELL Richie Diaz [facility], THONY LUIS  6/11/2021  2:13 PM        Follow-up and Dispositions    · Return in about 6 weeks (around 7/23/2021) for follow up evaluation. Dr. Manny Degroot has been consulted regarding the patient during this visit and he agrees with the assessment and plan    Patient expresses understanding of the diagnosis and treatment plan. Patient education has been provided. Barton Holstein may have a reminder for a \"due or due soon\" health maintenance. I have asked that she contact her primary care provider for follow-up on this health maintenance.  was reviewed by Ramesh Salmeron PA-C on 6/11/2021. SUBJECTIVE & OBJECTIVE:     HPI    Since last seen in the office, the patient reports that she is well. She was in a wedding over the weekend and did wear the CAM boot, but had some increase pain due to excessive standing and walking. Otherwise, she states that she has very little pain to the fifth metatarsal and she feels much better. Patient has a vitamin D deficiency and has been taking the Vitamin D supplement. She has one pill left. We will repeat her Vitamin D labs. Patient denies any fever, chills, CP, SOB or calf pain. The patient denies any no new illness or injuries to report since last seen in the office. There are no reported changes in medications, allergies, social or family history. The patient is on ASA for DVT prophylaxis. Barton Holstein has been experiencing pain, discomfort and associated symptoms and has tried modalities of treatment and/or self treatment confirmed as outlined in the pain assessment below.      Pain Assessment  6/11/2021   Location of Pain Foot   Pain Location Comment -   Location Modifiers Left   Severity of Pain 0   Quality of Pain -   Quality of Pain Comment -   Duration of Pain -   Frequency of Pain -   Aggravating Factors -   Aggravating Factors Comment -   Limiting Behavior -   Relieving Factors -   Relieving Factors Comment -   Result of Injury -   Work-Related Injury -   Type of Injury -          Rodney Finn  has a past medical history of ADHD, Anxiety, Body piercing, IBS (irritable bowel syndrome), IUD (intrauterine device) in place, and Sleep disorder. Other than previously noted, patient reports no changes in medications, allergies, social or family history. REVIEW OF SYSTEMS:                  All Below are Negative except as indicated in the HPI: See HPI                Constitutional: negative for fever, chills, night sweats and unexpected weight loss and malaise/fatigue              HEENT: Negative. No hoarseness, no double vision              Respiratory: Negative. No difficulty breathing, No SOB              Cardiovascular: negative for chest pain, claudication, QUINTERO. (-) Leg swelling               Gastrointestinal: Negative for pain, Blood in stool, incontinence, pelvic pain, N/V/C/D              Genitourinary: No saddle anesthesia, pelvic pain, blood in urine, incontinence, dysuria               Neurological: Negative for dizziness and weakness, visual changes, confusion, headaches, seizures               Phychiatric/Behavioral: Negative for depression, memory loss, substance abuse               Extremities: Negative for hair changes, rash, or skin lesion changes              Hematologic: Negative for bleeding problems, bruising, pallor or swollen lymph nodes              Peripheral Vascular: No calf pain, no circulation deficits              Musculoskeletal: As per HPI above      PHYSICAL EXAM:        Visit Vitals  Pulse (!) 112   Temp 97.3 °F (36.3 °C) (Temporal)   Resp 16   Ht 5' 10\" (1.778 m)   Wt 181 lb (82.1 kg)   SpO2 98%   BMI 25.97 kg/m²         Constitutional: [x] Alert, cooperative, appears well-developed and well-nourished [x] No apparent distress      [] Abnormal - Body mass index is 25.97 kg/m².  makes the treatment plan more complex     Mental status: [x] Alert and awake  [x] Oriented to person/place/time   [x] Normal mood/behavior/speech [x] Normal dress/motor activity/thought processes/memory      [x] Able to follow commands    [] Abnormal - Dementia    Eyes:   EOM    [x]  Normal    [] Abnormal -   Sclera  [x]  Normal    [] Abnormal -          Discharge [x]  None visible   [] Abnormal -     HENT: [x] Normocephalic, atraumatic  [] Abnormal -   [x] Mouth/Throat: Mucous membranes are moist    External Ears [x] Normal, hearing intact  [] Abnormal -    Neck: [x] Supple. No visualized mass, normal ROM [] Abnormal -     Pulmonary/Chest: [x] Respiratory effort normal   [x] No visualized signs of difficulty breathing or respiratory distress        [] Abnormal -        Cardiovascular/    [x] Normal pulses to each foot  [] Abnormal -   Peripheral Vascular:    Neurological:        [x] No Facial Asymmetry (Cranial nerve 7 motor function) (limited exam due to video visit) [x] No gross defects         [x] No gaze palsy        [] Abnormal -          Skin:        [x] No significant exanthematous lesions or discoloration noted on facial skin or visible areas       [] Abnormal -            Psychiatric:       [x] Normal Affect, mood, judgement, behavior, conduct [] Abnormal -        [x] No Hallucinations      Musculoskeletal:   [x] Normal gait with no signs of ataxia         [x] Normal range of motion of neck        [] Abnormal -       MUSCULOSKELETAL: EXAMINATION OF:     ANKLE/FOOT left     INCISION: Incision looks good, skin well healed with some hyperpigmentation  SKIN: mild edema, no erythema, no ecchymosis, no warmth. No rash or skin lesions. No signs of infection or cellulitis present. TENDERNESS:  Mild tenderness to palpation fifth metatarsal and plantar foot  NEUROVASCULAR:  Grossly intact. Motor/Sensory: NL/NL. Positive distal pulses and capillary refill. DVT ASSESSMENT:  The calf is not tender to palpation.  No evidence of DVT seen on physical exam.  LYMPHATICS: No extremity lymphedema, No calf swelling, no tenderness to calf muscles  JOINT MOTION: Not tested . There is pain-free range of motion of adjacent joints. Negative joint effusion  JOINT/TENDON STABILITY: No Ankle or Subtalar instability or joint laxity. No peroneal sublux ability or dislocation  ALIGNMENT: Forefoot, Midfoot, Hindfoot WNL. DEFORMITY: not present       An electronic signature was used to authenticate this note. -- Stefani Dewitt. [facility], THONY LUIS  6/11/2021      Disclaimer: Sections of this note may have been dictated utilizing voice recognition software, which may have resulted in some phonetic based errors in grammar and contents. Even though attempts were made to correct all the mistakes, some may have been missed, and remained in the body of the document. If questions arise, please contact our department. RADIOGRAPHS & DIAGNOSTIC STUDIES      Left foot X-rays, 3 views, AP/LAT/OBL completed 6/11/2021 AT [facility]      FINDINGS:    X-rays reveal post op changes s/p Open Fixation, Left Fifth Metatarsal Fracture Distal One Third/bone Grafting Fibula/external Bone Stimulation/paragon 28/comprehensive Fracture Tray System/c-arm/general W/ Block - Left. Overall alignment is acceptable. Hardware is in good position with no indication of movement or failure. Soft tissue swelling is mild. Degenerative changes are noted. Mineralization suggests no osteopenia. Calcified vessels are not present. IMPRESSION:    As stated above      I have personally reviewed the results of the above study.  The interpretation of this study is my professional opinion    6/11/2021  Machelle Griffin PA-C

## 2021-06-11 NOTE — PATIENT INSTRUCTIONS
· Continue activity modification. Weightbearing as tolerated in the CAM boot. Wean the CAM boot to supportive shoe as tolerated. Use the Tubigrip for swelling. · Follow RICE protocol: Rest, Ice (not directly on the skin) and Elevate   · Take medication as directed, (if tolerated)  · Take Prilosec or Pepcid while taking any antiinflammatory (if tolerated) medication  · Maintain proper nutrition   · Take a multivitamin, calcium + Vitamin D supplement daily (if tolerated)  · Use Tubigrip as needed  · Vitamin D labs ordered  · Order provided for custom orthotic with lateral post, left  · Please follow up as instructed or sooner as needed      If you have a reminder for a \"due or due soon\" health maintenance, please contact your primary care provider for follow-up on this health maintenance. Ro Rabago [facility], MPA, PA-C  6/11/2021  2:13 PM

## 2021-07-14 ENCOUNTER — HOSPITAL ENCOUNTER (OUTPATIENT)
Dept: LAB | Age: 46
Discharge: HOME | End: 2021-07-14

## 2021-07-14 LAB — XX-LABCORP SPECIMEN COL,LCBCF: NORMAL

## 2021-07-14 PROCEDURE — 99001 SPECIMEN HANDLING PT-LAB: CPT

## 2021-07-15 DIAGNOSIS — S92.352D CLOSED DISPLACED FRACTURE OF FIFTH METATARSAL BONE OF LEFT FOOT WITH ROUTINE HEALING, SUBSEQUENT ENCOUNTER: ICD-10-CM

## 2021-07-15 DIAGNOSIS — E55.9 VITAMIN D DEFICIENCY: ICD-10-CM

## 2022-03-23 ENCOUNTER — HOSPITAL ENCOUNTER (EMERGENCY)
Age: 47
Discharge: HOME | End: 2022-03-23
Attending: STUDENT IN AN ORGANIZED HEALTH CARE EDUCATION/TRAINING PROGRAM
Payer: COMMERCIAL

## 2022-03-23 ENCOUNTER — APPOINTMENT (OUTPATIENT)
Dept: GENERAL RADIOLOGY | Age: 47
End: 2022-03-23
Attending: PHYSICIAN ASSISTANT
Payer: COMMERCIAL

## 2022-03-23 VITALS
DIASTOLIC BLOOD PRESSURE: 98 MMHG | HEART RATE: 116 BPM | TEMPERATURE: 98.8 F | OXYGEN SATURATION: 99 % | BODY MASS INDEX: 25.77 KG/M2 | HEIGHT: 70 IN | SYSTOLIC BLOOD PRESSURE: 140 MMHG | RESPIRATION RATE: 18 BRPM | WEIGHT: 180 LBS

## 2022-03-23 DIAGNOSIS — R93.89 ABNORMAL X-RAY: ICD-10-CM
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
## 2022-03-23 DIAGNOSIS — S61.411A LACERATION OF RIGHT HAND WITHOUT FOREIGN BODY, INITIAL ENCOUNTER: ICD-10-CM

## 2022-03-23 PROCEDURE — 73080 X-RAY EXAM OF ELBOW: CPT

## 2022-03-23 PROCEDURE — 73130 X-RAY EXAM OF HAND: CPT

## 2022-03-23 PROCEDURE — 90471 IMMUNIZATION ADMIN: CPT

## 2022-03-23 PROCEDURE — 99284 EMERGENCY DEPT VISIT MOD MDM: CPT

## 2022-03-23 PROCEDURE — 74011250637 HC RX REV CODE- 250/637: Performed by: PHYSICIAN ASSISTANT

## 2022-03-23 PROCEDURE — 73090 X-RAY EXAM OF FOREARM: CPT

## 2022-03-23 PROCEDURE — 90715 TDAP VACCINE 7 YRS/> IM: CPT | Performed by: PHYSICIAN ASSISTANT

## 2022-03-23 PROCEDURE — 74011250636 HC RX REV CODE- 250/636: Performed by: PHYSICIAN ASSISTANT

## 2022-03-23 RX ORDER — HYDROCODONE BITARTRATE AND ACETAMINOPHEN 5; 325 MG/1; MG/1
1 TABLET ORAL
Qty: 10 TABLET | Refills: 0 | Status: SHIPPED | OUTPATIENT
Start: 2022-03-23 | End: 2022-03-26

## 2022-03-23 RX ORDER — HYDROCODONE BITARTRATE AND ACETAMINOPHEN 5; 325 MG/1; MG/1
1 TABLET ORAL
Status: COMPLETED | OUTPATIENT
Start: 2022-03-23 | End: 2022-03-23

## 2022-03-23 RX ADMIN — TETANUS TOXOID, REDUCED DIPHTHERIA TOXOID AND ACELLULAR PERTUSSIS VACCINE, ADSORBED 0.5 ML: 5; 2.5; 8; 8; 2.5 SUSPENSION INTRAMUSCULAR at 13:22

## 2022-03-23 RX ADMIN — HYDROCODONE BITARTRATE AND ACETAMINOPHEN 1 TABLET: 5; 325 TABLET ORAL at 12:58

## 2022-03-23 NOTE — ED PROVIDER NOTES
EMERGENCY DEPARTMENT HISTORY AND PHYSICAL EXAM    Date: 3/23/2022  Patient Name: Elizabeth Reese    History of Presenting Illness     Chief Complaint   Patient presents with    Hand Pain         History Provided By: Patient   Chief Complaint: Right hand injury, fall  Duration: Yesterday  Timing: Acute  Location: Right hand, forearm and elbow  Quality: Aching  Severity: Moderate  Modifying Factors: Worse after mechanical fall  Associated Symptoms: none       Additional History (Context): Elizabeth Reese is a 42 y.o. female with a history of IBS and anxiety who presents today for issues listed above. Patient reports she was walking into the house and tripped and fell. States that she landed with an outstretched hand trying to brace herself. Is unsure what she may have cut her hand on. Denies last known tetanus. Did clean the wound and placed Steri-Strips. Reports she did make an appointment with her orthopedist but cannot get in for couple days. Reports she has been taking Motrin without complete relief. Denies hitting head or LOC. Is not on blood thinners. PCP: Unknown, Provider, DPM    Current Outpatient Medications   Medication Sig Dispense Refill    HYDROcodone-acetaminophen (Norco) 5-325 mg per tablet Take 1 Tablet by mouth every six (6) hours as needed for Pain for up to 3 days. Max Daily Amount: 4 Tablets. 10 Tablet 0    Emoll Comb. No.46-Sunscreen (Mederma) 30 SPF crea APPLY TO AFFECTED AREA THREE (3) TIMES PER DAY FOR 8 WEEKS 30 g 1    omeprazole (PRILOSEC) 20 mg capsule Take 20 Caps by mouth daily.  zolpidem CR (AMBIEN CR) 12.5 mg tablet Take 12.5 mg by mouth nightly as needed.  polyethylene glycol (Miralax) 17 gram/dose powder Take 17 g by mouth daily. 255 g 1    ondansetron (ZOFRAN ODT) 4 mg disintegrating tablet Take 1 Tab by mouth every eight (8) hours as needed for Nausea or Vomiting.  Indications: prevent nausea and vomiting after surgery 30 Tab 1    aspirin (ASPIRIN) 325 mg tablet Take 1 Tab by mouth daily. 30 Tab 0    ergocalciferol (ERGOCALCIFEROL) 1,250 mcg (50,000 unit) capsule TAKE ONE CAPSULE BY MOUTH EVERY 7 DAYS FOR VITAMIN*D DEFICIENCY( HIGH DOSE THERAPY) 12 Cap 0    linaCLOtide (Linzess) 145 mcg cap capsule Take  by mouth daily.  promethazine (PHENERGAN) 25 mg tablet Take 1 Tab by mouth every six (6) hours as needed for Nausea. 30 Tab 0    valACYclovir (VALTREX) 500 mg tablet Take 500 mg by mouth daily.  polyethylene glycol (MIRALAX) 17 gram packet Take 1 Packet by mouth daily. 10 Packet 1    buPROPion SR (WELLBUTRIN SR) 150 mg SR tablet Take  by mouth daily.  Lisdexamfetamine (VYVANSE) 70 mg cap Take  by mouth daily.  lamoTRIgine (LAMICTAL) 100 mg tablet Take 200 mg by mouth two (2) times a day.  lurasidone (LATUDA) 20 mg tab tablet Take 40 mg by mouth daily (with dinner).  LORazepam (ATIVAN) 2 mg tablet Take  by mouth every six (6) hours as needed for Anxiety. Past History     Past Medical History:  Past Medical History:   Diagnosis Date    ADHD     Anxiety     Body piercing     Navel    IBS (irritable bowel syndrome)     IUD (intrauterine device) in place     Sleep disorder        Past Surgical History:  Past Surgical History:   Procedure Laterality Date    FOOT/TOES SURGERY PROC UNLISTED      HX  SECTION      HX CHOLECYSTECTOMY      HX CYST REMOVAL      from back- lipoma    HX GYN  2011    Ectopic pregnancy        Family History:  Family History   Problem Relation Age of Onset    Hypertension Mother     Hypertension Father        Social History:  Social History     Tobacco Use    Smoking status: Former Smoker     Quit date: 2020     Years since quittin.7    Smokeless tobacco: Never Used    Tobacco comment: 3 cigs per day   Vaping Use    Vaping Use: Never used   Substance Use Topics    Alcohol use:  Yes     Alcohol/week: 2.0 standard drinks     Types: 2 Glasses of wine per week     Comment: weekends    Drug use: Never       Allergies:  No Known Allergies      Review of Systems   Review of Systems   Constitutional: Negative for chills and fever. HENT: Negative for congestion, rhinorrhea and sore throat. Respiratory: Negative for cough and shortness of breath. Cardiovascular: Negative for chest pain. Gastrointestinal: Negative for abdominal pain, blood in stool, constipation, diarrhea, nausea and vomiting. Genitourinary: Negative for dysuria, frequency and hematuria. Musculoskeletal: Positive for arthralgias. Negative for back pain and myalgias. Skin: Positive for wound. Negative for rash. Neurological: Negative for dizziness and headaches. All other systems reviewed and are negative. All Other Systems Negative  Physical Exam     Vitals:    03/23/22 1134 03/23/22 1136   BP:  (!) 140/98   Pulse: (!) 116    Resp: 18    Temp: 98.8 °F (37.1 °C)    SpO2: 99%    Weight: 81.6 kg (180 lb)    Height: 5' 10\" (1.778 m)      Physical Exam  Vitals and nursing note reviewed. Constitutional:       General: She is not in acute distress. Appearance: She is well-developed. She is not diaphoretic. HENT:      Head: Normocephalic and atraumatic. Eyes:      Conjunctiva/sclera: Conjunctivae normal.   Cardiovascular:      Rate and Rhythm: Normal rate and regular rhythm. Heart sounds: Normal heart sounds. Pulmonary:      Effort: Pulmonary effort is normal. No respiratory distress. Breath sounds: Normal breath sounds. Chest:      Chest wall: No tenderness. Abdominal:      General: Bowel sounds are normal. There is no distension. Palpations: Abdomen is soft. Tenderness: There is no abdominal tenderness. There is no guarding or rebound. Musculoskeletal:         General: No deformity. Left elbow: No swelling, deformity or lacerations. Normal range of motion. Tenderness present. Right forearm: Tenderness present. Right wrist: Tenderness present. No crepitus. Normal pulse. Left wrist: No snuff box tenderness or crepitus. Normal pulse. Cervical back: Normal range of motion and neck supple. Skin:     General: Skin is warm and dry. Findings: Bruising and laceration present. Comments: 1 inch laceration noted to the right thumb. Bleeding well controlled. Steri-Strips in place. Neurological:      Mental Status: She is alert and oriented to person, place, and time. Deep Tendon Reflexes: Reflexes are normal and symmetric. Diagnostic Study Results     Labs -   No results found for this or any previous visit (from the past 12 hour(s)). Radiologic Studies -   XR FOREARM RT AP/LAT   Final Result      Right elbow:   -No definite fracture. Anterior fat pad borderline prominent which could reflect   small joint effusion, and can be associated with occult radial head fracture. Clinical correlation advised. Right forearm:   -No acute osseous findings. Distal forearm soft tissue swelling. Right hand:   -No acute radiographic findings. XR HAND RT MIN 3 V   Final Result      Right elbow:   -No definite fracture. Anterior fat pad borderline prominent which could reflect   small joint effusion, and can be associated with occult radial head fracture. Clinical correlation advised. Right forearm:   -No acute osseous findings. Distal forearm soft tissue swelling. Right hand:   -No acute radiographic findings. XR ELBOW RT MIN 3 V   Final Result      Right elbow:   -No definite fracture. Anterior fat pad borderline prominent which could reflect   small joint effusion, and can be associated with occult radial head fracture. Clinical correlation advised. Right forearm:   -No acute osseous findings. Distal forearm soft tissue swelling. Right hand:   -No acute radiographic findings.         CT Results  (Last 48 hours)    None        CXR Results  (Last 48 hours)    None            Medical Decision Making   I am the first provider for this patient. I reviewed the vital signs, available nursing notes, past medical history, past surgical history, family history and social history. Vital Signs-Reviewed the patient's vital signs. Records Reviewed: Nursing Notes and Old Medical Records     Procedures: None   Procedures    Provider Notes (Medical Decision Making):     Differential: fracture, dislocation, abrasion, sprain, contusion, laceration      Plan: Will order xrays, update tetanus, order pain medicine and clean and dress wound. 1:30 PM  Have discussed concerns for possible radial head fracture. Will place in a posterior arm splint. Will give sling. Have advised patient to keep her orthopedic appointment as planned. Wound has been cleaned and dressed. Have given splint precautions. Will discharge home with pain medication. MED RECONCILIATION:  No current facility-administered medications for this encounter. Current Outpatient Medications   Medication Sig    HYDROcodone-acetaminophen (Norco) 5-325 mg per tablet Take 1 Tablet by mouth every six (6) hours as needed for Pain for up to 3 days. Max Daily Amount: 4 Tablets.  Emoll Comb. No.46-Sunscreen (Mederma) 30 SPF crea APPLY TO AFFECTED AREA THREE (3) TIMES PER DAY FOR 8 WEEKS    omeprazole (PRILOSEC) 20 mg capsule Take 20 Caps by mouth daily.  zolpidem CR (AMBIEN CR) 12.5 mg tablet Take 12.5 mg by mouth nightly as needed.  polyethylene glycol (Miralax) 17 gram/dose powder Take 17 g by mouth daily.  ondansetron (ZOFRAN ODT) 4 mg disintegrating tablet Take 1 Tab by mouth every eight (8) hours as needed for Nausea or Vomiting. Indications: prevent nausea and vomiting after surgery    aspirin (ASPIRIN) 325 mg tablet Take 1 Tab by mouth daily.     ergocalciferol (ERGOCALCIFEROL) 1,250 mcg (50,000 unit) capsule TAKE ONE CAPSULE BY MOUTH EVERY 7 DAYS FOR VITAMIN*D DEFICIENCY( HIGH DOSE THERAPY)    linaCLOtide (Linzess) 145 mcg cap capsule Take  by mouth daily.  promethazine (PHENERGAN) 25 mg tablet Take 1 Tab by mouth every six (6) hours as needed for Nausea.  valACYclovir (VALTREX) 500 mg tablet Take 500 mg by mouth daily.  polyethylene glycol (MIRALAX) 17 gram packet Take 1 Packet by mouth daily.  buPROPion SR (WELLBUTRIN SR) 150 mg SR tablet Take  by mouth daily.  Lisdexamfetamine (VYVANSE) 70 mg cap Take  by mouth daily.  lamoTRIgine (LAMICTAL) 100 mg tablet Take 200 mg by mouth two (2) times a day.  lurasidone (LATUDA) 20 mg tab tablet Take 40 mg by mouth daily (with dinner).  LORazepam (ATIVAN) 2 mg tablet Take  by mouth every six (6) hours as needed for Anxiety. Disposition:  Home     DISCHARGE NOTE:   Pt has been reexamined. Patient has no new complaints, changes, or physical findings. Care plan outlined and precautions discussed. Results of workup were reviewed with the patient. All medications were reviewed with the patient. All of pt's questions and concerns were addressed. Patient was instructed and agrees to follow up with PCP as well as to return to the ED upon further deterioration. Patient is ready to go home. Follow-up Information     Follow up With Specialties Details Why Contact Info    [facility] EMERGENCY DEPT Emergency Medicine  As needed [address]  [phone]    Follow-up as planned with your orthopedist in 1 to 2 days. Current Discharge Medication List      START taking these medications    Details   HYDROcodone-acetaminophen (Norco) 5-325 mg per tablet Take 1 Tablet by mouth every six (6) hours as needed for Pain for up to 3 days. Max Daily Amount: 4 Tablets. Qty: 10 Tablet, Refills: 0  Start date: 3/23/2022, End date: 3/26/2022    Associated Diagnoses: Fall, initial encounter                 Diagnosis     Clinical Impression:   1. Fall, initial encounter    2. Laceration of right hand without foreign body, initial encounter    3.  Abnormal x-ray \"Please note that this dictation was completed with Sharethrough, the computer voice recognition software. Quite often unanticipated grammatical, syntax, homophones, and other interpretive errors are inadvertently transcribed by the computer software. Please disregard these errors. Please excuse any errors that have escaped final proofreading. \"

## 2022-03-23 NOTE — ED TRIAGE NOTES
Pt ambulatory to triage without assistance. Pt alert and oriented x 4. Laceration to right thumb. Fall reported a couple of days ago per pt. Bleeding controlled at this time.   Tetanus not up todate

## 2022-03-23 NOTE — Clinical Note
[facility] EMERGENCY DEPT  [id number] [facility] [id number] [phone]    Work/School Note    Date: 3/23/2022    To Whom It May concern:    Elder Bang was seen and treated today in the emergency room by the following provider(s):  Attending Provider: Spencer Benedict DO  Physician Assistant: RICK Burgos. Elder Bang is excused from work/school on 3/23/2022 through 3/25/2022. She is medically clear to return to work/school on 3/26/2022.          Sincerely,          RICK Rosario

## 2022-03-23 NOTE — Clinical Note
[address] [place] [address] Dr [facility] EMERGENCY DEPT  [id number] [address] [phone]    Work/School Note    Date: 3/23/2022    To Whom It May concern:    Deng Tariq was seen and treated today in the emergency room by the following provider(s):  Attending Provider: Darlene Serrato DO  Physician Assistant: RICK Wilson. Deng Tariq is excused from work/school on 3/23/2022 through 3/25/2022. She is medically clear to return to work/school on 3/26/2022.          Sincerely,          RICK Saxena

## 2022-03-29 ENCOUNTER — OFFICE VISIT (OUTPATIENT)
Dept: ORTHOPEDIC SURGERY | Age: 47
End: 2022-03-29
Payer: COMMERCIAL

## 2022-03-29 VITALS
BODY MASS INDEX: 26.77 KG/M2 | WEIGHT: 187 LBS | RESPIRATION RATE: 16 BRPM | HEIGHT: 70 IN | TEMPERATURE: 97.2 F | HEART RATE: 104 BPM | OXYGEN SATURATION: 96 %

## 2022-03-29 DIAGNOSIS — M25.531 RIGHT WRIST PAIN: ICD-10-CM

## 2022-03-29 DIAGNOSIS — S40.021A CONTUSION OF MULTIPLE SITES OF RIGHT UPPER EXTREMITY, INITIAL ENCOUNTER: ICD-10-CM
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
## 2022-03-29 DIAGNOSIS — S61.011A LACERATION OF RIGHT THUMB WITHOUT FOREIGN BODY WITHOUT DAMAGE TO NAIL, INITIAL ENCOUNTER: ICD-10-CM

## 2022-03-29 DIAGNOSIS — M25.521 RIGHT ELBOW PAIN: ICD-10-CM

## 2022-03-29 DIAGNOSIS — M25.631 DECREASED RANGE OF MOTION OF RIGHT WRIST: ICD-10-CM

## 2022-03-29 DIAGNOSIS — M25.621 DECREASED RANGE OF MOTION OF RIGHT ELBOW: ICD-10-CM

## 2022-03-29 DIAGNOSIS — G89.11 ACUTE PAIN DUE TO TRAUMA: ICD-10-CM

## 2022-03-29 DIAGNOSIS — R58 ECCHYMOSIS ON EXAMINATION: ICD-10-CM

## 2022-03-29 DIAGNOSIS — R52 PAIN: Primary | ICD-10-CM

## 2022-03-29 PROCEDURE — 73110 X-RAY EXAM OF WRIST: CPT | Performed by: PHYSICIAN ASSISTANT

## 2022-03-29 PROCEDURE — 99213 OFFICE O/P EST LOW 20 MIN: CPT | Performed by: PHYSICIAN ASSISTANT

## 2022-03-29 PROCEDURE — 73080 X-RAY EXAM OF ELBOW: CPT | Performed by: PHYSICIAN ASSISTANT

## 2022-03-29 RX ORDER — TRAMADOL HYDROCHLORIDE 50 MG/1
50 TABLET ORAL
Qty: 14 TABLET | Refills: 0 | Status: SHIPPED | OUTPATIENT
Start: 2022-03-29 | End: 2022-04-05

## 2022-03-29 NOTE — PROGRESS NOTES
Patient: Kandis Mendez                MRN: [id number]       SSN: [id number]  YOB: 1975        AGE: 42 y.o. SEX: female          PCP: Unknown, Provider, MD  03/29/22    Chief Complaint   Patient presents with    Elbow Pain     right elbow pain       HISTORY:  Kandis Mendez is a 42 y.o. female presents to the office 1 week status post fall at home. She lost her balance with her right hand outstretched falling on the ground. There was no loss of conscious before or after the fall. Patient was seen through the emergency department 24 hours following the incident. The resulting fall caused significant contusion injury to the right forearm and wrist as well as an avulsion laceration to the palmar aspect of the right thumb. Due to the time delay in seeing emergency room no suture closure could be performed on the open wound thumb base right hand. Instead the area was Steri-Stripped and patient was encouraged to change the dressing daily. She is not taking anything for pain currently. She was given hydrocodone for symptom management however she has run out of that medication. Tylenol and Motrin have been unsuccessful for symptom management. Regarding her right elbow she has pain over the inner portion particularly when she flexes. No pain reported in the extension plane. No history of prior trauma to the right upper extremity. Pain Assessment  3/29/2022   Location of Pain Elbow   Pain Location Comment -   Location Modifiers Left   Severity of Pain 8   Quality of Pain Throbbing; Sharp;Dull;Aching   Quality of Pain Comment -   Duration of Pain Persistent   Frequency of Pain Constant   Date Pain First Started 3/22/2022   Aggravating Factors Other (Comment)   Aggravating Factors Comment using the elbow and hand   Limiting Behavior -   Relieving Factors Nothing   Relieving Factors Comment -   Result of Injury Yes   Work-Related Injury No Type of Injury Fall           No results found for: HBA1C, IXF0HZTF, LCC7PMRR  Weight Metrics 3/29/2022 3/23/2022 2021 2021 2021 2021 3/31/2021   Weight 187 lb 180 lb 181 lb 182 lb 9.6 oz - 179 lb 3.2 oz 183 lb   BMI 26.83 kg/m2 25.83 kg/m2 25.97 kg/m2 26.2 kg/m2 25.71 kg/m2 25.71 kg/m2 26.26 kg/m2            Problem List Items Addressed This Visit     None      Visit Diagnoses     Pain    -  Primary    Relevant Orders    AMB POC XRAY, WRIST; COMPLETE, 3+ VIE (Completed)    AMB POC XRAY, ELBOW; COMPLETE, 3+ VIE (Completed)    Right elbow pain        Relevant Orders    AMB POC XRAY, WRIST; COMPLETE, 3+ VIE (Completed)    AMB POC XRAY, ELBOW; COMPLETE, 3+ VIE (Completed)    Right wrist pain        Relevant Orders    AMB POC XRAY, WRIST; COMPLETE, 3+ VIE (Completed)    AMB POC XRAY, ELBOW; COMPLETE, 3+ VIE (Completed)    Fall, initial encounter        Laceration of right thumb without foreign body without damage to nail, initial encounter        Ecchymosis on examination        Decreased range of motion of right wrist        Decreased range of motion of right elbow              PAST MEDICAL HISTORY:   Past Medical History:   Diagnosis Date    ADHD     Anxiety     Body piercing     Navel    IBS (irritable bowel syndrome)     IUD (intrauterine device) in place     Sleep disorder        PAST SURGICAL HISTORY:   Past Surgical History:   Procedure Laterality Date    FOOT/TOES SURGERY PROC UNLISTED      HX  SECTION      HX CHOLECYSTECTOMY      HX CYST REMOVAL      from back- lipoma    HX GYN  2011    Ectopic pregnancy        ALLERGIES: No Known Allergies     CURRENT MEDICATIONS:  A list of medications prior to the time of admission include:  Prior to Admission medications    Medication Sig Start Date End Date Taking? Authorizing Provider   Emoll Comb.  No.46-Sunscreen (Mederma) 30 SPF crea APPLY TO AFFECTED AREA THREE (3) TIMES PER DAY FOR 8 WEEKS 21  Yes Zonia Dubon PA-C omeprazole (PRILOSEC) 20 mg capsule Take 20 Caps by mouth daily. 1/19/21  Yes Provider, Historical   zolpidem CR (AMBIEN CR) 12.5 mg tablet Take 12.5 mg by mouth nightly as needed. 3/30/21  Yes Provider, Historical   ondansetron (ZOFRAN ODT) 4 mg disintegrating tablet Take 1 Tab by mouth every eight (8) hours as needed for Nausea or Vomiting. Indications: prevent nausea and vomiting after surgery 4/9/21  Yes Lissette Barros PA-C   aspirin (ASPIRIN) 325 mg tablet Take 1 Tab by mouth daily. 4/9/21  Yes Lissette Barros PA-C   ergocalciferol (ERGOCALCIFEROL) 1,250 mcg (50,000 unit) capsule TAKE ONE CAPSULE BY MOUTH EVERY 7 DAYS FOR VITAMIN*D DEFICIENCY( HIGH DOSE THERAPY) 4/6/21  Yes Lissette Barros PA-C   linaCLOtide (Linzess) 145 mcg cap capsule Take  by mouth daily. Yes Provider, Historical   valACYclovir (VALTREX) 500 mg tablet Take 500 mg by mouth daily. Yes Provider, Historical   buPROPion SR (WELLBUTRIN SR) 150 mg SR tablet Take  by mouth daily. Yes Provider, Historical   Lisdexamfetamine (VYVANSE) 70 mg cap Take  by mouth daily. Yes Provider, Historical   lamoTRIgine (LAMICTAL) 100 mg tablet Take 200 mg by mouth two (2) times a day. Yes Provider, Historical   lurasidone (LATUDA) 20 mg tab tablet Take 40 mg by mouth daily (with dinner). Yes Provider, Historical   LORazepam (ATIVAN) 2 mg tablet Take  by mouth every six (6) hours as needed for Anxiety. Yes Provider, Historical   polyethylene glycol (Miralax) 17 gram/dose powder Take 17 g by mouth daily. Patient not taking: Reported on 3/29/2022 4/9/21   Lissette Barros PA-C   promethazine (PHENERGAN) 25 mg tablet Take 1 Tab by mouth every six (6) hours as needed for Nausea. Patient not taking: Reported on 3/29/2022 11/20/19   Danya BARCENAS PA-C   polyethylene glycol (MIRALAX) 17 gram packet Take 1 Packet by mouth daily.   Patient not taking: Reported on 3/29/2022 9/9/19   Lissette Barros PA-C       FAMILY HISTORY:   Family History   Problem Relation Age of Onset    Hypertension Mother     Hypertension Father        SOCIAL HISTORY:   Social History     Socioeconomic History    Marital status: SINGLE   Tobacco Use    Smoking status: Former Smoker     Quit date: 2020     Years since quittin.7    Smokeless tobacco: Never Used    Tobacco comment: 3 cigs per day   Vaping Use    Vaping Use: Never used   Substance and Sexual Activity    Alcohol use: Yes     Alcohol/week: 2.0 standard drinks     Types: 2 Glasses of wine per week     Comment: weekends    Drug use: Never       ROS:No CP, No SOB, No fever/chills nor night sweats. No headaches, vision abnormalities to include double and or loss of vision. No dizziness. No hearing abnormalities. No Chest Pain nor Shortness of breath. Pt denies h/o spinal surgery, injections, or PT/chiropractor. Patient has attempted self treatment with less than adequate relief on oral and topical analgesic / anti inflammatory medications . Pt denies change in bowel or bladder habits. No saddle paresthesia / anesthesia. Pt denies fever, unplanned weight loss / weight gains, and no skin changes. Musculoskeletal pain per HPI. Pain is exacerbated positionally. PHYSICAL EXAM:    Visit Vitals  Pulse (!) 104   Temp 97.2 °F (36.2 °C) (Temporal)   Resp 16   Ht 5' 10\" (1.778 m)   Wt 187 lb (84.8 kg)   SpO2 96%   BMI 26.83 kg/m²       Constitutional: Appears well-developed and well-nourished. No distress. Sitting comfortably in the exam room, interacting with conversation with pleasant affect. Gait appears steady and patient exhibits no evidence of ataxia. Patient is able to ambulate with caution. No focal neurological deficit noted. No facial droop, slurred speech, or evidence of altered mentation noted on exam.   Skin: Skin over the head, neck, bilateral limbs, and trunk is warm and dry. No rash or erythema noted. Cranial Nerves II-XII grossly intact  HENT: NC/AT.  Normal symmetry, bulk and tone of facial and neck musculature. Trachea midline. No discernible thyromegaly or masses. No involuntary movements. Lymphatic: No preauricular, submandibuar, anterior or posterior cervical lymphadenopathy. Psychiatric: The patient is awake, alert, and oriented to person, place and time. Behavior is normal. Thought content normal.   Cardiovascular: No clubbing, cyanosis. No edema bilateral lower extremities. Pulmonary: No tripoding nor accessory muscle recruitment. Breathing normally, no distress, no audible wheezing. Distal cap refill intact at 2/2 Varun UE / LE. Neuro intact Varun UE/LE to noxious stimuli        Ortho Specific exam:      Right upper extremity reveals skin intact about the shoulder and elbow extending to the forearm however at the volar thumb base there is a transverse deep laceration measuring 3 cm [id number] and estimated at 3 to 4 mm in depth. Wound borders are macerated. Overall the wound is dry and clean surrounding. Patient has full active and passive range of motion of the right thumb against resistance confirming the extensor and flexor tendons intact. Nail is intact. There is extensive bruising circumferentially about the wrist extending 6 cm proximal right forearm which is associated with soft tissue swelling circumferentially about the base of the right hand extending 4 cm proximal about the right forearm. Passive range of motion of the right wrist 40 degrees extension 30 degrees flexion with pain and guarding in both planes of motion through to endpoint. No pain directly over the ulnar styloid or radial styloid region. Right elbow active range of motion 95 degrees -5 degrees. Patient has intact biceps and triceps resisted muscle strength at 5-/5 with guarding. Supination and pronation are painless and full.     Medial epicondylar pain noted today on direct palpation with no evidence of Tinel's sign positive in the ulnar groove and a negative Tinel's sign in the median nerve tract of the right hand/wrist.    X-ray: [facility] 3/29/2022 space 3 view of the right elbow reveals no sail or fat pad signs. No bony abnormalities fracture deformities lesions or other masses noted. No soft tissue ossifications. No lytic or blastic lesions. 3 view of the right wrist reveals no fracture deformity lesions or masses. No lytic or blastic lesions noted. No soft tissue ossifications noted. IMPRESSION:      ICD-10-CM ICD-9-CM    1. Pain  R52 780.96 AMB POC XRAY, WRIST; COMPLETE, 3+ VIE      AMB POC XRAY, ELBOW; COMPLETE, 3+ VIE   2. Right elbow pain  M25.521 719.42 AMB POC XRAY, WRIST; COMPLETE, 3+ VIE      AMB POC XRAY, ELBOW; COMPLETE, 3+ VIE   3. Right wrist pain  M25.531 719.43 AMB POC XRAY, WRIST; COMPLETE, 3+ VIE      AMB POC XRAY, ELBOW; COMPLETE, 3+ VIE   4. Fall, initial encounter  [address] XXXA E888.9    5. Laceration of right thumb without foreign body without damage to nail, initial encounter  S61.011A 883.0    6. Ecchymosis on examination  R58 459.89    7. Decreased range of motion of right wrist  M25.631 719.53    8. Decreased range of motion of right elbow  M25.621 719.52    9. Contusion right forearm wrist hand    PLAN: Today we discussed discussed alternatives care to include but not limited to a short course of conservative bracing surgery right wrist with a volar splint. Patient is to limit her push pull lift carry to no more than 1 pound right upper extremity. Regarding the laceration she is encouraged to keep it clean and dry. She should allow it to air out as much as possible and cover when she is in the bathroom or kitchen or in a unclean area. She should also cover when sleeping. She is followed with our office in about 2 to 3 weeks or sooner should symptoms fail to improve or worsen. Tramadol provided for pain control 1 p.o. twice daily for 7 days #14 dispensed. Care plan outlined and precautions discussed.   Results were reviewed with the patient. All medications were reviewed with the patient. All of pt's questions and concerns were addressed. Alarm symptoms and return precautions associated with chief complaint and evaluation were reviewed with the patient in detail. The patient demonstrated adequate understanding. The patient expresses willing compliance with the treatment plan. Patient provided a reminder for a \"due or due soon\" health maintenance. I have asked the patient to schedule an appointment with their primary care provider for follow-up on general health maintenance concerns. Today all the patient's questions were answered to their satisfaction. Copies of x-rays reviewed if obtained this visit, and provided to patient. Dictation disclaimer:  Please note that this dictation was completed with Cor[employer]o, the computer voice recognition software. Quite often unanticipated grammatical, syntax, homophones, and other interpretive errors are inadvertently transcribed by the computer software. Please disregard these errors. Please excuse any errors that have escaped final proofreading. Edgard PEÑALOZA, APC, MPAS, PA-C  [facility]

## 2022-04-02 ASSESSMENT — TONOMETRY
OS_IOP_MMHG: 15
OS_IOP_MMHG: 13
OD_IOP_MMHG: 15
OD_IOP_MMHG: 13
OD_IOP_MMHG: 15
OS_IOP_MMHG: 12

## 2022-04-02 ASSESSMENT — VISUAL ACUITY
OD_SC: 20/20
OS_SC: 20/20
OD_SC: 20/20
OD_SC: 20/20
OS_SC: 20/20
OS_SC: 20/30-2

## 2022-04-20 ENCOUNTER — OFFICE VISIT (OUTPATIENT)
Dept: ORTHOPEDIC SURGERY | Age: 47
End: 2022-04-20
Payer: COMMERCIAL

## 2022-04-20 VITALS
WEIGHT: 188.6 LBS | OXYGEN SATURATION: 93 % | BODY MASS INDEX: 27 KG/M2 | HEART RATE: 101 BPM | HEIGHT: 70 IN | TEMPERATURE: 97.5 F

## 2022-04-20 DIAGNOSIS — M25.521 RIGHT ELBOW PAIN: ICD-10-CM

## 2022-04-20 DIAGNOSIS — S46.311A STRAIN OF RIGHT TRICEPS, INITIAL ENCOUNTER: Primary | ICD-10-CM

## 2022-04-20 DIAGNOSIS — S53.401A SPRAIN OF RIGHT ELBOW, INITIAL ENCOUNTER: ICD-10-CM

## 2022-04-20 PROCEDURE — 99214 OFFICE O/P EST MOD 30 MIN: CPT | Performed by: SPECIALIST

## 2022-04-20 NOTE — PROGRESS NOTES
Patient: Estefania Daniel                MRN: [id number]       SSN: [id number]  YOB: 1975        AGE: 42 y.o. SEX: female    PCP: Unknown, Provider, MD  04/20/22    Chief Complaint   Patient presents with    Elbow Pain     follow up RT elbow pain      HISTORY:  Estefania Daniel is a 42 y.o. female who sustained a right elbow injury on 3/29/22. She caught herself with her outstretched hand when she tripped on her flip flop which caught on a door threshold. She was seen at the [facility] ED on the same day where right elbow and wrist x rays revealed no acute findings. She was then seen by RICK Mcgill on 3/29/22 where elbow and wrist x-rays revealed no acute findings. She has been experiencing elbow pain brandon the injury. She notes increased pain with stretching and all types of movement. 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 She occasionally travels to [place] [facility]. She lives with her  and 15 yo son in [place]. Her 31 yo son lives with her parents. Ms. Jasper Hyatt weighs 188 lbs and is 5'10\" tall.        No results found for: HBA1C, XBA7WXFD, HBR8AJAT, TFT1WCTO  Weight Metrics 4/20/2022 3/29/2022 3/23/2022 6/11/2021 5/21/2021 4/16/2021 4/9/2021   Weight 188 lb 9.6 oz 187 lb 180 lb 181 lb 182 lb 9.6 oz - 179 lb 3.2 oz   BMI 27.06 kg/m2 26.83 kg/m2 25.83 kg/m2 25.97 kg/m2 26.2 kg/m2 25.71 kg/m2 25.71 kg/m2       Patient Active Problem List   Diagnosis Code    Closed displaced fracture of fifth metatarsal bone of left foot S92.352A    Osteopenia of left foot M85.872     REVIEW OF SYSTEMS:    Constitutional Symptoms: Negative   Eyes: Negative   Ears, Nose, Throat and Mouth: Negative   Cardiovascular: Negative   Respiratory: Negative   Genitourinary: Per HPI   Gastrointestinal: Per HPI   Integumentary (Skin and/or Breast): Negative   Musculoskeletal: Per HPI   Endocrine/Rheumatologic: Negative   Neurological: Per HPI   Hematology/Lymphatic: Negative    Allergic/Immunologic: Negative   Phychiatric: Negative    Social History     Socioeconomic History    Marital status: SINGLE     Spouse name: Not on file    Number of children: Not on file    Years of education: Not on file    Highest education level: Not on file   Occupational History    Not on file   Tobacco Use    Smoking status: Former Smoker     Quit date: 2020     Years since quittin.7    Smokeless tobacco: Never Used    Tobacco comment: 3 cigs per day   Vaping Use    Vaping Use: Never used   Substance and Sexual Activity    Alcohol use: Yes     Alcohol/week: 2.0 standard drinks     Types: 2 Glasses of wine per week     Comment: weekends    Drug use: Never    Sexual activity: Not on file   Other Topics Concern    Not on file   Social History Narrative    Not on file     Social Determinants of Health     Financial Resource Strain:     Difficulty of Paying Living Expenses: Not on file   Food Insecurity:     Worried About Running Out of Food in the Last Year: Not on file    Brett of Food in the Last Year: Not on file   Transportation Needs:     Lack of Transportation (Medical): Not on file    Lack of Transportation (Non-Medical):  Not on file   Physical Activity:     Days of Exercise per Week: Not on file    Minutes of Exercise per Session: Not on file   Stress:     Feeling of Stress : Not on file   Social Connections:     Frequency of Communication with Friends and Family: Not on file    Frequency of Social Gatherings with Friends and Family: Not on file    Attends Jain Services: Not on file    Active Member of Clubs or Organizations: Not on file    Attends Club or Organization Meetings: Not on file    Marital Status: Not on file   Intimate Partner Violence:     Fear of Current or Ex-Partner: Not on file    Emotionally Abused: Not on file    Physically Abused: Not on file    Sexually Abused: Not on file   Housing Stability:     Unable to Pay for Housing in the Last Year: Not on file    Number of [place] in the Last Year: Not on file    Unstable Housing in the Last Year: Not on file      No Known Allergies   Current Outpatient Medications   Medication Sig    Emoll Comb. No.46-Sunscreen (Mederma) 30 SPF crea APPLY TO AFFECTED AREA THREE (3) TIMES PER DAY FOR 8 WEEKS    omeprazole (PRILOSEC) 20 mg capsule Take 20 Caps by mouth daily.  zolpidem CR (AMBIEN CR) 12.5 mg tablet Take 12.5 mg by mouth nightly as needed.  ondansetron (ZOFRAN ODT) 4 mg disintegrating tablet Take 1 Tab by mouth every eight (8) hours as needed for Nausea or Vomiting. Indications: prevent nausea and vomiting after surgery    aspirin (ASPIRIN) 325 mg tablet Take 1 Tab by mouth daily.  ergocalciferol (ERGOCALCIFEROL) 1,250 mcg (50,000 unit) capsule TAKE ONE CAPSULE BY MOUTH EVERY 7 DAYS FOR VITAMIN*D DEFICIENCY( HIGH DOSE THERAPY)    linaCLOtide (Linzess) 145 mcg cap capsule Take  by mouth daily.  valACYclovir (VALTREX) 500 mg tablet Take 500 mg by mouth daily.  buPROPion SR (WELLBUTRIN SR) 150 mg SR tablet Take  by mouth daily.  Lisdexamfetamine (VYVANSE) 70 mg cap Take  by mouth daily.  lamoTRIgine (LAMICTAL) 100 mg tablet Take 200 mg by mouth two (2) times a day.  lurasidone (LATUDA) 20 mg tab tablet Take 40 mg by mouth daily (with dinner).  LORazepam (ATIVAN) 2 mg tablet Take  by mouth every six (6) hours as needed for Anxiety.     polyethylene glycol (Miralax) 17 gram/dose powder Take 17 g by mouth daily. (Patient not taking: Reported on 3/29/2022)    promethazine (PHENERGAN) 25 mg tablet Take 1 Tab by mouth every six (6) hours as needed for Nausea. (Patient not taking: Reported on 3/29/2022)    polyethylene glycol (MIRALAX) 17 gram packet Take 1 Packet by mouth daily. (Patient not taking: Reported on 3/29/2022)     No current facility-administered medications for this visit. PHYSICAL EXAMINATION:  Visit Vitals  Pulse (!) 101   Temp 97.5 °F (36.4 °C) (Temporal)   Ht 5' 10\" (1.778 m)   Wt 188 lb 9.6 oz (85.5 kg)   SpO2 93%   BMI 27.06 kg/m²    Appearance: Alert, well appearing and pleasant patient who is in no distress, oriented to person, place/time, and who follows commands. HEENT: Annette Hands hears well, does not require hearing aids. Her sclera of the eyes are non-icteric. She is breathing normally and no respiratory accessory muscle use is noted. No JVD present and Neck ROM within normal limits. Psychiatric: Affect and mood are appropriate. Oriented x3  Cardiovascular/Peripheral Vascular: Normal pulses to each foot. Integumentary: No rashes. Warm and normal color. No drainage. Gait: normal  Sensory Exam: Intact/Normal Sensation    Lymphatic: No evidence of Lymphedema  Vascular:       Pulses: palpable  Varicosities none  Wounds/Abrasion: None Present  Neuro: Negative, no tremors  ORTHO EXAMINATION:  Examination Right Elbow Left Elbow   Skin Intact Intact   Range of Motion 135-0,  90-90 pronation/supiation 135-0   Tenderness + -   Swelling - -   Bruising - -   Stability Normal Normal   Motor Strength  Normal Normal   Neurovascular Intact Intact     RADIOGRAPHS:  XR RIGHT ELBOW 3/29/22 [facility]  -JORGE have independently reviewed these images during this office visit. -Dr. Leonie Alvarado:  Two views - No fractures, negative fat pad sign, no joint space narrowing.      XR RIGHT WRIST 3/29/22 [facility]  -I have independently reviewed these images during this office visit. -Dr. Candis Christina:  Three views - No fractures, no joint space narrowing. XR RIGHT ELBOW 3/23/22 [facility]  IMPRESSION   Right elbow:  -No definite fracture. Anterior fat pad borderline prominent which could reflect small joint effusion, and can be associated with occult radial head fracture. Clinical correlation advised.   Right forearm:  -No acute osseous findings. Distal forearm soft tissue swelling.   Right hand:  -No acute radiographic findings. IMPRESSION:      ICD-10-CM ICD-9-CM    1. Strain of right triceps, initial encounter  K35.987U 840.8    2. Sprain of right elbow, initial encounter  S53.401A 841.9    3. Right elbow pain  M25.521 719.42      PLAN: Based on today's exam fracture is doubtful. Elbow exercises provided There is no need for surgery or injection at this time. She will follow up as needed.       Scribed by MD Rogelio Leach) as dictated by Elsi Del Cid MD

## 2022-04-20 NOTE — PATIENT INSTRUCTIONS
Elbow Sprain: Care Instructions  Your Care Instructions     An elbow sprain occurs when you overstretch or tear the ligaments around your elbow. Ligaments are the tough tissues that connect one bone to another. A sprain can happen when you fall or when you play sports or do chores around the house. Most sprains will heal with some treatment at home. Follow-up care is a key part of your treatment and safety. Be sure to make and go to all appointments, and call your doctor if you are having problems. It's also a good idea to know your test results and keep a list of the medicines you take. How can you care for yourself at home? · Follow your doctor's directions for wearing a splint, elbow pad, sling, or elastic bandage. Wrapping the elbow may help reduce or prevent swelling. · Rest and protect your elbow. Do not do any activity that hurts your elbow. · Apply ice or a cold pack to your elbow for 10 to 20 minutes at a time to reduce swelling. Try this every 1 to 2 hours for 3 days (when you are awake) or until the swelling goes down. Put a thin cloth between the ice and your skin. · After 2 or 3 days, if your swelling is gone, apply a heating pad on low or a warm cloth to your elbow. This helps keep your arm flexible. Some doctors suggest that you go back and forth between hot and cold. Keep the splint dry. · Prop up your elbow on pillows while you apply ice or anytime you sit or lie down. Try to keep the elbow at or above the level of your heart to help reduce swelling. · Take pain medicines exactly as directed. ? If the doctor gave you a prescription medicine for pain, take it as prescribed. ? If you are not taking a prescription pain medicine, ask your doctor if you can take an over-the-counter medicine. · Return to your usual level of activity slowly. When should you call for help?    Call your doctor now or seek immediate medical care if:    · Your pain is worse.     · You have new or increased swelling in your elbow or hand.     · You cannot bend your arm.     · You have a fever.     · Your elbow looks red.     · You have tingling, weakness, or numbness in your elbow, hand, or fingers. Watch closely for changes in your health, and be sure to contact your doctor if:    · Your pain is not better after 2 weeks. Where can you learn more? Go to http://www.gray.com/  Enter T774 in the search box to learn more about \"Elbow Sprain: Care Instructions. \"  Current as of: July 1, 2021               Content Version: 13.2  © 3097-6237 [employer]. Care instructions adapted under license by [employer] (which disclaims liability or warranty for this information). If you have questions about a medical condition or this instruction, always ask your healthcare professional. [address] any warranty or liability for your use of this information. Elbow: Exercises  Introduction  Here are some examples of exercises for you to try. The exercises may be suggested for a condition or for rehabilitation. Start each exercise slowly. Ease off the exercises if you start to have pain. You will be told when to start these exercises and which ones will work best for you. How to do the exercises  Wrist flexor stretch    1. Extend your arm in front of you with your palm up. 2. Bend your wrist, pointing your hand toward the floor. 3. With your other hand, gently bend your wrist farther until you feel a mild to moderate stretch in your forearm. 4. Hold for at least 15 to 30 seconds. Repeat 2 to 4 times. Wrist extensor stretch    1. Repeat steps 1 to 4 of the stretch above but begin with your extended hand palm down. Ball or sock squeeze    1. Hold a tennis ball (or a rolled-up sock) in your hand. 2. Make a fist around the ball (or sock) and squeeze. 3. Hold for about 6 seconds, and then relax for up to 10 seconds.   4. Repeat 8 to 12 times.  5. Switch the ball (or sock) to your other hand and do 8 to 12 times. Wrist deviation    1. Sit so that your arm is supported but your hand hangs off the edge of a flat surface, such as a table. 2. Hold your hand out like you are shaking hands with someone. 3. Move your hand up and down. 4. Repeat this motion 8 to 12 times. 5. Switch arms. 6. Try to do this exercise twice with each hand. Wrist curls    1. Place your forearm on a table with your hand hanging over the edge of the table, palm up. 2. Place a 1- to 2-pound weight in your hand. This may be a dumbbell, a can of food, or a filled water bottle. 3. Slowly raise and lower the weight while keeping your forearm on the table and your palm facing up. 4. Repeat this motion 8 to 12 times. 5. Switch arms, and do steps 1 through 4.  6. Repeat with your hand facing down toward the floor. Switch arms. Biceps curls    1. Sit leaning forward with your legs slightly spread and your left hand on your left thigh. 2. Place your right elbow on your right thigh, and hold the weight with your forearm horizontal.  3. Slowly curl the weight up and toward your chest.  4. Repeat this motion 8 to 12 times. 5. Switch arms, and do steps 1 through 4. Follow-up care is a key part of your treatment and safety. Be sure to make and go to all appointments, and call your doctor if you are having problems. It's also a good idea to know your test results and keep a list of the medicines you take. Where can you learn more? Go to http://www.gray.com/  Enter M279 in the search box to learn more about \"Elbow: Exercises. \"  Current as of: July 1, 2021               Content Version: 13.2  © 2006-2022 [employer]. Care instructions adapted under license by [employer] (which disclaims liability or warranty for this information).  If you have questions about a medical condition or this instruction, always ask your healthcare professional. [address] any warranty or liability for your use of this information.

## 2022-11-03 ENCOUNTER — TRANSCRIBE ORDER (OUTPATIENT)
Dept: SCHEDULING | Age: 47
End: 2022-11-03

## 2022-11-03 DIAGNOSIS — R60.9 EDEMA: Primary | ICD-10-CM

## 2025-06-20 NOTE — PATIENT DISCUSSION
"Consult received for Vascular Access Team.  See LDA for details. For additional needs place \"Consult for Inpatient Vascular Access Care\"  [id number] order in EPIC.  " Start two days prior to surgery and continue as directed.

## (undated) DEVICE — STERILE LATEX POWDER-FREE SURGICAL GLOVESWITH NITRILE COATING: Brand: PROTEXIS

## (undated) DEVICE — SOLUTION IRRIG 1000ML H2O STRL BLT

## (undated) DEVICE — 3M™ BAIR PAWS FLEX™ WARMING GOWN, STANDARD, 20 PER CASE 81003: Brand: BAIR PAWS™

## (undated) DEVICE — C-ARMOR C-ARM EQUIPMENT COVERS CLEAR STERILE UNIVERSAL FIT 12 PER CASE: Brand: C-ARMOR

## (undated) DEVICE — WRAP COMPR W4INXL5YD NONSTERILE TAN SELF ADH COBAN

## (undated) DEVICE — PREP SKN CHLORHEX GLUC 8OZ --

## (undated) DEVICE — DERMACEA GAUZE ROLL: Brand: DERMACEA

## (undated) DEVICE — CATHETER INFUS L150CM OD1.07X0.79MM ID0.46X0.43MM DSTL L80CM

## (undated) DEVICE — 450 ML BOTTLE OF 0.05% CHLORHEXIDINE GLUCONATE IN 99.95% STERILE WATER FOR IRRIGATION, USP AND APPLICATOR.: Brand: IRRISEPT ANTIMICROBIAL WOUND LAVAGE

## (undated) DEVICE — BANDAGE COMPR EXSANGUATION SGL LAYERED NO CLSR 9FT LEN 4IN W

## (undated) DEVICE — BLADE ASSEMB CLP HAIR FINE --

## (undated) DEVICE — Z DISCONTINUED BY MEDLINE USE 2711682 TRAY SKIN PREP DRY W/ PREM GLV

## (undated) DEVICE — SPLINT CAST PLASTER 3X15FT -- CONVERT TO ITEM 369010

## (undated) DEVICE — DRESSING,GAUZE,XEROFORM,CURAD,1"X8",ST: Brand: CURAD

## (undated) DEVICE — BLADE OSC SAW 40X11MM -- STRKER

## (undated) DEVICE — DRAPE XR C ARM 41X74IN LF --

## (undated) DEVICE — GAUZE,SPONGE,4"X4",16PLY,STRL,LF,10/TRAY: Brand: MEDLINE

## (undated) DEVICE — GARMENT,MEDLINE,DVT,INT,CALF,LG, GEN2: Brand: MEDLINE

## (undated) DEVICE — X-RAY SPONGES,12 PLY: Brand: DERMACEA

## (undated) DEVICE — BLANKET WRM AD W50XL85.8IN PACU FULL BODY FORC AIR

## (undated) DEVICE — REM POLYHESIVE ADULT PATIENT RETURN ELECTRODE: Brand: VALLEYLAB

## (undated) DEVICE — NEEDLE SPNL 22GA L3.5IN BLK HUB S STL REG WALL FIT STYL W/

## (undated) DEVICE — SUTURE VCRL SZ 2-0 L27IN ABSRB UD L26MM SH 1/2 CIR J417H

## (undated) DEVICE — GUIDEWIRE NTHRD 0.9X150MM

## (undated) DEVICE — PADDING CAST SOF-ROL 6INX4YD --

## (undated) DEVICE — KIT CLN UP BON SECOURS MARYV

## (undated) DEVICE — GARMENT,MEDLINE,DVT,INT,CALF,MED, GEN2: Brand: MEDLINE

## (undated) DEVICE — BANDAGE COMPR W4INXL5YD BGE COHESIVE SELF ADH ADBAN CBN1104] AVCOR HEALTHCARE PRODUCTS INC]

## (undated) DEVICE — LIGHT HANDLE: Brand: DEVON

## (undated) DEVICE — FLEX ADVANTAGE 3000CC: Brand: FLEX ADVANTAGE

## (undated) DEVICE — ZIMMER® STERILE DISPOSABLE TOURNIQUET CUFF WITH PROTECTIVE SLEEVE AND PLC, DUAL PORT, SINGLE BLADDER, 34 IN. (86 CM)

## (undated) DEVICE — STERILE POLYISOPRENE POWDER-FREE SURGICAL GLOVES: Brand: PROTEXIS

## (undated) DEVICE — INTENDED FOR TISSUE SEPARATION, AND OTHER PROCEDURES THAT REQUIRE A SHARP SURGICAL BLADE TO PUNCTURE OR CUT.: Brand: BARD-PARKER SAFETY BLADES SIZE 10, STERILE

## (undated) DEVICE — P28, DRILL, Ø1.3 X 100MM (Ø2.0), SOLID, SS: Brand: BABY GORILLA®/GORILLA® PLATING SYSTEM

## (undated) DEVICE — THREE-QUARTER SHEET: Brand: CONVERTORS

## (undated) DEVICE — BANDAGE COBAN 4 IN COMPR W4INXL5YD FOAM COHESIVE QUIK STK SELF ADH SFT

## (undated) DEVICE — Device

## (undated) DEVICE — BLADE SAW W7XL295MM THK038MM CUT THK043MM REPL SAG OSC

## (undated) DEVICE — PACK PROCEDURE SURG MAJ W/ BASIN LF

## (undated) DEVICE — SUT ETHLN 3-0 18IN PC5 BLK --

## (undated) DEVICE — 3 ML SYRINGE WITH HYPODERMIC SAFETY NEEDLE: Brand: MAGELLAN

## (undated) DEVICE — SOLUTION IV 1000ML 0.9% SOD CHL

## (undated) DEVICE — SOFT SILICONE HYDROCELLULAR SACRUM DRESSING WITH LOCK AWAY LAYER: Brand: ALLEVYN LIFE SACRUM (LARGE) PACK OF 10

## (undated) DEVICE — (D)PREP SKN CHLRAPRP APPL 26ML -- CONVERT TO ITEM 371833

## (undated) DEVICE — INTENDED FOR TISSUE SEPARATION, AND OTHER PROCEDURES THAT REQUIRE A SHARP SURGICAL BLADE TO PUNCTURE OR CUT.: Brand: BARD-PARKER SAFETY BLADES SIZE 15, STERILE

## (undated) DEVICE — PACK SURG BSHR TOT KNEE LF

## (undated) DEVICE — SUTURE N ABSRB MONOFILAMENT 5-0 PC5 18 IN 19 MM BLK ETHILON 1895G

## (undated) DEVICE — SUTURE MCRYL SZ 3-0 L27IN ABSRB UD L26MM SH 1/2 CIR Y416H

## (undated) DEVICE — BANDAGE,GAUZE,BULKEE II,4.5"X4.1YD,STRL: Brand: MEDLINE

## (undated) DEVICE — PREP SKN CHLRAPRP APPL 10.5ML --

## (undated) DEVICE — PHYSIO-STIM BONE GROWTH STIMULATOR: Brand: PHYSIO-STIM

## (undated) DEVICE — P28, OVER DRILL, Ø2.1 X 90MM (Ø2.0), SOLID, SS: Brand: BABY GORILLA®/GORILLA® PLATING SYSTEM

## (undated) DEVICE — SUTURE VCRL SZ 3-0 L27IN ABSRB UD L26MM SH 1/2 CIR J416H

## (undated) DEVICE — DRAPE,HAND,STERILE: Brand: MEDLINE